# Patient Record
Sex: MALE | Race: WHITE | Employment: OTHER | URBAN - METROPOLITAN AREA
[De-identification: names, ages, dates, MRNs, and addresses within clinical notes are randomized per-mention and may not be internally consistent; named-entity substitution may affect disease eponyms.]

---

## 2017-04-17 PROBLEM — I21.9 ACUTE MI (HCC): Status: ACTIVE | Noted: 2017-04-17

## 2017-05-12 PROBLEM — I25.2 H/O ACUTE MYOCARDIAL INFARCTION: Status: ACTIVE | Noted: 2017-05-12

## 2017-05-12 PROBLEM — K92.2 GIB (GASTROINTESTINAL BLEEDING): Status: ACTIVE | Noted: 2017-05-12

## 2017-05-14 PROBLEM — D62 ACUTE BLOOD LOSS ANEMIA: Status: ACTIVE | Noted: 2017-05-14

## 2017-05-14 PROBLEM — K92.1 GASTROINTESTINAL HEMORRHAGE WITH MELENA: Status: ACTIVE | Noted: 2017-05-12

## 2017-06-07 PROBLEM — R55 NEAR SYNCOPE: Status: ACTIVE | Noted: 2017-06-07

## 2018-03-27 ENCOUNTER — HOSPITAL ENCOUNTER (OUTPATIENT)
Age: 83
Setting detail: THERAPIES SERIES
Discharge: HOME | End: 2018-03-27

## 2018-04-09 ENCOUNTER — HOSPITAL ENCOUNTER (OUTPATIENT)
Age: 83
Setting detail: THERAPIES SERIES
Discharge: HOME | End: 2018-04-09

## 2018-04-09 PROCEDURE — 9900000038 HC CARDIAC REHAB PHASE 3 - MONTHLY

## 2018-05-07 ENCOUNTER — HOSPITAL ENCOUNTER (OUTPATIENT)
Age: 83
Discharge: HOME | End: 2018-05-07
Payer: MEDICARE

## 2018-05-07 LAB
ALBUMIN SERPL-MCNC: 4.1 G/DL (ref 3.5–5.2)
ALP BLD-CCNC: 112 U/L (ref 40–129)
ALT SERPL-CCNC: 11 U/L (ref 0–40)
ANION GAP SERPL CALCULATED.3IONS-SCNC: 17 MMOL/L (ref 7–16)
AST SERPL-CCNC: 14 U/L (ref 0–39)
BASOPHILS ABSOLUTE: 0.05 E9/L (ref 0–0.2)
BASOPHILS RELATIVE PERCENT: 0.4 % (ref 0–2)
BILIRUB SERPL-MCNC: <0.2 MG/DL (ref 0–1.2)
BUN BLDV-MCNC: 46 MG/DL (ref 8–23)
CALCIUM SERPL-MCNC: 9.1 MG/DL (ref 8.6–10.2)
CHLORIDE BLD-SCNC: 105 MMOL/L (ref 98–107)
CHOLESTEROL, TOTAL: 120 MG/DL (ref 0–199)
CO2: 21 MMOL/L (ref 22–29)
CREAT SERPL-MCNC: 2.7 MG/DL (ref 0.7–1.2)
EOSINOPHILS ABSOLUTE: 0.22 E9/L (ref 0.05–0.5)
EOSINOPHILS RELATIVE PERCENT: 1.8 % (ref 0–6)
GFR AFRICAN AMERICAN: 27
GFR NON-AFRICAN AMERICAN: 22 ML/MIN/1.73
GLUCOSE BLD-MCNC: 122 MG/DL (ref 74–109)
HBA1C MFR BLD: 6.5 % (ref 4.8–5.9)
HCT VFR BLD CALC: 26.8 % (ref 37–54)
HDLC SERPL-MCNC: 40 MG/DL
HEMOGLOBIN: 7.8 G/DL (ref 12.5–16.5)
IMMATURE GRANULOCYTES #: 0.08 E9/L
IMMATURE GRANULOCYTES %: 0.7 % (ref 0–5)
LDL CHOLESTEROL CALCULATED: 45 MG/DL (ref 0–99)
LYMPHOCYTES ABSOLUTE: 2.41 E9/L (ref 1.5–4)
LYMPHOCYTES RELATIVE PERCENT: 20.3 % (ref 20–42)
MCH RBC QN AUTO: 21.8 PG (ref 26–35)
MCHC RBC AUTO-ENTMCNC: 29.1 % (ref 32–34.5)
MCV RBC AUTO: 75.1 FL (ref 80–99.9)
MONOCYTES ABSOLUTE: 0.94 E9/L (ref 0.1–0.95)
MONOCYTES RELATIVE PERCENT: 7.9 % (ref 2–12)
NEUTROPHILS ABSOLUTE: 8.2 E9/L (ref 1.8–7.3)
NEUTROPHILS RELATIVE PERCENT: 68.9 % (ref 43–80)
PDW BLD-RTO: 16.4 FL (ref 11.5–15)
PLATELET # BLD: 398 E9/L (ref 130–450)
PMV BLD AUTO: 9.6 FL (ref 7–12)
POTASSIUM SERPL-SCNC: 5.4 MMOL/L (ref 3.5–5)
RBC # BLD: 3.57 E12/L (ref 3.8–5.8)
SODIUM BLD-SCNC: 143 MMOL/L (ref 132–146)
T4 FREE: 0.84 NG/DL (ref 0.93–1.7)
TOTAL PROTEIN: 6.9 G/DL (ref 6.4–8.3)
TRIGL SERPL-MCNC: 177 MG/DL (ref 0–149)
TSH SERPL DL<=0.05 MIU/L-ACNC: 2.91 UIU/ML (ref 0.27–4.2)
VLDLC SERPL CALC-MCNC: 35 MG/DL
WBC # BLD: 11.9 E9/L (ref 4.5–11.5)

## 2018-05-07 PROCEDURE — 84439 ASSAY OF FREE THYROXINE: CPT

## 2018-05-07 PROCEDURE — 83036 HEMOGLOBIN GLYCOSYLATED A1C: CPT

## 2018-05-07 PROCEDURE — 85025 COMPLETE CBC W/AUTO DIFF WBC: CPT

## 2018-05-07 PROCEDURE — 36415 COLL VENOUS BLD VENIPUNCTURE: CPT

## 2018-05-07 PROCEDURE — 80061 LIPID PANEL: CPT

## 2018-05-07 PROCEDURE — 84443 ASSAY THYROID STIM HORMONE: CPT

## 2018-05-07 PROCEDURE — 80053 COMPREHEN METABOLIC PANEL: CPT

## 2018-05-14 ENCOUNTER — HOSPITAL ENCOUNTER (OUTPATIENT)
Age: 83
Setting detail: THERAPIES SERIES
Discharge: HOME | End: 2018-05-14

## 2018-05-14 PROCEDURE — 9900000038 HC CARDIAC REHAB PHASE 3 - MONTHLY

## 2018-07-12 ENCOUNTER — HOSPITAL ENCOUNTER (OUTPATIENT)
Age: 83
Discharge: HOME | End: 2018-07-12
Payer: MEDICARE

## 2018-07-12 LAB
ALBUMIN SERPL-MCNC: 4 G/DL (ref 3.5–5.2)
ALP BLD-CCNC: 118 U/L (ref 40–129)
ALT SERPL-CCNC: 11 U/L (ref 0–40)
ANION GAP SERPL CALCULATED.3IONS-SCNC: 11 MMOL/L (ref 7–16)
AST SERPL-CCNC: 15 U/L (ref 0–39)
BASOPHILS ABSOLUTE: 0.05 E9/L (ref 0–0.2)
BASOPHILS RELATIVE PERCENT: 0.4 % (ref 0–2)
BILIRUB SERPL-MCNC: <0.2 MG/DL (ref 0–1.2)
BUN BLDV-MCNC: 50 MG/DL (ref 8–23)
C3 COMPLEMENT: 127 MG/DL (ref 90–180)
C4 COMPLEMENT: 34 MG/DL (ref 10–40)
CALCIUM SERPL-MCNC: 9.6 MG/DL (ref 8.6–10.2)
CHLORIDE BLD-SCNC: 104 MMOL/L (ref 98–107)
CO2: 25 MMOL/L (ref 22–29)
CREAT SERPL-MCNC: 2.7 MG/DL (ref 0.7–1.2)
EOSINOPHILS ABSOLUTE: 0.25 E9/L (ref 0.05–0.5)
EOSINOPHILS RELATIVE PERCENT: 1.9 % (ref 0–6)
FERRITIN: 10 NG/ML
FOLATE: >20 NG/ML (ref 4.8–24.2)
GFR AFRICAN AMERICAN: 27
GFR NON-AFRICAN AMERICAN: 22 ML/MIN/1.73
GLUCOSE BLD-MCNC: 132 MG/DL (ref 74–109)
HAPTOGLOBIN: 305 MG/DL (ref 30–200)
HCT VFR BLD CALC: 27.4 % (ref 37–54)
HEMOGLOBIN: 8 G/DL (ref 12.5–16.5)
IMMATURE GRANULOCYTES #: 0.13 E9/L
IMMATURE GRANULOCYTES %: 1 % (ref 0–5)
IRON SATURATION: 6 % (ref 20–55)
IRON: 20 MCG/DL (ref 59–158)
LACTATE DEHYDROGENASE: 183 U/L (ref 135–225)
LYMPHOCYTES ABSOLUTE: 2.33 E9/L (ref 1.5–4)
LYMPHOCYTES RELATIVE PERCENT: 18 % (ref 20–42)
MCH RBC QN AUTO: 22.5 PG (ref 26–35)
MCHC RBC AUTO-ENTMCNC: 29.2 % (ref 32–34.5)
MCV RBC AUTO: 77.2 FL (ref 80–99.9)
MONOCYTES ABSOLUTE: 1.1 E9/L (ref 0.1–0.95)
MONOCYTES RELATIVE PERCENT: 8.5 % (ref 2–12)
NEUTROPHILS ABSOLUTE: 9.05 E9/L (ref 1.8–7.3)
NEUTROPHILS RELATIVE PERCENT: 70.2 % (ref 43–80)
PDW BLD-RTO: 19.9 FL (ref 11.5–15)
PLATELET # BLD: 421 E9/L (ref 130–450)
PMV BLD AUTO: 9.5 FL (ref 7–12)
POTASSIUM SERPL-SCNC: 5.5 MMOL/L (ref 3.5–5)
RBC # BLD: 3.55 E12/L (ref 3.8–5.8)
RHEUMATOID FACTOR: <10 IU/ML (ref 0–13)
SODIUM BLD-SCNC: 140 MMOL/L (ref 132–146)
TOTAL IRON BINDING CAPACITY: 336 MCG/DL (ref 250–450)
TOTAL PROTEIN: 7.2 G/DL (ref 6.4–8.3)
VITAMIN B-12: 937 PG/ML (ref 211–946)
VITAMIN D 25-HYDROXY: 79 NG/ML (ref 30–100)
WBC # BLD: 12.9 E9/L (ref 4.5–11.5)

## 2018-07-12 PROCEDURE — 82607 VITAMIN B-12: CPT

## 2018-07-12 PROCEDURE — 86431 RHEUMATOID FACTOR QUANT: CPT

## 2018-07-12 PROCEDURE — 84165 PROTEIN E-PHORESIS SERUM: CPT

## 2018-07-12 PROCEDURE — 83010 ASSAY OF HAPTOGLOBIN QUANT: CPT

## 2018-07-12 PROCEDURE — 83540 ASSAY OF IRON: CPT

## 2018-07-12 PROCEDURE — 80053 COMPREHEN METABOLIC PANEL: CPT

## 2018-07-12 PROCEDURE — 36415 COLL VENOUS BLD VENIPUNCTURE: CPT

## 2018-07-12 PROCEDURE — 82728 ASSAY OF FERRITIN: CPT

## 2018-07-12 PROCEDURE — 85025 COMPLETE CBC W/AUTO DIFF WBC: CPT

## 2018-07-12 PROCEDURE — 86038 ANTINUCLEAR ANTIBODIES: CPT

## 2018-07-12 PROCEDURE — 82306 VITAMIN D 25 HYDROXY: CPT

## 2018-07-12 PROCEDURE — 83615 LACTATE (LD) (LDH) ENZYME: CPT

## 2018-07-12 PROCEDURE — 83550 IRON BINDING TEST: CPT

## 2018-07-12 PROCEDURE — 86160 COMPLEMENT ANTIGEN: CPT

## 2018-07-12 PROCEDURE — 82746 ASSAY OF FOLIC ACID SERUM: CPT

## 2018-07-13 ENCOUNTER — HOSPITAL ENCOUNTER (EMERGENCY)
Age: 83
Discharge: HOME | End: 2018-07-13
Payer: MEDICARE

## 2018-07-13 ENCOUNTER — APPOINTMENT (OUTPATIENT)
Dept: GENERAL RADIOLOGY | Age: 83
End: 2018-07-13
Payer: MEDICARE

## 2018-07-13 VITALS
DIASTOLIC BLOOD PRESSURE: 55 MMHG | TEMPERATURE: 98.4 F | RESPIRATION RATE: 16 BRPM | SYSTOLIC BLOOD PRESSURE: 101 MMHG | BODY MASS INDEX: 28.89 KG/M2 | HEART RATE: 74 BPM | OXYGEN SATURATION: 95 % | WEIGHT: 190 LBS

## 2018-07-13 DIAGNOSIS — K62.89 RECTAL PAIN: Primary | ICD-10-CM

## 2018-07-13 LAB
ALBUMIN SERPL-MCNC: 3.1 G/DL (ref 3.5–4.7)
ALPHA-1-GLOBULIN: 0.2 G/DL (ref 0.2–0.4)
ALPHA-2-GLOBULIN: 1 G/FL (ref 0.5–1)
ANTI-NUCLEAR ANTIBODY (ANA): NEGATIVE
BETA GLOBULIN: 1.2 G/DL (ref 0.8–1.3)
ELECTROPHORESIS: ABNORMAL
GAMMA GLOBULIN: 1.1 G/DL (ref 0.7–1.6)

## 2018-07-13 PROCEDURE — 74019 RADEX ABDOMEN 2 VIEWS: CPT

## 2018-07-13 PROCEDURE — 99212 OFFICE O/P EST SF 10 MIN: CPT

## 2018-07-13 RX ORDER — MENTHOL AND ZINC OXIDE .44; 20.625 G/100G; G/100G
OINTMENT TOPICAL 2 TIMES DAILY PRN
Qty: 1 TUBE | Refills: 4 | Status: SHIPPED | OUTPATIENT
Start: 2018-07-13 | End: 2018-07-20

## 2018-07-13 ASSESSMENT — PAIN DESCRIPTION - LOCATION: LOCATION: ABDOMEN

## 2018-07-13 ASSESSMENT — PAIN DESCRIPTION - DESCRIPTORS: DESCRIPTORS: DISCOMFORT

## 2018-07-13 ASSESSMENT — PAIN SCALES - GENERAL: PAINLEVEL_OUTOF10: 3

## 2018-07-16 ENCOUNTER — HOSPITAL ENCOUNTER (OUTPATIENT)
Age: 83
Setting detail: SPECIMEN
Discharge: HOME | End: 2018-07-16
Payer: MEDICARE

## 2018-07-16 LAB
BILIRUBIN URINE: NEGATIVE
BLOOD, URINE: NEGATIVE
CLARITY: CLEAR
COLOR: YELLOW
GLUCOSE URINE: NEGATIVE MG/DL
KETONES, URINE: NEGATIVE MG/DL
LEUKOCYTE ESTERASE, URINE: NEGATIVE
NITRITE, URINE: NEGATIVE
PH UA: 6 (ref 5–9)
PROTEIN UA: NEGATIVE MG/DL
SPECIFIC GRAVITY UA: 1.02 (ref 1–1.03)
UROBILINOGEN, URINE: 0.2 E.U./DL

## 2018-07-16 PROCEDURE — 82570 ASSAY OF URINE CREATININE: CPT

## 2018-07-16 PROCEDURE — 82044 UR ALBUMIN SEMIQUANTITATIVE: CPT

## 2018-07-16 PROCEDURE — 81003 URINALYSIS AUTO W/O SCOPE: CPT

## 2018-07-16 PROCEDURE — 84166 PROTEIN E-PHORESIS/URINE/CSF: CPT

## 2018-07-20 LAB — ADDENDUM ELECTROPHORESIS URINE RANDOM: NORMAL

## 2018-07-22 LAB
CREATININE URINE: 104 MG/DL (ref 40–278)
MICROALBUMIN UR-MCNC: <12 MG/L
MICROALBUMIN/CREAT UR-RTO: ABNORMAL (ref 0–30)

## 2018-08-10 ENCOUNTER — HOSPITAL ENCOUNTER (EMERGENCY)
Age: 83
Discharge: HOME | End: 2018-08-10
Payer: MEDICARE

## 2018-08-10 VITALS
RESPIRATION RATE: 20 BRPM | SYSTOLIC BLOOD PRESSURE: 153 MMHG | DIASTOLIC BLOOD PRESSURE: 84 MMHG | BODY MASS INDEX: 29.65 KG/M2 | HEART RATE: 75 BPM | WEIGHT: 195 LBS | OXYGEN SATURATION: 94 % | TEMPERATURE: 98.2 F

## 2018-08-10 DIAGNOSIS — K08.89 TOOTHACHE: Primary | ICD-10-CM

## 2018-08-10 PROCEDURE — 99212 OFFICE O/P EST SF 10 MIN: CPT

## 2018-08-10 RX ORDER — ACETAMINOPHEN AND CODEINE PHOSPHATE 300; 30 MG/1; MG/1
1 TABLET ORAL EVERY 6 HOURS PRN
Qty: 12 TABLET | Refills: 0 | Status: SHIPPED | OUTPATIENT
Start: 2018-08-10 | End: 2018-08-22

## 2018-08-10 RX ORDER — AMOXICILLIN 500 MG/1
500 CAPSULE ORAL 3 TIMES DAILY
Qty: 30 CAPSULE | Refills: 0 | Status: SHIPPED | OUTPATIENT
Start: 2018-08-10 | End: 2018-08-20

## 2018-08-10 ASSESSMENT — PAIN DESCRIPTION - PROGRESSION: CLINICAL_PROGRESSION: GRADUALLY WORSENING

## 2018-08-10 ASSESSMENT — PAIN DESCRIPTION - DESCRIPTORS: DESCRIPTORS: ACHING;DISCOMFORT;THROBBING

## 2018-08-10 ASSESSMENT — PAIN SCALES - GENERAL: PAINLEVEL_OUTOF10: 8

## 2018-08-10 ASSESSMENT — PAIN DESCRIPTION - FREQUENCY: FREQUENCY: CONTINUOUS

## 2018-08-10 ASSESSMENT — PAIN DESCRIPTION - LOCATION: LOCATION: TEETH

## 2018-08-10 ASSESSMENT — PAIN DESCRIPTION - ORIENTATION: ORIENTATION: LEFT;LOWER

## 2018-08-10 ASSESSMENT — PAIN DESCRIPTION - PAIN TYPE: TYPE: ACUTE PAIN

## 2018-08-10 NOTE — ED PROVIDER NOTES
This is a 80year old male that presents to urgent care complaining of left lower dental pain for the past several days. Denies difficulty breathing or swallowing. No chest pain. No fever or chills. Review of Systems    Physical Exam   Constitutional: He is oriented to person, place, and time. He appears well-developed and well-nourished. HENT:   Head: Normocephalic and atraumatic. Right Ear: Hearing, tympanic membrane, external ear and ear canal normal.   Left Ear: Hearing, tympanic membrane, external ear and ear canal normal.   Nose: Nose normal. Right sinus exhibits no maxillary sinus tenderness and no frontal sinus tenderness. Left sinus exhibits no maxillary sinus tenderness and no frontal sinus tenderness. Mouth/Throat: Uvula is midline, oropharynx is clear and moist and mucous membranes are normal. No trismus in the jaw. Dental caries present. No dental abscesses or uvula swelling. Eyes: Conjunctivae, EOM and lids are normal. Pupils are equal, round, and reactive to light. Neck: Normal range of motion. Neck supple. Cardiovascular: Normal rate, regular rhythm and normal heart sounds. No murmur heard. Pulmonary/Chest: Effort normal and breath sounds normal.   Abdominal: Soft. Bowel sounds are normal. There is no tenderness. There is no rigidity, no rebound, no guarding and no CVA tenderness. Musculoskeletal: He exhibits no edema. Neurological: He is alert and oriented to person, place, and time. He has normal strength. No cranial nerve deficit or sensory deficit. Coordination and gait normal. GCS eye subscore is 4. GCS verbal subscore is 5. GCS motor subscore is 6. Skin: Skin is warm and dry. No abrasion and no rash noted. Nursing note and vitals reviewed.       Procedures    MDM         --------------------------------------------- PAST HISTORY ---------------------------------------------  Past Medical History:  has a past medical history of Acute blood loss anemia; Colitis; Diabetes ([facility] [address] GERD (gastroesophageal reflux disease); Hypertension; Overactive bladder; and Thyroid disease. Past Surgical History:  has a past surgical history that includes joint replacement (Bilateral); Colonoscopy; Appendectomy; skin biopsy; back surgery; Circumcision, non- (2016); Cholecystectomy; Liver surgery; Cardiac catheterization (2017); and Coronary angioplasty with stent (Right, 2017). Social History:  reports that he quit smoking about 68 years ago. He has never used smokeless tobacco. He reports that he does not drink alcohol or use drugs. Family History: family history is not on file. The patients home medications have been reviewed. Allergies: Patient has no known allergies. -------------------------------------------------- RESULTS -------------------------------------------------  No results found for this visit on 08/10/18. No orders to display       ------------------------- NURSING NOTES AND VITALS REVIEWED ---------------------------   The nursing notes within the ED encounter and vital signs as below have been reviewed. BP (!) 153/84   Pulse 75   Temp 98.2 °F (36.8 °C) (Oral)   Resp 20   Wt 195 lb (88.5 kg)   SpO2 94%   BMI 29.65 kg/m²   Oxygen Saturation Interpretation: Normal      ------------------------------------------ PROGRESS NOTES ------------------------------------------   I have spoken with the patient and discussed todays results, in addition to providing specific details for the plan of care and counseling regarding the diagnosis and prognosis. Their questions are answered at this time and they are agreeable with the plan.      --------------------------------- ADDITIONAL PROVIDER NOTES ---------------------------------     This patient is stable for discharge. I have shared the specific conditions for return, as well as the importance of follow-up.       * NOTE: This report was transcribed using voice

## 2018-10-08 ENCOUNTER — HOSPITAL ENCOUNTER (OUTPATIENT)
Age: 83
Discharge: HOME | End: 2018-10-08
Payer: MEDICARE

## 2018-10-08 LAB
INR BLD: 1
PROTHROMBIN TIME: 11.4 SEC (ref 9.3–12.4)

## 2018-10-08 PROCEDURE — 85610 PROTHROMBIN TIME: CPT

## 2018-10-08 PROCEDURE — 36415 COLL VENOUS BLD VENIPUNCTURE: CPT

## 2018-12-17 ENCOUNTER — OFFICE VISIT (OUTPATIENT)
Dept: FAMILY MEDICINE CLINIC | Age: 83
End: 2018-12-17
Payer: MEDICARE

## 2018-12-17 VITALS
SYSTOLIC BLOOD PRESSURE: 126 MMHG | OXYGEN SATURATION: 90 % | HEIGHT: 68 IN | HEART RATE: 88 BPM | WEIGHT: 196 LBS | BODY MASS INDEX: 29.7 KG/M2 | DIASTOLIC BLOOD PRESSURE: 64 MMHG | TEMPERATURE: 98.3 F | RESPIRATION RATE: 20 BRPM

## 2018-12-17 DIAGNOSIS — Z91.81 AT HIGH RISK FOR FALLS: ICD-10-CM

## 2018-12-17 DIAGNOSIS — E03.9 ACQUIRED HYPOTHYROIDISM: ICD-10-CM

## 2018-12-17 DIAGNOSIS — Z23 NEED FOR INFLUENZA VACCINATION: ICD-10-CM

## 2018-12-17 DIAGNOSIS — Z76.89 ENCOUNTER TO ESTABLISH CARE: Primary | ICD-10-CM

## 2018-12-17 DIAGNOSIS — Z13.31 POSITIVE DEPRESSION SCREENING: ICD-10-CM

## 2018-12-17 DIAGNOSIS — E11.40 TYPE 2 DIABETES MELLITUS WITH DIABETIC NEUROPATHY, WITHOUT LONG-TERM CURRENT USE OF INSULIN (HCC): ICD-10-CM

## 2018-12-17 DIAGNOSIS — I10 ESSENTIAL HYPERTENSION: ICD-10-CM

## 2018-12-17 PROCEDURE — 99214 OFFICE O/P EST MOD 30 MIN: CPT | Performed by: FAMILY MEDICINE

## 2018-12-17 PROCEDURE — G8431 POS CLIN DEPRES SCRN F/U DOC: HCPCS | Performed by: FAMILY MEDICINE

## 2018-12-17 PROCEDURE — G0444 DEPRESSION SCREEN ANNUAL: HCPCS | Performed by: FAMILY MEDICINE

## 2018-12-17 PROCEDURE — G8419 CALC BMI OUT NRM PARAM NOF/U: HCPCS | Performed by: FAMILY MEDICINE

## 2018-12-17 PROCEDURE — 1123F ACP DISCUSS/DSCN MKR DOCD: CPT | Performed by: FAMILY MEDICINE

## 2018-12-17 PROCEDURE — 90662 IIV NO PRSV INCREASED AG IM: CPT | Performed by: FAMILY MEDICINE

## 2018-12-17 PROCEDURE — 1036F TOBACCO NON-USER: CPT | Performed by: FAMILY MEDICINE

## 2018-12-17 PROCEDURE — G8427 DOCREV CUR MEDS BY ELIG CLIN: HCPCS | Performed by: FAMILY MEDICINE

## 2018-12-17 PROCEDURE — 1101F PT FALLS ASSESS-DOCD LE1/YR: CPT | Performed by: FAMILY MEDICINE

## 2018-12-17 PROCEDURE — 4040F PNEUMOC VAC/ADMIN/RCVD: CPT | Performed by: FAMILY MEDICINE

## 2018-12-17 PROCEDURE — G8482 FLU IMMUNIZE ORDER/ADMIN: HCPCS | Performed by: FAMILY MEDICINE

## 2018-12-17 PROCEDURE — G0008 ADMIN INFLUENZA VIRUS VAC: HCPCS | Performed by: FAMILY MEDICINE

## 2018-12-17 RX ORDER — LEVOTHYROXINE SODIUM 175 UG/1
175 TABLET ORAL
Qty: 90 TABLET | Refills: 1 | Status: SHIPPED | OUTPATIENT
Start: 2018-12-17 | End: 2019-06-21 | Stop reason: SDUPTHER

## 2018-12-17 RX ORDER — METOPROLOL SUCCINATE 100 MG/1
100 TABLET, EXTENDED RELEASE ORAL DAILY
COMMUNITY
End: 2018-12-17 | Stop reason: SDUPTHER

## 2018-12-17 RX ORDER — CLOPIDOGREL BISULFATE 75 MG/1
75 TABLET ORAL
Qty: 90 TABLET | Refills: 1 | Status: SHIPPED | OUTPATIENT
Start: 2018-12-17 | End: 2019-06-21 | Stop reason: SDUPTHER

## 2018-12-17 RX ORDER — GABAPENTIN 100 MG/1
100 CAPSULE ORAL 3 TIMES DAILY
Qty: 90 CAPSULE | Refills: 0 | Status: SHIPPED | OUTPATIENT
Start: 2018-12-17 | End: 2019-02-26 | Stop reason: SDUPTHER

## 2018-12-17 RX ORDER — METOPROLOL SUCCINATE 100 MG/1
100 TABLET, EXTENDED RELEASE ORAL DAILY
Qty: 90 TABLET | Refills: 1 | Status: SHIPPED | OUTPATIENT
Start: 2018-12-17 | End: 2019-07-03 | Stop reason: SDUPTHER

## 2018-12-17 RX ORDER — NITROGLYCERIN 0.4 MG/1
TABLET SUBLINGUAL
COMMUNITY
End: 2019-12-11

## 2018-12-17 RX ORDER — PANTOPRAZOLE SODIUM 40 MG/1
40 TABLET, DELAYED RELEASE ORAL
Qty: 90 TABLET | Refills: 1 | Status: SHIPPED | OUTPATIENT
Start: 2018-12-17 | End: 2019-12-11

## 2018-12-17 RX ORDER — EZETIMIBE AND SIMVASTATIN 10; 40 MG/1; MG/1
1 TABLET ORAL NIGHTLY
Qty: 90 TABLET | Refills: 1 | Status: SHIPPED | OUTPATIENT
Start: 2018-12-17 | End: 2019-12-11

## 2018-12-17 RX ORDER — EZETIMIBE AND SIMVASTATIN 10; 40 MG/1; MG/1
1 TABLET ORAL
COMMUNITY
End: 2018-12-17 | Stop reason: SDUPTHER

## 2018-12-17 RX ORDER — METFORMIN HYDROCHLORIDE 500 MG/1
500 TABLET, EXTENDED RELEASE ORAL
Qty: 90 TABLET | Refills: 1 | Status: SHIPPED | OUTPATIENT
Start: 2018-12-17 | End: 2018-12-30

## 2018-12-17 RX ORDER — METOPROLOL TARTRATE AND HYDROCHLOROTHIAZIDE 50; 25 MG/1; MG/1
TABLET ORAL
COMMUNITY
End: 2018-12-17 | Stop reason: SDUPTHER

## 2018-12-17 ASSESSMENT — PATIENT HEALTH QUESTIONNAIRE - PHQ9
SUM OF ALL RESPONSES TO PHQ QUESTIONS 1-9: 10
5. POOR APPETITE OR OVEREATING: 0
6. FEELING BAD ABOUT YOURSELF - OR THAT YOU ARE A FAILURE OR HAVE LET YOURSELF OR YOUR FAMILY DOWN: 0
10. IF YOU CHECKED OFF ANY PROBLEMS, HOW DIFFICULT HAVE THESE PROBLEMS MADE IT FOR YOU TO DO YOUR WORK, TAKE CARE OF THINGS AT HOME, OR GET ALONG WITH OTHER PEOPLE: 1
SUM OF ALL RESPONSES TO PHQ9 QUESTIONS 1 & 2: 5
3. TROUBLE FALLING OR STAYING ASLEEP: 2
SUM OF ALL RESPONSES TO PHQ QUESTIONS 1-9: 10
2. FEELING DOWN, DEPRESSED OR HOPELESS: 2
4. FEELING TIRED OR HAVING LITTLE ENERGY: 3
8. MOVING OR SPEAKING SO SLOWLY THAT OTHER PEOPLE COULD HAVE NOTICED. OR THE OPPOSITE, BEING SO FIGETY OR RESTLESS THAT YOU HAVE BEEN MOVING AROUND A LOT MORE THAN USUAL: 0
1. LITTLE INTEREST OR PLEASURE IN DOING THINGS: 3
7. TROUBLE CONCENTRATING ON THINGS, SUCH AS READING THE NEWSPAPER OR WATCHING TELEVISION: 0
9. THOUGHTS THAT YOU WOULD BE BETTER OFF DEAD, OR OF HURTING YOURSELF: 0

## 2018-12-17 NOTE — PROGRESS NOTES
OFFICE PROGRESS NOTE      SUBJECTIVE:        Patient ID:   Consuelo Fagan is a 80 y.o. male who presents for   Chief Complaint   Patient presents with    Establish Care    Hypertension    Diabetes     neuropathy     Depression     depression screening complete - score 10     Other     fall screen positive    Elbow Injury     fall hit elbow           HPI:     FEELS GOOD. WATCHING DIET GOOD. WALKING SOME  FOR EXERCISE. HAS PROBLEM BALANCING AT TIMES. HAS PROBLEM WITH FEELING AND NUMBNESS IN THE FEET. H/O DIABETES FOR ABOUT 50 YEARS. TAKING MEDICATIONS REGULARLY. HAD SURGERY DONE BY DR. Mercy Abide ON THE LEFT SHOULDER. STILL HAS SOME PAIN BUT GETTING BETTER. [facility]ING [facility]SFUSION FOR LOW BLOOD COUNT. BLOOD COUNT IS GRADUALLY IMPROVING. Prior to Admission medications    Medication Sig Start Date End Date Taking? Authorizing Provider   nitroGLYCERIN (NITROSTAT) 0.4 MG SL tablet nitroglycerin 0.4 mg sublingual tablet   Place 1 tablet by sublingual route. Yes Historical Provider, MD   metoprolol succinate (TOPROL XL) 100 MG extended release tablet Take 1 tablet by mouth daily 12/17/18  Yes Mundo Estrada MD   ezetimibe-simvastatin (VYTORIN) 10-40 MG per tablet Take 1 tablet by mouth nightly 12/17/18  Yes Mundo Estrada MD   metFORMIN (GLUCOPHAGE-XR) 500 MG extended release tablet Take 1 tablet by mouth every morning (before breakfast) 12/17/18  Yes Mundo Estrada MD   levothyroxine (SYNTHROID) 175 MCG tablet Take 1 tablet by mouth every morning (before breakfast) 12/17/18  Yes Mundo Estrada MD   clopidogrel (PLAVIX) 75 MG tablet Take 1 tablet by mouth every morning (before breakfast) 12/17/18  Yes Mundo Estrada MD   pantoprazole (PROTONIX) 40 MG tablet Take 1 tablet by mouth every morning (before breakfast) 12/17/18  Yes Mundo Estrada MD   gabapentin (NEURONTIN) 100 MG capsule Take 1 capsule by mouth 3 times daily for 30 days.  Intended supply: 30 days. 12/17/18 1/16/19 Yes Richardson Valladares MD   acetaminophen (TYLENOL) 325 MG tablet Take 650 mg by mouth every morning (before breakfast)   Yes Historical Provider, MD   atorvastatin (LIPITOR) 40 MG tablet Take 1 tablet by mouth nightly 4/29/17  Yes Yong Orlando MD     Social History     Social History    Marital status:      Spouse name: N/A    Number of children: N/A    Years of education: N/A     Social History Main Topics    Smoking status: Former Smoker     Quit date: 1950    Smokeless tobacco: Never Used    Alcohol use No    Drug use: No    Sexual activity: No     Other Topics Concern    None     Social History Narrative    None       I have reviewed Cj's allergies, medications, problem list, medical, social and family history and have updated as needed in the electronic medical record  Review Of Systems:    Skin: no abnormal pigmentation, rash, scaling, itching, masses, hair or nail changes  Eyes: no blurring, diplopia, or eye pain  Ears/Nose/Throat: no hearing loss, tinnitus, vertigo, nosebleed, nasal congestion, rhinorrhea, sore throat  Respiratory: no cough, pleuritic chest pain, dyspnea, or wheezing  Cardiovascular: no chest pain, angina, dyspnea on exertion, orthopnea, PND, palpitations, or claudication  Gastrointestinal: no nausea, vomiting, heartburn, diarrhea, constipation, bloating,  abdominal pain, or blood per rectum. Appetite is good  Genitourinary: no urinary urgency, frequency, dysuria, nocturia, hesitancy, or incontinence  Musculoskeletal: joint pains off and on. Morning stiffness.  Ambulating well  Neurologic: no paralysis, paresis, paresthesia, seizures, tremors, or headaches  Hematologic/Lymphatic/Immunologic: no anemia, abnormal bleeding/bruising, fever, chills, night sweats, swollen glands, or unexplained weight loss  Endocrine: no heat or cold intolerance and no polyphagia, polydipsia, or polyuria        OBJECTIVE:     VS:  Wt Readings from Last 3

## 2018-12-30 ENCOUNTER — HOSPITAL ENCOUNTER (EMERGENCY)
Age: 83
Discharge: HOME | End: 2018-12-30
Payer: MEDICARE

## 2018-12-30 ENCOUNTER — APPOINTMENT (OUTPATIENT)
Dept: GENERAL RADIOLOGY | Age: 83
End: 2018-12-30
Payer: MEDICARE

## 2018-12-30 VITALS
RESPIRATION RATE: 20 BRPM | OXYGEN SATURATION: 91 % | SYSTOLIC BLOOD PRESSURE: 145 MMHG | BODY MASS INDEX: 30.31 KG/M2 | WEIGHT: 200 LBS | HEIGHT: 68 IN | HEART RATE: 74 BPM | TEMPERATURE: 97.8 F | DIASTOLIC BLOOD PRESSURE: 65 MMHG

## 2018-12-30 DIAGNOSIS — S20.211A CONTUSION OF RIB ON RIGHT SIDE, INITIAL ENCOUNTER: Primary | ICD-10-CM

## 2018-12-30 PROCEDURE — 99284 EMERGENCY DEPT VISIT MOD MDM: CPT

## 2018-12-30 PROCEDURE — 6370000000 HC RX 637 (ALT 250 FOR IP): Performed by: PHYSICIAN ASSISTANT

## 2018-12-30 PROCEDURE — 71101 X-RAY EXAM UNILAT RIBS/CHEST: CPT

## 2018-12-30 RX ORDER — HYDROCODONE BITARTRATE AND ACETAMINOPHEN 5; 325 MG/1; MG/1
1 TABLET ORAL ONCE
Status: COMPLETED | OUTPATIENT
Start: 2018-12-30 | End: 2018-12-30

## 2018-12-30 RX ORDER — HYDROCODONE BITARTRATE AND ACETAMINOPHEN 5; 325 MG/1; MG/1
1 TABLET ORAL EVERY 6 HOURS PRN
Qty: 8 TABLET | Refills: 0 | Status: SHIPPED | OUTPATIENT
Start: 2018-12-30 | End: 2019-01-01

## 2018-12-30 RX ADMIN — HYDROCODONE BITARTRATE AND ACETAMINOPHEN 1 TABLET: 5; 325 TABLET ORAL at 11:34

## 2018-12-30 ASSESSMENT — PAIN SCALES - GENERAL: PAINLEVEL_OUTOF10: 8

## 2019-02-26 ENCOUNTER — OFFICE VISIT (OUTPATIENT)
Dept: FAMILY MEDICINE CLINIC | Age: 84
End: 2019-02-26
Payer: MEDICARE

## 2019-02-26 VITALS
HEIGHT: 67 IN | TEMPERATURE: 97.8 F | OXYGEN SATURATION: 92 % | BODY MASS INDEX: 28.6 KG/M2 | HEART RATE: 82 BPM | WEIGHT: 182.19 LBS | SYSTOLIC BLOOD PRESSURE: 128 MMHG | RESPIRATION RATE: 18 BRPM | DIASTOLIC BLOOD PRESSURE: 62 MMHG

## 2019-02-26 DIAGNOSIS — I10 ESSENTIAL HYPERTENSION: ICD-10-CM

## 2019-02-26 DIAGNOSIS — E46 PROTEIN MALNUTRITION (HCC): ICD-10-CM

## 2019-02-26 DIAGNOSIS — E08.00 DIABETES MELLITUS DUE TO UNDERLYING CONDITION WITH HYPEROSMOLARITY WITHOUT COMA, WITHOUT LONG-TERM CURRENT USE OF INSULIN (HCC): ICD-10-CM

## 2019-02-26 DIAGNOSIS — N18.4 CKD (CHRONIC KIDNEY DISEASE) STAGE 4, GFR 15-29 ML/MIN (HCC): ICD-10-CM

## 2019-02-26 DIAGNOSIS — Z23 NEED FOR VACCINATION WITH 13-POLYVALENT PNEUMOCOCCAL CONJUGATE VACCINE: ICD-10-CM

## 2019-02-26 DIAGNOSIS — N18.30 CHRONIC KIDNEY DISEASE, STAGE III (MODERATE) (HCC): ICD-10-CM

## 2019-02-26 DIAGNOSIS — Z13.31 DEPRESSION SCREENING: ICD-10-CM

## 2019-02-26 DIAGNOSIS — R06.02 SOB (SHORTNESS OF BREATH): ICD-10-CM

## 2019-02-26 DIAGNOSIS — E11.00 TYPE 2 DIABETES MELLITUS WITH HYPEROSMOLARITY WITHOUT COMA, WITHOUT LONG-TERM CURRENT USE OF INSULIN (HCC): Primary | ICD-10-CM

## 2019-02-26 LAB — HBA1C MFR BLD: 5.9 %

## 2019-02-26 PROCEDURE — 4040F PNEUMOC VAC/ADMIN/RCVD: CPT | Performed by: FAMILY MEDICINE

## 2019-02-26 PROCEDURE — 1101F PT FALLS ASSESS-DOCD LE1/YR: CPT | Performed by: FAMILY MEDICINE

## 2019-02-26 PROCEDURE — 90670 PCV13 VACCINE IM: CPT | Performed by: FAMILY MEDICINE

## 2019-02-26 PROCEDURE — 83036 HEMOGLOBIN GLYCOSYLATED A1C: CPT | Performed by: FAMILY MEDICINE

## 2019-02-26 PROCEDURE — 1123F ACP DISCUSS/DSCN MKR DOCD: CPT | Performed by: FAMILY MEDICINE

## 2019-02-26 PROCEDURE — G0009 ADMIN PNEUMOCOCCAL VACCINE: HCPCS | Performed by: FAMILY MEDICINE

## 2019-02-26 PROCEDURE — G8419 CALC BMI OUT NRM PARAM NOF/U: HCPCS | Performed by: FAMILY MEDICINE

## 2019-02-26 PROCEDURE — 99214 OFFICE O/P EST MOD 30 MIN: CPT | Performed by: FAMILY MEDICINE

## 2019-02-26 PROCEDURE — G8482 FLU IMMUNIZE ORDER/ADMIN: HCPCS | Performed by: FAMILY MEDICINE

## 2019-02-26 PROCEDURE — G8427 DOCREV CUR MEDS BY ELIG CLIN: HCPCS | Performed by: FAMILY MEDICINE

## 2019-02-26 PROCEDURE — 94010 BREATHING CAPACITY TEST: CPT | Performed by: FAMILY MEDICINE

## 2019-02-26 PROCEDURE — 1036F TOBACCO NON-USER: CPT | Performed by: FAMILY MEDICINE

## 2019-02-26 RX ORDER — GABAPENTIN 100 MG/1
200 CAPSULE ORAL 2 TIMES DAILY
Qty: 120 CAPSULE | Refills: 2 | Status: SHIPPED | OUTPATIENT
Start: 2019-02-26 | End: 2020-01-07 | Stop reason: SDUPTHER

## 2019-02-26 RX ORDER — METFORMIN HYDROCHLORIDE 500 MG/1
500 TABLET, EXTENDED RELEASE ORAL
Qty: 90 TABLET | Refills: 1 | Status: SHIPPED | OUTPATIENT
Start: 2019-02-26 | End: 2019-12-31

## 2019-02-26 RX ORDER — ACETAMINOPHEN 160 MG
TABLET,DISINTEGRATING ORAL
COMMUNITY
End: 2019-12-11

## 2019-02-26 ASSESSMENT — PATIENT HEALTH QUESTIONNAIRE - PHQ9
2. FEELING DOWN, DEPRESSED OR HOPELESS: 0
1. LITTLE INTEREST OR PLEASURE IN DOING THINGS: 0
SUM OF ALL RESPONSES TO PHQ QUESTIONS 1-9: 0
SUM OF ALL RESPONSES TO PHQ9 QUESTIONS 1 & 2: 0
SUM OF ALL RESPONSES TO PHQ QUESTIONS 1-9: 0

## 2019-06-24 RX ORDER — LEVOTHYROXINE SODIUM 175 UG/1
175 TABLET ORAL DAILY
Qty: 30 TABLET | Refills: 0 | Status: SHIPPED | OUTPATIENT
Start: 2019-06-24 | End: 2019-07-03 | Stop reason: SDUPTHER

## 2019-06-24 RX ORDER — CLOPIDOGREL BISULFATE 75 MG/1
75 TABLET ORAL DAILY
Qty: 30 TABLET | Refills: 0 | Status: SHIPPED | OUTPATIENT
Start: 2019-06-24 | End: 2019-07-03 | Stop reason: SDUPTHER

## 2019-06-24 NOTE — TELEPHONE ENCOUNTER
Received call requesting refill   Last OV 2/6/19  Next OV  Not Scheduled    Patient does need a follow up.  30 days pended, if approved ill set up follow up with patient.

## 2019-07-03 RX ORDER — ATORVASTATIN CALCIUM 40 MG/1
40 TABLET, FILM COATED ORAL NIGHTLY
Qty: 90 TABLET | Refills: 3 | Status: SHIPPED
Start: 2019-07-03 | End: 2020-06-18 | Stop reason: SDUPTHER

## 2019-07-03 RX ORDER — LEVOTHYROXINE SODIUM 175 UG/1
175 TABLET ORAL DAILY
Qty: 90 TABLET | Refills: 3 | Status: SHIPPED
Start: 2019-07-03 | End: 2020-06-18 | Stop reason: SDUPTHER

## 2019-07-03 RX ORDER — METOPROLOL SUCCINATE 100 MG/1
100 TABLET, EXTENDED RELEASE ORAL DAILY
Qty: 90 TABLET | Refills: 3 | Status: SHIPPED
Start: 2019-07-03 | End: 2020-06-18 | Stop reason: SDUPTHER

## 2019-07-03 RX ORDER — CLOPIDOGREL BISULFATE 75 MG/1
75 TABLET ORAL DAILY
Qty: 90 TABLET | Refills: 3 | Status: SHIPPED
Start: 2019-07-03 | End: 2020-07-23 | Stop reason: SDUPTHER

## 2019-12-11 ENCOUNTER — APPOINTMENT (OUTPATIENT)
Dept: GENERAL RADIOLOGY | Age: 84
End: 2019-12-11
Payer: MEDICARE

## 2019-12-11 ENCOUNTER — HOSPITAL ENCOUNTER (OUTPATIENT)
Age: 84
Setting detail: OBSERVATION
Discharge: HOME | End: 2019-12-14
Attending: EMERGENCY MEDICINE | Admitting: INTERNAL MEDICINE
Payer: MEDICARE

## 2019-12-11 DIAGNOSIS — I48.91 ATRIAL FIBRILLATION WITH RVR (HCC): ICD-10-CM

## 2019-12-11 DIAGNOSIS — R07.9 CHEST PAIN, UNSPECIFIED TYPE: Primary | ICD-10-CM

## 2019-12-11 PROBLEM — R79.89 ELEVATED BRAIN NATRIURETIC PEPTIDE (BNP) LEVEL: Status: ACTIVE | Noted: 2019-12-11

## 2019-12-11 LAB
ANION GAP SERPL CALCULATED.3IONS-SCNC: 16 MMOL/L (ref 7–16)
BASOPHILS ABSOLUTE: 0.05 E9/L (ref 0–0.2)
BASOPHILS RELATIVE PERCENT: 0.4 % (ref 0–2)
BUN BLDV-MCNC: 32 MG/DL (ref 8–23)
CALCIUM SERPL-MCNC: 9.1 MG/DL (ref 8.6–10.2)
CHLORIDE BLD-SCNC: 103 MMOL/L (ref 98–107)
CO2: 23 MMOL/L (ref 22–29)
CREAT SERPL-MCNC: 2 MG/DL (ref 0.7–1.2)
EOSINOPHILS ABSOLUTE: 0.46 E9/L (ref 0.05–0.5)
EOSINOPHILS RELATIVE PERCENT: 3.8 % (ref 0–6)
GFR AFRICAN AMERICAN: 38
GFR NON-AFRICAN AMERICAN: 31 ML/MIN/1.73
GLUCOSE BLD-MCNC: 144 MG/DL (ref 74–99)
HCT VFR BLD CALC: 44.2 % (ref 37–54)
HEMOGLOBIN: 13.9 G/DL (ref 12.5–16.5)
IMMATURE GRANULOCYTES #: 0.05 E9/L
IMMATURE GRANULOCYTES %: 0.4 % (ref 0–5)
LYMPHOCYTES ABSOLUTE: 2.04 E9/L (ref 1.5–4)
LYMPHOCYTES RELATIVE PERCENT: 16.7 % (ref 20–42)
MAGNESIUM: 1.9 MG/DL (ref 1.6–2.6)
MCH RBC QN AUTO: 29 PG (ref 26–35)
MCHC RBC AUTO-ENTMCNC: 31.4 % (ref 32–34.5)
MCV RBC AUTO: 92.1 FL (ref 80–99.9)
MONOCYTES ABSOLUTE: 1.07 E9/L (ref 0.1–0.95)
MONOCYTES RELATIVE PERCENT: 8.7 % (ref 2–12)
NEUTROPHILS ABSOLUTE: 8.58 E9/L (ref 1.8–7.3)
NEUTROPHILS RELATIVE PERCENT: 70 % (ref 43–80)
PDW BLD-RTO: 13.2 FL (ref 11.5–15)
PLATELET # BLD: 303 E9/L (ref 130–450)
PMV BLD AUTO: 9.7 FL (ref 7–12)
POTASSIUM SERPL-SCNC: 5.8 MMOL/L (ref 3.5–5)
PRO-BNP: 2848 PG/ML (ref 0–450)
RBC # BLD: 4.8 E12/L (ref 3.8–5.8)
SODIUM BLD-SCNC: 142 MMOL/L (ref 132–146)
TROPONIN: <0.01 NG/ML (ref 0–0.03)
TSH SERPL DL<=0.05 MIU/L-ACNC: 0.01 UIU/ML (ref 0.27–4.2)
WBC # BLD: 12.3 E9/L (ref 4.5–11.5)

## 2019-12-11 PROCEDURE — 93005 ELECTROCARDIOGRAM TRACING: CPT | Performed by: NURSE PRACTITIONER

## 2019-12-11 PROCEDURE — 6370000000 HC RX 637 (ALT 250 FOR IP): Performed by: EMERGENCY MEDICINE

## 2019-12-11 PROCEDURE — 84484 ASSAY OF TROPONIN QUANT: CPT

## 2019-12-11 PROCEDURE — 2580000003 HC RX 258

## 2019-12-11 PROCEDURE — 85025 COMPLETE CBC W/AUTO DIFF WBC: CPT

## 2019-12-11 PROCEDURE — 93005 ELECTROCARDIOGRAM TRACING: CPT | Performed by: EMERGENCY MEDICINE

## 2019-12-11 PROCEDURE — 83735 ASSAY OF MAGNESIUM: CPT

## 2019-12-11 PROCEDURE — 36415 COLL VENOUS BLD VENIPUNCTURE: CPT

## 2019-12-11 PROCEDURE — 84443 ASSAY THYROID STIM HORMONE: CPT

## 2019-12-11 PROCEDURE — 80048 BASIC METABOLIC PNL TOTAL CA: CPT

## 2019-12-11 PROCEDURE — 71045 X-RAY EXAM CHEST 1 VIEW: CPT

## 2019-12-11 PROCEDURE — 83880 ASSAY OF NATRIURETIC PEPTIDE: CPT

## 2019-12-11 PROCEDURE — 99285 EMERGENCY DEPT VISIT HI MDM: CPT

## 2019-12-11 PROCEDURE — 99220 PR INITIAL OBSERVATION CARE/DAY 70 MINUTES: CPT | Performed by: NURSE PRACTITIONER

## 2019-12-11 RX ORDER — SODIUM CHLORIDE 9 MG/ML
INJECTION, SOLUTION INTRAVENOUS
Status: COMPLETED
Start: 2019-12-11 | End: 2019-12-11

## 2019-12-11 RX ORDER — ASPIRIN 81 MG/1
324 TABLET, CHEWABLE ORAL ONCE
Status: COMPLETED | OUTPATIENT
Start: 2019-12-11 | End: 2019-12-11

## 2019-12-11 RX ADMIN — ASPIRIN 81 MG 324 MG: 81 TABLET ORAL at 22:31

## 2019-12-11 RX ADMIN — SODIUM CHLORIDE 500 ML: 9 INJECTION, SOLUTION INTRAVENOUS at 22:32

## 2019-12-11 ASSESSMENT — PAIN SCALES - GENERAL: PAINLEVEL_OUTOF10: 2

## 2019-12-11 ASSESSMENT — ENCOUNTER SYMPTOMS
DIARRHEA: 0
NAUSEA: 0
VOMITING: 0
CHEST TIGHTNESS: 0
SHORTNESS OF BREATH: 1
ABDOMINAL PAIN: 0

## 2019-12-11 ASSESSMENT — PAIN DESCRIPTION - DESCRIPTORS: DESCRIPTORS: ACHING

## 2019-12-11 ASSESSMENT — PAIN DESCRIPTION - ORIENTATION: ORIENTATION: LEFT

## 2019-12-11 ASSESSMENT — PAIN DESCRIPTION - FREQUENCY: FREQUENCY: CONTINUOUS

## 2019-12-11 ASSESSMENT — PAIN DESCRIPTION - PAIN TYPE: TYPE: ACUTE PAIN

## 2019-12-11 ASSESSMENT — PAIN DESCRIPTION - LOCATION: LOCATION: CHEST

## 2019-12-12 ENCOUNTER — APPOINTMENT (OUTPATIENT)
Dept: ULTRASOUND IMAGING | Age: 84
End: 2019-12-12
Payer: MEDICARE

## 2019-12-12 PROBLEM — R07.9 CHEST PAIN: Status: ACTIVE | Noted: 2019-12-12

## 2019-12-12 LAB
ANION GAP SERPL CALCULATED.3IONS-SCNC: 13 MMOL/L (ref 7–16)
BUN BLDV-MCNC: 32 MG/DL (ref 8–23)
CALCIUM SERPL-MCNC: 8.6 MG/DL (ref 8.6–10.2)
CHLORIDE BLD-SCNC: 107 MMOL/L (ref 98–107)
CO2: 23 MMOL/L (ref 22–29)
CREAT SERPL-MCNC: 1.9 MG/DL (ref 0.7–1.2)
CREATININE URINE: 68 MG/DL (ref 40–278)
EKG ATRIAL RATE: 127 BPM
EKG Q-T INTERVAL: 314 MS
EKG QRS DURATION: 86 MS
EKG QTC CALCULATION (BAZETT): 509 MS
EKG R AXIS: -31 DEGREES
EKG T AXIS: -76 DEGREES
EKG VENTRICULAR RATE: 158 BPM
GFR AFRICAN AMERICAN: 40
GFR NON-AFRICAN AMERICAN: 33 ML/MIN/1.73
GLUCOSE BLD-MCNC: 95 MG/DL (ref 74–99)
LV EF: 55 %
LVEF MODALITY: NORMAL
METER GLUCOSE: 112 MG/DL (ref 74–99)
METER GLUCOSE: 112 MG/DL (ref 74–99)
METER GLUCOSE: 124 MG/DL (ref 74–99)
METER GLUCOSE: 126 MG/DL (ref 74–99)
METER GLUCOSE: 98 MG/DL (ref 74–99)
OSMOLALITY URINE: 421 MOSM/KG (ref 300–900)
POTASSIUM REFLEX MAGNESIUM: 4.6 MMOL/L (ref 3.5–5)
SODIUM BLD-SCNC: 143 MMOL/L (ref 132–146)
SODIUM URINE: 94 MMOL/L
TROPONIN: 0.62 NG/ML (ref 0–0.03)
TROPONIN: 0.64 NG/ML (ref 0–0.03)

## 2019-12-12 PROCEDURE — 6370000000 HC RX 637 (ALT 250 FOR IP): Performed by: NURSE PRACTITIONER

## 2019-12-12 PROCEDURE — 97165 OT EVAL LOW COMPLEX 30 MIN: CPT

## 2019-12-12 PROCEDURE — 99226 PR SBSQ OBSERVATION CARE/DAY 35 MINUTES: CPT | Performed by: INTERNAL MEDICINE

## 2019-12-12 PROCEDURE — 84484 ASSAY OF TROPONIN QUANT: CPT

## 2019-12-12 PROCEDURE — 97161 PT EVAL LOW COMPLEX 20 MIN: CPT | Performed by: PHYSICAL THERAPIST

## 2019-12-12 PROCEDURE — 83935 ASSAY OF URINE OSMOLALITY: CPT

## 2019-12-12 PROCEDURE — 97530 THERAPEUTIC ACTIVITIES: CPT

## 2019-12-12 PROCEDURE — 80048 BASIC METABOLIC PNL TOTAL CA: CPT

## 2019-12-12 PROCEDURE — 2580000003 HC RX 258: Performed by: NURSE PRACTITIONER

## 2019-12-12 PROCEDURE — 6370000000 HC RX 637 (ALT 250 FOR IP): Performed by: INTERNAL MEDICINE

## 2019-12-12 PROCEDURE — 93306 TTE W/DOPPLER COMPLETE: CPT

## 2019-12-12 PROCEDURE — 84300 ASSAY OF URINE SODIUM: CPT

## 2019-12-12 PROCEDURE — 82570 ASSAY OF URINE CREATININE: CPT

## 2019-12-12 PROCEDURE — G0378 HOSPITAL OBSERVATION PER HR: HCPCS

## 2019-12-12 PROCEDURE — 93010 ELECTROCARDIOGRAM REPORT: CPT | Performed by: INTERNAL MEDICINE

## 2019-12-12 PROCEDURE — 82962 GLUCOSE BLOOD TEST: CPT

## 2019-12-12 PROCEDURE — 97116 GAIT TRAINING THERAPY: CPT | Performed by: PHYSICAL THERAPIST

## 2019-12-12 PROCEDURE — 36415 COLL VENOUS BLD VENIPUNCTURE: CPT

## 2019-12-12 PROCEDURE — 76770 US EXAM ABDO BACK WALL COMP: CPT

## 2019-12-12 RX ORDER — DEXTROSE MONOHYDRATE 50 MG/ML
100 INJECTION, SOLUTION INTRAVENOUS PRN
Status: DISCONTINUED | OUTPATIENT
Start: 2019-12-12 | End: 2019-12-14 | Stop reason: HOSPADM

## 2019-12-12 RX ORDER — GABAPENTIN 100 MG/1
200 CAPSULE ORAL 2 TIMES DAILY
Status: DISCONTINUED | OUTPATIENT
Start: 2019-12-12 | End: 2019-12-14 | Stop reason: HOSPADM

## 2019-12-12 RX ORDER — ACETAMINOPHEN 325 MG/1
650 TABLET ORAL
Status: DISCONTINUED | OUTPATIENT
Start: 2019-12-12 | End: 2019-12-14 | Stop reason: HOSPADM

## 2019-12-12 RX ORDER — SODIUM CHLORIDE 0.9 % (FLUSH) 0.9 %
10 SYRINGE (ML) INJECTION EVERY 12 HOURS SCHEDULED
Status: DISCONTINUED | OUTPATIENT
Start: 2019-12-12 | End: 2019-12-14 | Stop reason: HOSPADM

## 2019-12-12 RX ORDER — SODIUM CHLORIDE 9 MG/ML
INJECTION, SOLUTION INTRAVENOUS CONTINUOUS
Status: DISCONTINUED | OUTPATIENT
Start: 2019-12-12 | End: 2019-12-12

## 2019-12-12 RX ORDER — CLOPIDOGREL BISULFATE 75 MG/1
75 TABLET ORAL DAILY
Status: DISCONTINUED | OUTPATIENT
Start: 2019-12-12 | End: 2019-12-14 | Stop reason: HOSPADM

## 2019-12-12 RX ORDER — SODIUM CHLORIDE 0.9 % (FLUSH) 0.9 %
10 SYRINGE (ML) INJECTION PRN
Status: DISCONTINUED | OUTPATIENT
Start: 2019-12-12 | End: 2019-12-14 | Stop reason: HOSPADM

## 2019-12-12 RX ORDER — ONDANSETRON 2 MG/ML
4 INJECTION INTRAMUSCULAR; INTRAVENOUS EVERY 6 HOURS PRN
Status: DISCONTINUED | OUTPATIENT
Start: 2019-12-12 | End: 2019-12-14 | Stop reason: HOSPADM

## 2019-12-12 RX ORDER — METOPROLOL SUCCINATE 100 MG/1
100 TABLET, EXTENDED RELEASE ORAL DAILY
Status: DISCONTINUED | OUTPATIENT
Start: 2019-12-12 | End: 2019-12-14 | Stop reason: HOSPADM

## 2019-12-12 RX ORDER — LEVOTHYROXINE SODIUM 175 UG/1
175 TABLET ORAL DAILY
Status: DISCONTINUED | OUTPATIENT
Start: 2019-12-12 | End: 2019-12-14 | Stop reason: HOSPADM

## 2019-12-12 RX ORDER — DEXTROSE MONOHYDRATE 25 G/50ML
12.5 INJECTION, SOLUTION INTRAVENOUS PRN
Status: DISCONTINUED | OUTPATIENT
Start: 2019-12-12 | End: 2019-12-14 | Stop reason: HOSPADM

## 2019-12-12 RX ORDER — ATORVASTATIN CALCIUM 40 MG/1
40 TABLET, FILM COATED ORAL NIGHTLY
Status: DISCONTINUED | OUTPATIENT
Start: 2019-12-12 | End: 2019-12-14 | Stop reason: HOSPADM

## 2019-12-12 RX ORDER — NICOTINE POLACRILEX 4 MG
15 LOZENGE BUCCAL PRN
Status: DISCONTINUED | OUTPATIENT
Start: 2019-12-12 | End: 2019-12-14 | Stop reason: HOSPADM

## 2019-12-12 RX ADMIN — CLOPIDOGREL BISULFATE 75 MG: 75 TABLET ORAL at 13:14

## 2019-12-12 RX ADMIN — GABAPENTIN 200 MG: 100 CAPSULE ORAL at 13:14

## 2019-12-12 RX ADMIN — METOPROLOL SUCCINATE 100 MG: 100 TABLET, EXTENDED RELEASE ORAL at 13:14

## 2019-12-12 RX ADMIN — Medication 10 ML: at 21:14

## 2019-12-12 RX ADMIN — ATORVASTATIN CALCIUM 40 MG: 40 TABLET, FILM COATED ORAL at 21:14

## 2019-12-12 RX ADMIN — SODIUM CHLORIDE: 9 INJECTION, SOLUTION INTRAVENOUS at 18:57

## 2019-12-12 RX ADMIN — SODIUM CHLORIDE: 9 INJECTION, SOLUTION INTRAVENOUS at 01:16

## 2019-12-12 RX ADMIN — Medication 10 ML: at 13:18

## 2019-12-12 RX ADMIN — NITROGLYCERIN 1 INCH: 20 OINTMENT TOPICAL at 21:21

## 2019-12-12 ASSESSMENT — PAIN SCALES - GENERAL
PAINLEVEL_OUTOF10: 0

## 2019-12-13 LAB
ANION GAP SERPL CALCULATED.3IONS-SCNC: 10 MMOL/L (ref 7–16)
ANION GAP SERPL CALCULATED.3IONS-SCNC: 12 MMOL/L (ref 7–16)
BASOPHILS ABSOLUTE: 0.05 E9/L (ref 0–0.2)
BASOPHILS RELATIVE PERCENT: 0.6 % (ref 0–2)
BUN BLDV-MCNC: 29 MG/DL (ref 8–23)
BUN BLDV-MCNC: 30 MG/DL (ref 8–23)
CALCIUM SERPL-MCNC: 9.1 MG/DL (ref 8.6–10.2)
CALCIUM SERPL-MCNC: 9.2 MG/DL (ref 8.6–10.2)
CHLORIDE BLD-SCNC: 105 MMOL/L (ref 98–107)
CHLORIDE BLD-SCNC: 107 MMOL/L (ref 98–107)
CO2: 23 MMOL/L (ref 22–29)
CO2: 25 MMOL/L (ref 22–29)
CREAT SERPL-MCNC: 1.8 MG/DL (ref 0.7–1.2)
CREAT SERPL-MCNC: 1.9 MG/DL (ref 0.7–1.2)
EKG ATRIAL RATE: 87 BPM
EKG Q-T INTERVAL: 284 MS
EKG QRS DURATION: 70 MS
EKG QTC CALCULATION (BAZETT): 476 MS
EKG R AXIS: -31 DEGREES
EKG T AXIS: -67 DEGREES
EKG VENTRICULAR RATE: 169 BPM
EOSINOPHILS ABSOLUTE: 0.4 E9/L (ref 0.05–0.5)
EOSINOPHILS RELATIVE PERCENT: 4.5 % (ref 0–6)
GFR AFRICAN AMERICAN: 40
GFR AFRICAN AMERICAN: 43
GFR NON-AFRICAN AMERICAN: 33 ML/MIN/1.73
GFR NON-AFRICAN AMERICAN: 35 ML/MIN/1.73
GLUCOSE BLD-MCNC: 108 MG/DL (ref 74–99)
GLUCOSE BLD-MCNC: 121 MG/DL (ref 74–99)
HCT VFR BLD CALC: 37.7 % (ref 37–54)
HEMOGLOBIN: 12 G/DL (ref 12.5–16.5)
IMMATURE GRANULOCYTES #: 0.04 E9/L
IMMATURE GRANULOCYTES %: 0.4 % (ref 0–5)
LYMPHOCYTES ABSOLUTE: 1.98 E9/L (ref 1.5–4)
LYMPHOCYTES RELATIVE PERCENT: 22.1 % (ref 20–42)
MCH RBC QN AUTO: 29.2 PG (ref 26–35)
MCHC RBC AUTO-ENTMCNC: 31.8 % (ref 32–34.5)
MCV RBC AUTO: 91.7 FL (ref 80–99.9)
METER GLUCOSE: 102 MG/DL (ref 74–99)
METER GLUCOSE: 112 MG/DL (ref 74–99)
METER GLUCOSE: 118 MG/DL (ref 74–99)
METER GLUCOSE: 145 MG/DL (ref 74–99)
MONOCYTES ABSOLUTE: 1 E9/L (ref 0.1–0.95)
MONOCYTES RELATIVE PERCENT: 11.2 % (ref 2–12)
NEUTROPHILS ABSOLUTE: 5.49 E9/L (ref 1.8–7.3)
NEUTROPHILS RELATIVE PERCENT: 61.2 % (ref 43–80)
PDW BLD-RTO: 13.2 FL (ref 11.5–15)
PLATELET # BLD: 267 E9/L (ref 130–450)
PMV BLD AUTO: 9.7 FL (ref 7–12)
POTASSIUM SERPL-SCNC: 4.7 MMOL/L (ref 3.5–5)
POTASSIUM SERPL-SCNC: 5.2 MMOL/L (ref 3.5–5)
RBC # BLD: 4.11 E12/L (ref 3.8–5.8)
SODIUM BLD-SCNC: 140 MMOL/L (ref 132–146)
SODIUM BLD-SCNC: 142 MMOL/L (ref 132–146)
T3 TOTAL: 79.31 NG/DL (ref 80–200)
T4 FREE: 1.5 NG/DL (ref 0.93–1.7)
WBC # BLD: 9 E9/L (ref 4.5–11.5)

## 2019-12-13 PROCEDURE — 6370000000 HC RX 637 (ALT 250 FOR IP): Performed by: NURSE PRACTITIONER

## 2019-12-13 PROCEDURE — 97116 GAIT TRAINING THERAPY: CPT

## 2019-12-13 PROCEDURE — 2580000003 HC RX 258: Performed by: NURSE PRACTITIONER

## 2019-12-13 PROCEDURE — 82962 GLUCOSE BLOOD TEST: CPT

## 2019-12-13 PROCEDURE — 84480 ASSAY TRIIODOTHYRONINE (T3): CPT

## 2019-12-13 PROCEDURE — 99226 PR SBSQ OBSERVATION CARE/DAY 35 MINUTES: CPT | Performed by: INTERNAL MEDICINE

## 2019-12-13 PROCEDURE — G0378 HOSPITAL OBSERVATION PER HR: HCPCS

## 2019-12-13 PROCEDURE — 80048 BASIC METABOLIC PNL TOTAL CA: CPT

## 2019-12-13 PROCEDURE — 84439 ASSAY OF FREE THYROXINE: CPT

## 2019-12-13 PROCEDURE — 36415 COLL VENOUS BLD VENIPUNCTURE: CPT

## 2019-12-13 PROCEDURE — 6370000000 HC RX 637 (ALT 250 FOR IP): Performed by: INTERNAL MEDICINE

## 2019-12-13 PROCEDURE — 85025 COMPLETE CBC W/AUTO DIFF WBC: CPT

## 2019-12-13 PROCEDURE — 93010 ELECTROCARDIOGRAM REPORT: CPT | Performed by: INTERNAL MEDICINE

## 2019-12-13 RX ORDER — AMLODIPINE BESYLATE 5 MG/1
5 TABLET ORAL DAILY
Status: DISCONTINUED | OUTPATIENT
Start: 2019-12-13 | End: 2019-12-13

## 2019-12-13 RX ORDER — DILTIAZEM HYDROCHLORIDE 120 MG/1
120 CAPSULE, COATED, EXTENDED RELEASE ORAL DAILY
Status: DISCONTINUED | OUTPATIENT
Start: 2019-12-13 | End: 2019-12-14 | Stop reason: HOSPADM

## 2019-12-13 RX ORDER — ACETAMINOPHEN 325 MG/1
650 TABLET ORAL EVERY 4 HOURS PRN
Status: DISCONTINUED | OUTPATIENT
Start: 2019-12-13 | End: 2019-12-14 | Stop reason: HOSPADM

## 2019-12-13 RX ORDER — ISOSORBIDE MONONITRATE 60 MG/1
60 TABLET, EXTENDED RELEASE ORAL DAILY
Status: DISCONTINUED | OUTPATIENT
Start: 2019-12-13 | End: 2019-12-14 | Stop reason: HOSPADM

## 2019-12-13 RX ADMIN — AMLODIPINE BESYLATE 5 MG: 5 TABLET ORAL at 11:00

## 2019-12-13 RX ADMIN — NITROGLYCERIN 1 INCH: 20 OINTMENT TOPICAL at 06:33

## 2019-12-13 RX ADMIN — DILTIAZEM HYDROCHLORIDE 120 MG: 120 CAPSULE, COATED, EXTENDED RELEASE ORAL at 15:37

## 2019-12-13 RX ADMIN — METOPROLOL SUCCINATE 100 MG: 100 TABLET, EXTENDED RELEASE ORAL at 09:50

## 2019-12-13 RX ADMIN — LEVOTHYROXINE SODIUM 175 MCG: 175 TABLET ORAL at 06:33

## 2019-12-13 RX ADMIN — Medication 10 ML: at 11:00

## 2019-12-13 RX ADMIN — CLOPIDOGREL BISULFATE 75 MG: 75 TABLET ORAL at 09:50

## 2019-12-13 RX ADMIN — APIXABAN 5 MG: 5 TABLET, FILM COATED ORAL at 21:04

## 2019-12-13 RX ADMIN — ISOSORBIDE MONONITRATE 60 MG: 60 TABLET, EXTENDED RELEASE ORAL at 11:00

## 2019-12-13 RX ADMIN — Medication 10 ML: at 21:04

## 2019-12-13 RX ADMIN — GABAPENTIN 100 MG: 100 CAPSULE ORAL at 09:47

## 2019-12-13 RX ADMIN — APIXABAN 5 MG: 5 TABLET, FILM COATED ORAL at 11:00

## 2019-12-13 RX ADMIN — ACETAMINOPHEN 650 MG: 325 TABLET, FILM COATED ORAL at 13:03

## 2019-12-13 RX ADMIN — ATORVASTATIN CALCIUM 40 MG: 40 TABLET, FILM COATED ORAL at 21:04

## 2019-12-13 ASSESSMENT — PAIN SCALES - GENERAL
PAINLEVEL_OUTOF10: 7
PAINLEVEL_OUTOF10: 0

## 2019-12-14 VITALS
HEART RATE: 73 BPM | TEMPERATURE: 97.8 F | OXYGEN SATURATION: 96 % | WEIGHT: 206 LBS | HEIGHT: 67 IN | BODY MASS INDEX: 32.33 KG/M2 | DIASTOLIC BLOOD PRESSURE: 70 MMHG | SYSTOLIC BLOOD PRESSURE: 136 MMHG | RESPIRATION RATE: 18 BRPM

## 2019-12-14 LAB
ANION GAP SERPL CALCULATED.3IONS-SCNC: 11 MMOL/L (ref 7–16)
BUN BLDV-MCNC: 29 MG/DL (ref 8–23)
CALCIUM SERPL-MCNC: 9.1 MG/DL (ref 8.6–10.2)
CHLORIDE BLD-SCNC: 107 MMOL/L (ref 98–107)
CO2: 23 MMOL/L (ref 22–29)
CREAT SERPL-MCNC: 1.8 MG/DL (ref 0.7–1.2)
GFR AFRICAN AMERICAN: 43
GFR NON-AFRICAN AMERICAN: 35 ML/MIN/1.73
GLUCOSE BLD-MCNC: 115 MG/DL (ref 74–99)
HCT VFR BLD CALC: 37.8 % (ref 37–54)
HEMOGLOBIN: 12.1 G/DL (ref 12.5–16.5)
MCH RBC QN AUTO: 29.4 PG (ref 26–35)
MCHC RBC AUTO-ENTMCNC: 32 % (ref 32–34.5)
MCV RBC AUTO: 92 FL (ref 80–99.9)
METER GLUCOSE: 111 MG/DL (ref 74–99)
METER GLUCOSE: 123 MG/DL (ref 74–99)
METER GLUCOSE: 143 MG/DL (ref 74–99)
PDW BLD-RTO: 13.3 FL (ref 11.5–15)
PLATELET # BLD: 289 E9/L (ref 130–450)
PMV BLD AUTO: 9.9 FL (ref 7–12)
POTASSIUM SERPL-SCNC: 4.7 MMOL/L (ref 3.5–5)
RBC # BLD: 4.11 E12/L (ref 3.8–5.8)
SODIUM BLD-SCNC: 141 MMOL/L (ref 132–146)
WBC # BLD: 10.5 E9/L (ref 4.5–11.5)

## 2019-12-14 PROCEDURE — 85027 COMPLETE CBC AUTOMATED: CPT

## 2019-12-14 PROCEDURE — 6370000000 HC RX 637 (ALT 250 FOR IP): Performed by: NURSE PRACTITIONER

## 2019-12-14 PROCEDURE — 99217 PR OBSERVATION CARE DISCHARGE MANAGEMENT: CPT | Performed by: INTERNAL MEDICINE

## 2019-12-14 PROCEDURE — 36415 COLL VENOUS BLD VENIPUNCTURE: CPT

## 2019-12-14 PROCEDURE — 80048 BASIC METABOLIC PNL TOTAL CA: CPT

## 2019-12-14 PROCEDURE — 6370000000 HC RX 637 (ALT 250 FOR IP): Performed by: INTERNAL MEDICINE

## 2019-12-14 PROCEDURE — 82962 GLUCOSE BLOOD TEST: CPT

## 2019-12-14 PROCEDURE — G0378 HOSPITAL OBSERVATION PER HR: HCPCS

## 2019-12-14 RX ORDER — DILTIAZEM HYDROCHLORIDE 120 MG/1
120 CAPSULE, COATED, EXTENDED RELEASE ORAL DAILY
Qty: 90 CAPSULE | Refills: 3 | Status: SHIPPED | OUTPATIENT
Start: 2019-12-15 | End: 2020-08-11 | Stop reason: SDUPTHER

## 2019-12-14 RX ORDER — ISOSORBIDE MONONITRATE 60 MG/1
60 TABLET, EXTENDED RELEASE ORAL DAILY
Qty: 90 TABLET | Refills: 3 | Status: SHIPPED | OUTPATIENT
Start: 2019-12-15 | End: 2020-08-11 | Stop reason: SDUPTHER

## 2019-12-14 RX ORDER — NITROGLYCERIN 0.4 MG/1
TABLET SUBLINGUAL
Qty: 25 TABLET | Refills: 1 | Status: SHIPPED | OUTPATIENT
Start: 2019-12-14

## 2019-12-14 RX ADMIN — DILTIAZEM HYDROCHLORIDE 120 MG: 120 CAPSULE, COATED, EXTENDED RELEASE ORAL at 10:20

## 2019-12-14 RX ADMIN — APIXABAN 5 MG: 5 TABLET, FILM COATED ORAL at 19:42

## 2019-12-14 RX ADMIN — CLOPIDOGREL BISULFATE 75 MG: 75 TABLET ORAL at 14:42

## 2019-12-14 RX ADMIN — METOPROLOL SUCCINATE 100 MG: 100 TABLET, EXTENDED RELEASE ORAL at 10:20

## 2019-12-14 RX ADMIN — ISOSORBIDE MONONITRATE 60 MG: 60 TABLET, EXTENDED RELEASE ORAL at 10:20

## 2019-12-14 RX ADMIN — GABAPENTIN 200 MG: 100 CAPSULE ORAL at 10:20

## 2019-12-14 RX ADMIN — APIXABAN 5 MG: 5 TABLET, FILM COATED ORAL at 10:24

## 2019-12-14 RX ADMIN — ACETAMINOPHEN 650 MG: 325 TABLET, FILM COATED ORAL at 06:11

## 2019-12-14 RX ADMIN — LEVOTHYROXINE SODIUM 175 MCG: 175 TABLET ORAL at 06:11

## 2019-12-14 ASSESSMENT — PAIN SCALES - GENERAL
PAINLEVEL_OUTOF10: 0

## 2019-12-20 RX ORDER — METFORMIN HYDROCHLORIDE 500 MG/1
500 TABLET, EXTENDED RELEASE ORAL
Qty: 90 TABLET | Refills: 0 | OUTPATIENT
Start: 2019-12-20

## 2019-12-31 RX ORDER — METFORMIN HYDROCHLORIDE 500 MG/1
TABLET, EXTENDED RELEASE ORAL
Qty: 30 TABLET | Refills: 0 | Status: SHIPPED | OUTPATIENT
Start: 2019-12-31 | End: 2020-01-07 | Stop reason: SDUPTHER

## 2020-01-01 ENCOUNTER — OFFICE VISIT (OUTPATIENT)
Dept: FAMILY MEDICINE CLINIC | Age: 85
End: 2020-01-01
Payer: MEDICARE

## 2020-01-01 ENCOUNTER — TELEPHONE (OUTPATIENT)
Dept: CARDIOLOGY CLINIC | Age: 85
End: 2020-01-01

## 2020-01-01 ENCOUNTER — TELEPHONE (OUTPATIENT)
Dept: ADMINISTRATIVE | Age: 85
End: 2020-01-01

## 2020-01-01 ENCOUNTER — VIRTUAL VISIT (OUTPATIENT)
Dept: FAMILY MEDICINE CLINIC | Age: 85
End: 2020-01-01
Payer: MEDICARE

## 2020-01-01 VITALS
RESPIRATION RATE: 16 BRPM | HEIGHT: 68 IN | OXYGEN SATURATION: 93 % | HEART RATE: 60 BPM | BODY MASS INDEX: 30.04 KG/M2 | TEMPERATURE: 97.4 F | SYSTOLIC BLOOD PRESSURE: 110 MMHG | DIASTOLIC BLOOD PRESSURE: 50 MMHG

## 2020-01-01 LAB — HBA1C MFR BLD: 5.9 %

## 2020-01-01 PROCEDURE — 4040F PNEUMOC VAC/ADMIN/RCVD: CPT | Performed by: FAMILY MEDICINE

## 2020-01-01 PROCEDURE — G8484 FLU IMMUNIZE NO ADMIN: HCPCS | Performed by: FAMILY MEDICINE

## 2020-01-01 PROCEDURE — G8417 CALC BMI ABV UP PARAM F/U: HCPCS | Performed by: FAMILY MEDICINE

## 2020-01-01 PROCEDURE — G8427 DOCREV CUR MEDS BY ELIG CLIN: HCPCS | Performed by: FAMILY MEDICINE

## 2020-01-01 PROCEDURE — 99214 OFFICE O/P EST MOD 30 MIN: CPT | Performed by: FAMILY MEDICINE

## 2020-01-01 PROCEDURE — 83036 HEMOGLOBIN GLYCOSYLATED A1C: CPT | Performed by: FAMILY MEDICINE

## 2020-01-01 PROCEDURE — 99442 PR PHYS/QHP TELEPHONE EVALUATION 11-20 MIN: CPT | Performed by: FAMILY MEDICINE

## 2020-01-01 PROCEDURE — 1036F TOBACCO NON-USER: CPT | Performed by: FAMILY MEDICINE

## 2020-01-01 PROCEDURE — 1123F ACP DISCUSS/DSCN MKR DOCD: CPT | Performed by: FAMILY MEDICINE

## 2020-01-01 RX ORDER — GABAPENTIN 100 MG/1
100 CAPSULE ORAL
Qty: 90 CAPSULE | Refills: 0 | Status: SHIPPED
Start: 2020-01-01 | End: 2021-01-01

## 2020-01-01 RX ORDER — ATORVASTATIN CALCIUM 40 MG/1
40 TABLET, FILM COATED ORAL NIGHTLY
Qty: 90 TABLET | Refills: 0 | Status: SHIPPED
Start: 2020-01-01 | End: 2021-01-01

## 2020-01-01 RX ORDER — DILTIAZEM HYDROCHLORIDE 120 MG/1
120 CAPSULE, COATED, EXTENDED RELEASE ORAL DAILY
Qty: 90 CAPSULE | Refills: 3 | Status: CANCELLED | OUTPATIENT
Start: 2020-01-01

## 2020-01-01 RX ORDER — LEVOTHYROXINE SODIUM 175 UG/1
175 TABLET ORAL DAILY
Qty: 90 TABLET | Refills: 0 | Status: SHIPPED
Start: 2020-01-01 | End: 2021-01-01

## 2020-01-01 RX ORDER — MIRTAZAPINE 15 MG/1
15 TABLET, FILM COATED ORAL NIGHTLY
Qty: 30 TABLET | Refills: 3 | Status: SHIPPED
Start: 2020-01-01 | End: 2021-01-01

## 2020-01-01 RX ORDER — ISOSORBIDE MONONITRATE 60 MG/1
60 TABLET, EXTENDED RELEASE ORAL DAILY
Qty: 90 TABLET | Refills: 3 | Status: CANCELLED | OUTPATIENT
Start: 2020-01-01

## 2020-01-01 RX ORDER — METOPROLOL SUCCINATE 100 MG/1
TABLET, EXTENDED RELEASE ORAL
Qty: 30 TABLET | Refills: 0 | Status: SHIPPED
Start: 2020-01-01 | End: 2020-01-01 | Stop reason: SDUPTHER

## 2020-01-01 RX ORDER — CLOPIDOGREL BISULFATE 75 MG/1
75 TABLET ORAL DAILY
Qty: 90 TABLET | Refills: 0 | Status: SHIPPED
Start: 2020-01-01 | End: 2021-01-01

## 2020-01-01 RX ORDER — METOPROLOL SUCCINATE 100 MG/1
100 TABLET, EXTENDED RELEASE ORAL DAILY
Qty: 90 TABLET | Refills: 0 | Status: SHIPPED
Start: 2020-01-01 | End: 2021-01-01

## 2020-01-07 ENCOUNTER — OFFICE VISIT (OUTPATIENT)
Dept: FAMILY MEDICINE CLINIC | Age: 85
End: 2020-01-07
Payer: MEDICARE

## 2020-01-07 VITALS
HEART RATE: 74 BPM | DIASTOLIC BLOOD PRESSURE: 66 MMHG | RESPIRATION RATE: 16 BRPM | OXYGEN SATURATION: 91 % | TEMPERATURE: 96.7 F | SYSTOLIC BLOOD PRESSURE: 138 MMHG

## 2020-01-07 PROCEDURE — G8417 CALC BMI ABV UP PARAM F/U: HCPCS | Performed by: FAMILY MEDICINE

## 2020-01-07 PROCEDURE — 99214 OFFICE O/P EST MOD 30 MIN: CPT | Performed by: FAMILY MEDICINE

## 2020-01-07 PROCEDURE — G8427 DOCREV CUR MEDS BY ELIG CLIN: HCPCS | Performed by: FAMILY MEDICINE

## 2020-01-07 PROCEDURE — 83036 HEMOGLOBIN GLYCOSYLATED A1C: CPT | Performed by: FAMILY MEDICINE

## 2020-01-07 PROCEDURE — 1123F ACP DISCUSS/DSCN MKR DOCD: CPT | Performed by: FAMILY MEDICINE

## 2020-01-07 PROCEDURE — G8484 FLU IMMUNIZE NO ADMIN: HCPCS | Performed by: FAMILY MEDICINE

## 2020-01-07 PROCEDURE — 1036F TOBACCO NON-USER: CPT | Performed by: FAMILY MEDICINE

## 2020-01-07 PROCEDURE — 1111F DSCHRG MED/CURRENT MED MERGE: CPT | Performed by: FAMILY MEDICINE

## 2020-01-07 PROCEDURE — 4040F PNEUMOC VAC/ADMIN/RCVD: CPT | Performed by: FAMILY MEDICINE

## 2020-01-07 RX ORDER — LANOLIN ALCOHOL/MO/W.PET/CERES
1000 CREAM (GRAM) TOPICAL DAILY
COMMUNITY

## 2020-01-07 RX ORDER — GABAPENTIN 100 MG/1
100 CAPSULE ORAL
Qty: 90 CAPSULE | Refills: 0 | Status: SHIPPED
Start: 2020-01-07 | End: 2020-05-21 | Stop reason: SDUPTHER

## 2020-01-07 RX ORDER — METFORMIN HYDROCHLORIDE 500 MG/1
TABLET, EXTENDED RELEASE ORAL
Qty: 90 TABLET | Refills: 2 | Status: SHIPPED
Start: 2020-01-07 | End: 2020-06-29 | Stop reason: ALTCHOICE

## 2020-01-07 ASSESSMENT — PATIENT HEALTH QUESTIONNAIRE - PHQ9
1. LITTLE INTEREST OR PLEASURE IN DOING THINGS: 0
SUM OF ALL RESPONSES TO PHQ9 QUESTIONS 1 & 2: 0
SUM OF ALL RESPONSES TO PHQ QUESTIONS 1-9: 0
2. FEELING DOWN, DEPRESSED OR HOPELESS: 0
SUM OF ALL RESPONSES TO PHQ QUESTIONS 1-9: 0

## 2020-01-07 NOTE — PROGRESS NOTES
Post-Discharge Transitional Care Management Services or Hospital Follow Up      Yuli Joseph   YOB: 1928    Date of Office Visit:  1/7/2020  Date of Hospital Admission: 12/11/19  Date of Hospital Discharge: 12/14/19  Risk of hospital readmission (high >=14%.  Medium >=10%) :Readmission Risk Score: 13      Care management risk score Rising risk (score 2-5) and Complex Care (Scores >=6): 5     Non face to face  following discharge, date last encounter closed (first attempt may have been earlier): *No documented post hospital discharge outreach found in the last 14 days    Call initiated 2 business days of discharge: *No response recorded in the last 14 days    Patient Active Problem List   Diagnosis    Sepsis ([facility] [address]    Tachycardia    Acute kidney injury ([address]    Nausea & vomiting    Leukocytosis    Chronic diarrhea    GERD (gastroesophageal reflux disease)    Thyroid disease    Hypertension    DM (diabetes mellitus) ([facility] [address]    Overactive bladder    Type 2 diabetes mellitus with kidney complication, without long-term current use of insulin (HCC)    Colitis    Fever    Altered mental status    Liver abscess    Protein malnutrition ([address]    Acute MI ([address]    H/O acute myocardial infarction    Gastrointestinal hemorrhage with melena    Acute blood loss anemia    Near syncope    Atrial fibrillation with RVR (HCC)    Chest pain, unspecified    Elevated brain natriuretic peptide (BNP) level    Chest pain       No Known Allergies    Medications listed as ordered at the time of discharge from hospital   Anastasiia, [address] [place] [address] Medication Instructions JOCELYNN:    Printed on:01/07/20 0106   Medication Information                      acetaminophen (TYLENOL) 325 MG tablet  Take 650 mg by mouth every morning (before breakfast)             atorvastatin (LIPITOR) 40 MG tablet  Take 1 tablet by mouth nightly             clopidogrel (PLAVIX) 75 MG tablet  Take 1 tablet by mouth daily extraocular eye movements intact, conjunctivae normal  ENT: tympanic membrane, external ear and ear canal normal bilaterally, nose without deformity, nasal mucosa and turbinates normal without polyps  Neck: supple and non-tender without mass, no thyromegaly or thyroid nodules, no cervical lymphadenopathy  Pulmonary/Chest: clear to auscultation bilaterally- no wheezes, rales or rhonchi, normal air movement, no respiratory distress  Cardiovascular: normal rate, regular rhythm, normal S1 and S2, no murmurs, rubs, clicks, or gallops, distal pulses intact, no carotid bruits  Abdomen: soft, non-tender, non-distended, normal bowel sounds, no masses or organomegaly  Extremities: no cyanosis, clubbing or edema  Musculoskeletal: normal range of motion, no joint swelling, deformity or tenderness  Neurologic: reflexes normal and symmetric, no cranial nerve deficit, gait, coordination and speech normal    Assessment/Plan:  1. Atrial fibrillation with RVR ([facility])  CONTROLLED    2. Type 2 diabetes mellitus with hyperosmolarity without coma, without long-term current use of insulin ([facility])  STABLE  - POCT glycosylated hemoglobin (Hb A1C)    3. Other chest pain  IMPROVED    4. Gastrointestinal hemorrhage with melena  ACTIVE     5.  Essential hypertension  CONTROLLED        Medical Decision Making: high complexity

## 2020-01-15 VITALS
WEIGHT: 201 LBS | BODY MASS INDEX: 30.46 KG/M2 | SYSTOLIC BLOOD PRESSURE: 142 MMHG | DIASTOLIC BLOOD PRESSURE: 70 MMHG | HEART RATE: 84 BPM | HEIGHT: 68 IN

## 2020-01-15 RX ORDER — METOPROLOL TARTRATE 50 MG/1
50 TABLET, FILM COATED ORAL 2 TIMES DAILY
COMMUNITY
End: 2020-01-31

## 2020-01-22 NOTE — PROGRESS NOTES
[facility] Cardiology Progress Note  Dr. Rebecca Jimenes      Referring Physician: Georgie Webb MD  CHIEF COMPLAINT:   Chief Complaint   Patient presents with    Atrial Fibrillation     19 hospital follow-up for new onset a fib. Pt has no cardiac complaints today    Other     Pt was discharged on Eliquis due to New Afib. Pt is not taking it. HISTORY OF PRESENT ILLNESS:   80year old male with recent non-STEMI found to have severe multivessel disease and recent (17) PCI to LAD, RCA and 1st diagonal branch with BMS, a week later patient had acute inferior wall MI due to in-stent thrombosis, patient had PCI, is here for follow-up appointment. Patient denies any chest pain, no lightheadedness, no dizziness, no palpitations, no pedal edema, no PND, no orthopnea, no syncope, no presyncopal episodes.   Functional capacity is limited due to shortness of breath        Past Medical History:   Diagnosis Date    Acute blood loss anemia 2017    Basal cell carcinoma 2018    Chronic kidney disease     Colitis     Diabetes ([address]     GERD (gastroesophageal reflux disease)     Hypertension     Liver abscess     Overactive bladder     Thyroid disease          Past Surgical History:   Procedure Laterality Date    APPENDECTOMY      APPENDECTOMY  193    ATHERECTOMY      BACK SURGERY      lumbar 1,7    CARDIAC CATHETERIZATION  2017    Dr. Hernesto Reeves     [address], NON-  2016    Dr Iris Alex, [place]      COLONOSCOPY      CORONARY ANGIOPLASTY WITH STENT PLACEMENT Right 2017    Dr Cody Fairbanks x 2 LAD x 1 diagonal x 1 RCA    DIAGNOSTIC CARDIAC CATH LAB PROCEDURE  2017; 17    Dr. Candance Brighter Bilateral     LIVER SURGERY      SKIN BIOPSY      ear    TOTAL KNEE ARTHROPLASTY Bilateral          Current Outpatient Medications   Medication Sig Dispense involving the lateral wall, mild, large defect involving the inferior wall. 3. Normal elft ventricular systolic function with normal wall motion. 4. There is no old comparison study    Angiography: 5/9/17 Successful salvage coronary artery intervention with drug-eluting  stent deployment to mid dominant right coronary artery. 4/30/17 1. Significant LAD disease with successful deployment of 3.5 mm x 28 mm and  3.5 mm x 12 mm Vision bare metal stent; bare metal stent was chosen because we  are not sure about the patient's compliance. The patient is also an  57-year-old male with poor kidney function. I am not sure if he is going to  have any procedures in the near future, so that is why we chose bare metal  stent over drug-eluting stent. 2.  Successful percutaneous coronary intervention with deployment of 2.25 mm x  18 mm bare metal stent in first diagonal branch.   3.  Successful deployment of a 2.5 mm x 15 mm Vision bare metal stent in mid  RCA.       Cardiology Labs: BMP:    Lab Results   Component Value Date     12/14/2019    K 4.7 12/14/2019    K 4.6 12/12/2019     12/14/2019    CO2 23 12/14/2019    BUN 29 12/14/2019    CREATININE 1.8 12/14/2019     CMP:    Lab Results   Component Value Date     12/14/2019    K 4.7 12/14/2019    K 4.6 12/12/2019     12/14/2019    CO2 23 12/14/2019    BUN 29 12/14/2019    CREATININE 1.8 12/14/2019    PROT 7.2 07/12/2018     CBC:    Lab Results   Component Value Date    WBC 10.5 12/14/2019    RBC 4.11 12/14/2019    HGB 12.1 12/14/2019    HCT 37.8 12/14/2019    MCV 92.0 12/14/2019    RDW 13.3 12/14/2019     12/14/2019     PT/INR:  No results found for: PTINR  PT/INR Warfarin:  No components found for: PTPATWAR, PTINRWAR  PTT:    Lab Results   Component Value Date    APTT 28.5 06/26/2017     PTT Heparin:  No components found for: APTTHEP  Magnesium:    Lab Results   Component Value Date    MG 1.9 12/11/2019     TSH:    Lab Results   Component

## 2020-01-31 ENCOUNTER — OFFICE VISIT (OUTPATIENT)
Dept: CARDIOLOGY CLINIC | Age: 85
End: 2020-01-31
Payer: MEDICARE

## 2020-01-31 VITALS
DIASTOLIC BLOOD PRESSURE: 78 MMHG | HEIGHT: 68 IN | BODY MASS INDEX: 30.31 KG/M2 | WEIGHT: 200 LBS | SYSTOLIC BLOOD PRESSURE: 144 MMHG

## 2020-01-31 PROCEDURE — 93000 ELECTROCARDIOGRAM COMPLETE: CPT | Performed by: INTERNAL MEDICINE

## 2020-01-31 PROCEDURE — 99213 OFFICE O/P EST LOW 20 MIN: CPT | Performed by: INTERNAL MEDICINE

## 2020-02-10 ENCOUNTER — OFFICE VISIT (OUTPATIENT)
Dept: FAMILY MEDICINE CLINIC | Age: 85
End: 2020-02-10
Payer: MEDICARE

## 2020-02-10 VITALS
DIASTOLIC BLOOD PRESSURE: 62 MMHG | WEIGHT: 199.7 LBS | OXYGEN SATURATION: 96 % | BODY MASS INDEX: 30.26 KG/M2 | HEIGHT: 68 IN | HEART RATE: 83 BPM | SYSTOLIC BLOOD PRESSURE: 118 MMHG

## 2020-02-10 PROBLEM — K92.1 GASTROINTESTINAL HEMORRHAGE WITH MELENA: Status: RESOLVED | Noted: 2017-05-12 | Resolved: 2020-02-10

## 2020-02-10 PROBLEM — R07.9 CHEST PAIN, UNSPECIFIED: Status: RESOLVED | Noted: 2019-12-11 | Resolved: 2020-02-10

## 2020-02-10 PROBLEM — I25.2 H/O ACUTE MYOCARDIAL INFARCTION: Status: RESOLVED | Noted: 2017-05-12 | Resolved: 2020-02-10

## 2020-02-10 PROBLEM — R55 NEAR SYNCOPE: Status: RESOLVED | Noted: 2017-06-07 | Resolved: 2020-02-10

## 2020-02-10 PROBLEM — R07.9 CHEST PAIN: Status: RESOLVED | Noted: 2019-12-12 | Resolved: 2020-02-10

## 2020-02-10 PROBLEM — I21.9 ACUTE MI (HCC): Status: RESOLVED | Noted: 2017-04-17 | Resolved: 2020-02-10

## 2020-02-10 PROBLEM — R79.89 ELEVATED BRAIN NATRIURETIC PEPTIDE (BNP) LEVEL: Status: RESOLVED | Noted: 2019-12-11 | Resolved: 2020-02-10

## 2020-02-10 PROBLEM — D62 ACUTE BLOOD LOSS ANEMIA: Status: RESOLVED | Noted: 2017-05-14 | Resolved: 2020-02-10

## 2020-02-10 PROCEDURE — G8427 DOCREV CUR MEDS BY ELIG CLIN: HCPCS | Performed by: FAMILY MEDICINE

## 2020-02-10 PROCEDURE — 4040F PNEUMOC VAC/ADMIN/RCVD: CPT | Performed by: FAMILY MEDICINE

## 2020-02-10 PROCEDURE — 1036F TOBACCO NON-USER: CPT | Performed by: FAMILY MEDICINE

## 2020-02-10 PROCEDURE — G8417 CALC BMI ABV UP PARAM F/U: HCPCS | Performed by: FAMILY MEDICINE

## 2020-02-10 PROCEDURE — 99214 OFFICE O/P EST MOD 30 MIN: CPT | Performed by: FAMILY MEDICINE

## 2020-02-10 PROCEDURE — 1123F ACP DISCUSS/DSCN MKR DOCD: CPT | Performed by: FAMILY MEDICINE

## 2020-02-10 PROCEDURE — G8482 FLU IMMUNIZE ORDER/ADMIN: HCPCS | Performed by: FAMILY MEDICINE

## 2020-02-10 RX ORDER — GABAPENTIN 100 MG/1
100 CAPSULE ORAL
Qty: 90 CAPSULE | Refills: 0 | Status: CANCELLED | OUTPATIENT
Start: 2020-02-10 | End: 2020-05-10

## 2020-02-10 NOTE — PROGRESS NOTES
OFFICE PROGRESS NOTE      SUBJECTIVE:        Patient ID:   Maria R Dominguez is a 80 y.o. male who presents for   Chief Complaint   Patient presents with    Diabetes     x1 month check up     Hypertension     check up    [facility] Maintenance     Due for Awv           HPI:     FEELS GOOD. WATCHING DIET GOOD. WALKING SOME FOR EXERCISE. TAKING MEDICATIONS REGULARLY. DOES NOT WANT TO GO OFF METFORMIN ALTHOUGH A1C IS IN THE NORMAL RANGE. IMPLICATIONS UNDERSTANDS. Prior to Admission medications    Medication Sig Start Date End Date Taking? Authorizing Provider   vitamin B-12 (CYANOCOBALAMIN) 1000 MCG tablet Take 1,000 mcg by mouth daily   Yes Historical Provider, MD   gabapentin (NEURONTIN) 100 MG capsule Take 1 capsule by mouth daily (with breakfast) for 90 days. 1/7/20 4/6/20 Yes Ricardo Gay MD   metFORMIN (GLUCOPHAGE-XR) 500 MG extended release tablet TAKE 1 TABLET BY MOUTH ONCE DAILY WITH BREAKFAST 1/7/20  Yes Ricardo Gay MD   nitroGLYCERIN (NITROSTAT) 0.4 MG SL tablet nitroglycerin 0.4 mg sublingual tablet   Place 1 tablet by sublingual route.  12/14/19  Yes Tori Valentine MD   isosorbide mononitrate (IMDUR) 60 MG extended release tablet Take 1 tablet by mouth daily 12/15/19  Yes Tori Valentine MD   diltiazem (CARDIZEM CD) 120 MG extended release capsule Take 1 capsule by mouth daily 12/15/19  Yes Tori Valentine MD   metoprolol succinate (TOPROL XL) 100 MG extended release tablet Take 1 tablet by mouth daily 7/3/19  Yes Ricardo Gay MD   clopidogrel (PLAVIX) 75 MG tablet Take 1 tablet by mouth daily 7/3/19 2/10/20 Yes Ricardo Gay MD   levothyroxine (SYNTHROID) 175 MCG tablet Take 1 tablet by mouth Daily 7/3/19 2/10/20 Yes Ricardo Gay MD   atorvastatin (LIPITOR) 40 MG tablet Take 1 tablet by mouth nightly 7/3/19  Yes Ricardo Gay MD   acetaminophen (TYLENOL) 325 MG tablet Take 650 mg by mouth every morning (before breakfast)   Yes Historical Provider, blood per rectum. Appetite is good  Genitourinary: no urinary urgency, frequency, dysuria, nocturia, hesitancy, or incontinence  Musculoskeletal: joint pains off and on. Morning stiffness. Ambulating well  Neurologic: no paralysis, paresis, paresthesia, seizures, tremors, or headaches  Hematologic/Lymphatic/Immunologic: no anemia, abnormal bleeding/bruising, fever, chills, night sweats, swollen glands, or unexplained weight loss  Endocrine: no heat or cold intolerance and no polyphagia, polydipsia, or polyuria        OBJECTIVE:     VS:  Wt Readings from Last 3 Encounters:   02/10/20 199 lb 11.2 oz (90.6 kg)   01/31/20 200 lb (90.7 kg)   09/28/18 201 lb (91.2 kg)     Temp Readings from Last 3 Encounters:   01/07/20 96.7 °F (35.9 °C) (Temporal)   12/14/19 97.8 °F (36.6 °C) (Oral)   02/26/19 97.8 °F (36.6 °C) (Temporal)     BP Readings from Last 3 Encounters:   02/10/20 118/62   01/31/20 (!) 144/78   09/28/18 (!) 142/70        General appearance: Alert, Awake, Oriented times 3, no distress  Skin: Warm and dry  Head: Normocephalic. No masses, lesions or tenderness noted  Eyes: Conjunctivae appear normal. PERLE  Ears: External ears normal  Nose/Sinuses: Nares normal. Septum midline. Mucosa normal. No drainage  Oropharynx: Oropharynx clear with no exudate seen  Neck: Neck supple. No jugular venous distension, lymphadenopathy or thyromegaly Trachea midline  Chest:  Normal. Movements are Normal and Equal.  Lungs: Lungs clear to auscultation bilaterally. No ronchi, crackles or wheezes  Heart: S1 S2  Regular rate and rhythm. No rub, murmur or gallop  Abdomen: Abdomen soft, non-tender. BS normal. No masses, organomegaly. Back: Grossly Normal and Equal. DTR are Normal. SLR is Normal.  Extremities: Arthritic changes are noted. Movements are limited. Pedal pulses are normal.  Musculoskeletal: Muscular strength appears intact.  No joint effusion, tenderness, swelling or warmth  Neuro:  No focal motor or sensory

## 2020-05-21 RX ORDER — GABAPENTIN 100 MG/1
100 CAPSULE ORAL
Qty: 30 CAPSULE | Refills: 0 | Status: SHIPPED
Start: 2020-05-21 | End: 2020-06-18 | Stop reason: SDUPTHER

## 2020-06-18 RX ORDER — LEVOTHYROXINE SODIUM 175 UG/1
175 TABLET ORAL DAILY
Qty: 30 TABLET | Refills: 0 | Status: SHIPPED
Start: 2020-06-18 | End: 2020-07-23 | Stop reason: SDUPTHER

## 2020-06-18 RX ORDER — METOPROLOL SUCCINATE 100 MG/1
100 TABLET, EXTENDED RELEASE ORAL DAILY
Qty: 30 TABLET | Refills: 0 | Status: SHIPPED | OUTPATIENT
Start: 2020-06-18 | End: 2020-08-11 | Stop reason: SDUPTHER

## 2020-06-18 RX ORDER — GABAPENTIN 100 MG/1
100 CAPSULE ORAL
Qty: 90 CAPSULE | Refills: 0 | Status: SHIPPED | OUTPATIENT
Start: 2020-06-18 | End: 2020-08-11 | Stop reason: SDUPTHER

## 2020-06-18 RX ORDER — ATORVASTATIN CALCIUM 40 MG/1
40 TABLET, FILM COATED ORAL NIGHTLY
Qty: 30 TABLET | Refills: 0 | Status: SHIPPED
Start: 2020-06-18 | End: 2020-07-23 | Stop reason: SDUPTHER

## 2020-06-26 ENCOUNTER — TELEPHONE (OUTPATIENT)
Dept: FAMILY MEDICINE CLINIC | Age: 85
End: 2020-06-26

## 2020-06-26 NOTE — TELEPHONE ENCOUNTER
Pt needs new script for regular metformin sent to [facility] in [place], Can not get XL because of recall of metformin

## 2020-07-10 ENCOUNTER — TELEPHONE (OUTPATIENT)
Dept: FAMILY MEDICINE CLINIC | Age: 85
End: 2020-07-10

## 2020-07-10 NOTE — TELEPHONE ENCOUNTER
Spoke to [facility] to schedule [facility]V. He would like to wait a few more months before coming in for any visit.

## 2020-07-23 RX ORDER — LEVOTHYROXINE SODIUM 175 UG/1
175 TABLET ORAL DAILY
Qty: 30 TABLET | Refills: 0 | Status: SHIPPED | OUTPATIENT
Start: 2020-07-23 | End: 2020-08-11 | Stop reason: SDUPTHER

## 2020-07-23 RX ORDER — CLOPIDOGREL BISULFATE 75 MG/1
75 TABLET ORAL DAILY
Qty: 30 TABLET | Refills: 0 | Status: SHIPPED | OUTPATIENT
Start: 2020-07-23 | End: 2020-08-11 | Stop reason: SDUPTHER

## 2020-07-23 RX ORDER — ATORVASTATIN CALCIUM 40 MG/1
40 TABLET, FILM COATED ORAL NIGHTLY
Qty: 30 TABLET | Refills: 0 | Status: SHIPPED | OUTPATIENT
Start: 2020-07-23 | End: 2020-08-11 | Stop reason: SDUPTHER

## 2020-08-04 RX ORDER — METOPROLOL SUCCINATE 100 MG/1
TABLET, EXTENDED RELEASE ORAL
Qty: 30 TABLET | Refills: 0 | OUTPATIENT
Start: 2020-08-04

## 2020-08-11 ENCOUNTER — OFFICE VISIT (OUTPATIENT)
Dept: FAMILY MEDICINE CLINIC | Age: 85
End: 2020-08-11
Payer: MEDICARE

## 2020-08-11 VITALS
TEMPERATURE: 98.2 F | RESPIRATION RATE: 18 BRPM | SYSTOLIC BLOOD PRESSURE: 132 MMHG | OXYGEN SATURATION: 95 % | HEART RATE: 94 BPM | DIASTOLIC BLOOD PRESSURE: 70 MMHG | BODY MASS INDEX: 29.51 KG/M2 | HEIGHT: 68 IN | WEIGHT: 194.7 LBS

## 2020-08-11 PROBLEM — N18.4 CKD (CHRONIC KIDNEY DISEASE) STAGE 4, GFR 15-29 ML/MIN (HCC): Status: ACTIVE | Noted: 2020-08-11

## 2020-08-11 PROCEDURE — 1036F TOBACCO NON-USER: CPT | Performed by: FAMILY MEDICINE

## 2020-08-11 PROCEDURE — 4040F PNEUMOC VAC/ADMIN/RCVD: CPT | Performed by: FAMILY MEDICINE

## 2020-08-11 PROCEDURE — 1123F ACP DISCUSS/DSCN MKR DOCD: CPT | Performed by: FAMILY MEDICINE

## 2020-08-11 PROCEDURE — G8417 CALC BMI ABV UP PARAM F/U: HCPCS | Performed by: FAMILY MEDICINE

## 2020-08-11 PROCEDURE — G8427 DOCREV CUR MEDS BY ELIG CLIN: HCPCS | Performed by: FAMILY MEDICINE

## 2020-08-11 RX ORDER — METOPROLOL SUCCINATE 100 MG/1
100 TABLET, EXTENDED RELEASE ORAL DAILY
Qty: 90 TABLET | Refills: 0 | Status: SHIPPED
Start: 2020-08-11 | End: 2020-01-01

## 2020-08-11 RX ORDER — CLOPIDOGREL BISULFATE 75 MG/1
75 TABLET ORAL DAILY
Qty: 90 TABLET | Refills: 0 | Status: SHIPPED
Start: 2020-08-11 | End: 2020-01-01 | Stop reason: SDUPTHER

## 2020-08-11 RX ORDER — GABAPENTIN 100 MG/1
100 CAPSULE ORAL
Qty: 90 CAPSULE | Refills: 0 | Status: SHIPPED
Start: 2020-08-11 | End: 2020-01-01 | Stop reason: SDUPTHER

## 2020-08-11 RX ORDER — ISOSORBIDE MONONITRATE 60 MG/1
60 TABLET, EXTENDED RELEASE ORAL DAILY
Qty: 90 TABLET | Refills: 3 | Status: SHIPPED
Start: 2020-08-11 | End: 2021-01-01

## 2020-08-11 RX ORDER — LEVOTHYROXINE SODIUM 175 UG/1
175 TABLET ORAL DAILY
Qty: 90 TABLET | Refills: 0 | Status: SHIPPED
Start: 2020-08-11 | End: 2020-01-01 | Stop reason: SDUPTHER

## 2020-08-11 RX ORDER — DILTIAZEM HYDROCHLORIDE 120 MG/1
120 CAPSULE, COATED, EXTENDED RELEASE ORAL DAILY
Qty: 90 CAPSULE | Refills: 3 | Status: SHIPPED
Start: 2020-08-11 | End: 2021-01-01 | Stop reason: SDUPTHER

## 2020-08-11 RX ORDER — PNV NO.95/FERROUS FUM/FOLIC AC 28MG-0.8MG
TABLET ORAL
COMMUNITY

## 2020-08-11 RX ORDER — NITROGLYCERIN 0.4 MG/1
TABLET SUBLINGUAL
Qty: 25 TABLET | Refills: 1 | Status: CANCELLED | OUTPATIENT
Start: 2020-08-11

## 2020-08-11 RX ORDER — ATORVASTATIN CALCIUM 40 MG/1
40 TABLET, FILM COATED ORAL NIGHTLY
Qty: 90 TABLET | Refills: 0 | Status: SHIPPED
Start: 2020-08-11 | End: 2020-01-01 | Stop reason: SDUPTHER

## 2020-08-11 NOTE — PATIENT INSTRUCTIONS
LOW SALT,LOW CARB. AND LOW FAT DIET. CONTINUE CURRENT MEDICATIONS TAKING REGULARLY. REGULAR WALKING ADVISED. FASTING FOR LAB WORK ONE MORNING. NEXT APPOINTMENT IN 3 MONTHS.

## 2020-08-11 NOTE — PROGRESS NOTES
OFFICE PROGRESS NOTE      SUBJECTIVE:        Patient ID:   Je Laughlin is a 80 y.o. male who presents for   Chief Complaint   Patient presents with    Medication Refill           HPI:     FEELS GOOD. WATCHING DIET GOOD. WALKING FOR EXERCISE. TAKING MEDICATIONS REGULARLY. Prior to Admission medications    Medication Sig Start Date End Date Taking? Authorizing Provider   Ferrous Sulfate (IRON) 325 (65 Fe) MG TABS Take by mouth   Yes Historical Provider, MD   Cholecalciferol (VITAMIN D3) 125 MCG (5000 UT) TABS Take by mouth   Yes Historical Provider, MD   atorvastatin (LIPITOR) 40 MG tablet Take 1 tablet by mouth nightly 8/11/20  Yes Atul Poe MD   clopidogrel (PLAVIX) 75 MG tablet Take 1 tablet by mouth daily 8/11/20 9/10/20 Yes Atul Poe MD   dilTIAZem (CARDIZEM CD) 120 MG extended release capsule Take 1 capsule by mouth daily 8/11/20  Yes Atul Poe MD   gabapentin (NEURONTIN) 100 MG capsule Take 1 capsule by mouth daily (with breakfast) for 90 days. 8/11/20 11/9/20 Yes Atul Poe MD   isosorbide mononitrate (IMDUR) 60 MG extended release tablet Take 1 tablet by mouth daily 8/11/20  Yes Atul Poe MD   levothyroxine (SYNTHROID) 175 MCG tablet Take 1 tablet by mouth Daily 8/11/20 9/10/20 Yes Atul Poe MD   metFORMIN (GLUCOPHAGE) 500 MG tablet Take 1 tablet by mouth daily (with breakfast) 8/11/20  Yes Atul Poe MD   metoprolol succinate (TOPROL XL) 100 MG extended release tablet Take 1 tablet by mouth daily 8/11/20  Yes Atul Poe MD   vitamin B-12 (CYANOCOBALAMIN) 1000 MCG tablet Take 1,000 mcg by mouth daily   Yes Historical Provider, MD   nitroGLYCERIN (NITROSTAT) 0.4 MG SL tablet nitroglycerin 0.4 mg sublingual tablet   Place 1 tablet by sublingual route.  12/14/19  Yes Henry Quezada MD   acetaminophen (TYLENOL) 325 MG tablet Take 650 mg by mouth every morning (before breakfast)   Yes Historical Provider, MD     Social History     Socioeconomic History    Marital status:       Spouse name: None    Number of children: None    Years of education: None    Highest education level: None   Occupational History    None   Social Needs    Financial resource strain: None    Food insecurity     Worry: None     Inability: None    Transportation needs     Medical: None     Non-medical: None   Tobacco Use    Smoking status: Former Smoker     Last attempt to quit: 1950     Years since quittin.6    Smokeless tobacco: Never Used   Substance and Sexual Activity    Alcohol use: No     Comment: decafe    Drug use: No    Sexual activity: Never   Lifestyle    Physical activity     Days per week: None     Minutes per session: None    Stress: None   Relationships    Social connections     Talks on phone: None     Gets together: None     Attends Confucianism service: None     Active member of club or organization: None     Attends meetings of clubs or organizations: None     Relationship status: None    Intimate partner violence     Fear of current or ex partner: None     Emotionally abused: None     Physically abused: None     Forced sexual activity: None   Other Topics Concern    None   Social History Narrative    None       I have reviewed Cj's allergies, medications, problem list, medical, social and family history and have updated as needed in the electronic medical record  Review Of Systems:    Skin: no abnormal pigmentation, rash, scaling, itching, masses, hair or nail changes  Eyes: no blurring, diplopia, or eye pain  Ears/Nose/Throat: no hearing loss, tinnitus, vertigo, nosebleed, nasal congestion, rhinorrhea, sore throat  Respiratory: no cough, pleuritic chest pain, dyspnea, or wheezing  Cardiovascular: no chest pain, angina, dyspnea on exertion, orthopnea, PND, palpitations, or claudication  Gastrointestinal: no nausea, vomiting, heartburn, diarrhea, constipation, bloating,  abdominal pain, or blood per rectum. Appetite is good  Genitourinary: no urinary urgency, frequency, dysuria, nocturia, hesitancy, or incontinence  Musculoskeletal: joint pains off and on. Morning stiffness. Ambulating well  Neurologic: no paralysis, paresis, paresthesia, seizures, tremors, or headaches  Hematologic/Lymphatic/Immunologic: no anemia, abnormal bleeding/bruising, fever, chills, night sweats, swollen glands, or unexplained weight loss  Endocrine: no heat or cold intolerance and no polyphagia, polydipsia, or polyuria        OBJECTIVE:     VS:  Wt Readings from Last 3 Encounters:   08/11/20 194 lb 11.2 oz (88.3 kg)   02/10/20 199 lb 11.2 oz (90.6 kg)   01/31/20 200 lb (90.7 kg)     Temp Readings from Last 3 Encounters:   08/11/20 98.2 °F (36.8 °C)   01/07/20 96.7 °F (35.9 °C) (Temporal)   12/14/19 97.8 °F (36.6 °C) (Oral)     BP Readings from Last 3 Encounters:   08/11/20 132/70   02/10/20 118/62   01/31/20 (!) 144/78        General appearance: Alert, Awake, Oriented times 3, no distress  Skin: Warm and dry  Head: Normocephalic. No masses, lesions or tenderness noted  Eyes: Conjunctivae appear normal. PERLE  Ears: External ears normal  Nose/Sinuses: Nares normal. Septum midline. Mucosa normal. No drainage  Oropharynx: Oropharynx clear with no exudate seen  Neck: Neck supple. No jugular venous distension, lymphadenopathy or thyromegaly Trachea midline  Chest:  Normal. Movements are Normal and Equal.  Lungs: Lungs clear to auscultation bilaterally. No ronchi, crackles or wheezes  Heart: S1 S2  Regular rate and rhythm. No rub, murmur or gallop  Abdomen: Abdomen soft, non-tender. BS normal. No masses, organomegaly. Back: Grossly Normal and Equal. DTR are Normal. SLR is Normal.  Extremities: Arthritic changes are noted. Movements are limited. Pedal pulses are normal.  Musculoskeletal: Muscular strength appears intact.  No joint effusion, tenderness, swelling or warmth  Neuro:  No focal motor or sensory deficits        ASSESSMENT Patient Active Problem List    Diagnosis Date Noted    Acquired hypothyroidism      Priority: High     Class: Chronic    Essential hypertension      Priority: High     Class: Chronic    GERD (gastroesophageal reflux disease)      Priority: Low    Type 2 diabetes mellitus with kidney complication, without long-term current use of insulin ([facility])      Priority: Low    Colitis      Priority: Low    CKD (chronic kidney disease) stage 4, GFR 15-29 ml/min ([facility]) 08/11/2020    Atrial fibrillation with RVR ([facility] [address] 12/11/2019        Diagnosis:     ICD-10-CM    1. Type 2 diabetes mellitus with stage 2 chronic kidney disease, without long-term current use of insulin ([facility])  E11.22 Comprehensive Metabolic Panel    N70.7 Lipid Panel   2. CKD (chronic kidney disease) stage 4, GFR 15-29 ml/min ([facility])  N18.4 CBC Auto Differential   3. Atrial fibrillation with RVR ([facility])  I48.91 CBC Auto Differential   4. Acquired hypothyroidism  E03.9 Lipid Panel     TSH without Reflex     T4   5. Essential hypertension  I10 Lipid Panel       PLAN:           Patient Instructions   LOW SALT,LOW CARB. AND LOW FAT DIET. CONTINUE CURRENT MEDICATIONS TAKING REGULARLY. REGULAR WALKING ADVISED. FASTING FOR LAB WORK ONE MORNING. NEXT APPOINTMENT IN 3 MONTHS. Return in about 3 months (around 11/11/2020) for FOLLOW UP VISIT. I have reviewed my findings and recommendations with Matthew Major.     Electronically signed by Radha Arreola MD on 8/11/20 at 4:36 PM EDT

## 2020-11-12 NOTE — PROGRESS NOTES
OFFICE PROGRESS NOTE      SUBJECTIVE:        Patient ID:   Adonay Correa is a 80 y.o. male who presents for   Chief Complaint   Patient presents with    Diabetes     3 month check up    Fall     x patient fall out of bed monday and patient eye is black blue and red in color on RT side           HPI:     FELL OUT OF BED 2 DAYS AGO. HAS BLACK ABD BLUE MARKS AROUND THE RIGHT EYE. NO VISION PROBLEM OR ANY OTHER ASSOCIATED PROBLEM. DID NOT GO TO ER FOR EVALUATION AND DOES NOT WANT ANY TESTING DONE.  DOES NOT WANT RAIL GUARD FOR HIS BED AS THIS WAS HIS FIRST EPISODE IN LIFE. UNDERSTANDS THE RISK. WATCHING DIET GOOD. WALKING IN HOUSE FOR EXERCISE. TAKING MEDICATIONS REGULARLY. Prior to Admission medications    Medication Sig Start Date End Date Taking? Authorizing Provider   clopidogrel (PLAVIX) 75 MG tablet Take 1 tablet by mouth daily 11/12/20 12/12/20 Yes Rigo Mcintyre MD   atorvastatin (LIPITOR) 40 MG tablet Take 1 tablet by mouth nightly 11/12/20  Yes Rigo Mcintyre MD   metFORMIN (GLUCOPHAGE) 500 MG tablet Take 1 tablet by mouth daily (with breakfast) 11/12/20  Yes Rigo Mcintyre MD   metoprolol succinate (TOPROL XL) 100 MG extended release tablet Take 1 tablet by mouth daily 11/12/20  Yes Rigo Mcintyre MD   levothyroxine (SYNTHROID) 175 MCG tablet Take 1 tablet by mouth Daily 11/12/20 12/12/20 Yes Rigo Mcintyre MD   gabapentin (NEURONTIN) 100 MG capsule Take 1 capsule by mouth daily (with breakfast) for 90 days.  11/12/20 2/10/21 Yes Rigo Mcintyre MD   Ferrous Sulfate (IRON) 325 (65 Fe) MG TABS Take by mouth   Yes Historical Provider, MD   Cholecalciferol (VITAMIN D3) 125 MCG (5000 UT) TABS Take by mouth   Yes Historical Provider, MD   dilTIAZem (CARDIZEM CD) 120 MG extended release capsule Take 1 capsule by mouth daily 8/11/20  Yes Rigo Mcintyre MD   isosorbide mononitrate (IMDUR) 60 MG extended release tablet Take 1 tablet by mouth daily 8/11/20  Yes Deni Clemente MD   vitamin B-12 (CYANOCOBALAMIN) 1000 MCG tablet Take 1,000 mcg by mouth daily   Yes Historical Provider, MD   nitroGLYCERIN (NITROSTAT) 0.4 MG SL tablet nitroglycerin 0.4 mg sublingual tablet   Place 1 tablet by sublingual route. 19  Yes Tiffani Pinto MD   acetaminophen (TYLENOL) 325 MG tablet Take 650 mg by mouth every morning (before breakfast)   Yes Historical Provider, MD     Social History     Socioeconomic History    Marital status:       Spouse name: None    Number of children: None    Years of education: None    Highest education level: None   Occupational History    None   Social Needs    Financial resource strain: None    Food insecurity     Worry: None     Inability: None    Transportation needs     Medical: None     Non-medical: None   Tobacco Use    Smoking status: Former Smoker     Last attempt to quit: 1950     Years since quittin.9    Smokeless tobacco: Never Used   Substance and Sexual Activity    Alcohol use: No     Comment: decafe    Drug use: No    Sexual activity: Never   Lifestyle    Physical activity     Days per week: None     Minutes per session: None    Stress: None   Relationships    Social connections     Talks on phone: None     Gets together: None     Attends Anglican service: None     Active member of club or organization: None     Attends meetings of clubs or organizations: None     Relationship status: None    Intimate partner violence     Fear of current or ex partner: None     Emotionally abused: None     Physically abused: None     Forced sexual activity: None   Other Topics Concern    None   Social History Narrative    None       I have reviewed Cj's allergies, medications, problem list, medical, social and family history and have updated as needed in the electronic medical record  Review Of Systems:    Skin: no abnormal pigmentation, rash, scaling, itching, masses, hair or nail changes  Eyes: no blurring, diplopia, or assessment and plan is as follows: home safety tips provided, [place] Exercise Intervention resources provided. Return in about 2 months (around 1/12/2021) for FOLLOW UP VISIT. I have reviewed my findings and recommendations with [address] [employer]. Amanda Yepez     Electronically signed by Rigo Mcintyre MD on 11/12/20 at 1:49 PM EST

## 2020-11-12 NOTE — PATIENT INSTRUCTIONS
LOW SALT,LOW CARB. AND LOW FAT DIET. CONTINUE CURRENT MEDICATIONS TAKING REGULARLY. USE HEATING PAD 15 MINUTES 2 TIMES A DAY AT THE AFFECTED AREA. REGULAR WALKING IN HOUSE ADVISED. NEXT APPOINTMENT IN 2 MONTHS.

## 2020-12-08 NOTE — PATIENT INSTRUCTIONS
LOW SALT,LOW CARB. AND LOW FAT DIET. CONTINUE CURRENT MEDICATIONS TAKING REGULARLY. TAKE REMERON 15 MG. AROUND 9;00 PM.  REGULAR WALKING ADVISED. ADVISED WEIGHT REDUCTION. NEXT APPOINTMENT IN 1 MONTH.

## 2021-01-01 ENCOUNTER — CARE COORDINATION (OUTPATIENT)
Dept: CASE MANAGEMENT | Age: 86
End: 2021-01-01

## 2021-01-01 ENCOUNTER — APPOINTMENT (OUTPATIENT)
Dept: GENERAL RADIOLOGY | Age: 86
End: 2021-01-01
Payer: MEDICARE

## 2021-01-01 ENCOUNTER — HOSPITAL ENCOUNTER (OUTPATIENT)
Age: 86
Discharge: HOME | End: 2021-06-11
Payer: MEDICARE

## 2021-01-01 ENCOUNTER — APPOINTMENT (OUTPATIENT)
Dept: ULTRASOUND IMAGING | Age: 86
End: 2021-01-01
Payer: MEDICARE

## 2021-01-01 ENCOUNTER — OFFICE VISIT (OUTPATIENT)
Dept: FAMILY MEDICINE CLINIC | Age: 86
End: 2021-01-01
Payer: MEDICARE

## 2021-01-01 ENCOUNTER — TELEPHONE (OUTPATIENT)
Dept: FAMILY MEDICINE CLINIC | Age: 86
End: 2021-01-01

## 2021-01-01 ENCOUNTER — HOSPITAL ENCOUNTER (OUTPATIENT)
Age: 86
Discharge: HOME | End: 2021-01-29
Payer: MEDICARE

## 2021-01-01 ENCOUNTER — APPOINTMENT (OUTPATIENT)
Dept: CT IMAGING | Age: 86
DRG: 065 | End: 2021-01-01
Payer: MEDICARE

## 2021-01-01 ENCOUNTER — HOSPITAL ENCOUNTER (OUTPATIENT)
Dept: MRI IMAGING | Age: 86
Discharge: HOME | End: 2021-09-18
Payer: MEDICARE

## 2021-01-01 ENCOUNTER — CARE COORDINATION (OUTPATIENT)
Dept: CARE COORDINATION | Age: 86
End: 2021-01-01

## 2021-01-01 ENCOUNTER — APPOINTMENT (OUTPATIENT)
Dept: GENERAL RADIOLOGY | Age: 86
DRG: 065 | End: 2021-01-01
Payer: MEDICARE

## 2021-01-01 ENCOUNTER — TELEPHONE (OUTPATIENT)
Dept: PHARMACY | Facility: CLINIC | Age: 86
End: 2021-01-01

## 2021-01-01 ENCOUNTER — HOSPITAL ENCOUNTER (OUTPATIENT)
Age: 86
Discharge: HOME | End: 2021-07-13
Payer: MEDICARE

## 2021-01-01 ENCOUNTER — HOSPITAL ENCOUNTER (EMERGENCY)
Age: 86
Discharge: HOME | End: 2021-05-03
Attending: EMERGENCY MEDICINE
Payer: MEDICARE

## 2021-01-01 ENCOUNTER — SCHEDULED TELEPHONE ENCOUNTER (OUTPATIENT)
Dept: PHARMACY | Facility: CLINIC | Age: 86
End: 2021-01-01

## 2021-01-01 ENCOUNTER — HOSPITAL ENCOUNTER (INPATIENT)
Age: 86
LOS: 1 days | Discharge: HOME | DRG: 281 | End: 2021-05-30
Attending: EMERGENCY MEDICINE | Admitting: INTERNAL MEDICINE
Payer: MEDICARE

## 2021-01-01 ENCOUNTER — APPOINTMENT (OUTPATIENT)
Dept: GENERAL RADIOLOGY | Age: 86
DRG: 281 | End: 2021-01-01
Payer: MEDICARE

## 2021-01-01 ENCOUNTER — HOSPITAL ENCOUNTER (INPATIENT)
Age: 86
LOS: 6 days | Discharge: NURSING, REHAB OR LONG-TERM CARE | DRG: 065 | End: 2021-07-29
Attending: EMERGENCY MEDICINE | Admitting: FAMILY MEDICINE
Payer: MEDICARE

## 2021-01-01 VITALS
SYSTOLIC BLOOD PRESSURE: 132 MMHG | BODY MASS INDEX: 29.1 KG/M2 | DIASTOLIC BLOOD PRESSURE: 62 MMHG | RESPIRATION RATE: 16 BRPM | WEIGHT: 192 LBS | HEIGHT: 68 IN | OXYGEN SATURATION: 93 % | TEMPERATURE: 96.9 F | HEART RATE: 76 BPM

## 2021-01-01 VITALS
HEART RATE: 70 BPM | SYSTOLIC BLOOD PRESSURE: 122 MMHG | WEIGHT: 193 LBS | OXYGEN SATURATION: 90 % | HEIGHT: 68 IN | RESPIRATION RATE: 16 BRPM | BODY MASS INDEX: 29.25 KG/M2 | TEMPERATURE: 96.8 F | DIASTOLIC BLOOD PRESSURE: 52 MMHG

## 2021-01-01 VITALS
RESPIRATION RATE: 16 BRPM | HEART RATE: 62 BPM | DIASTOLIC BLOOD PRESSURE: 50 MMHG | WEIGHT: 190 LBS | SYSTOLIC BLOOD PRESSURE: 110 MMHG | HEIGHT: 68 IN | BODY MASS INDEX: 28.79 KG/M2 | OXYGEN SATURATION: 92 % | TEMPERATURE: 96.6 F

## 2021-01-01 VITALS
BODY MASS INDEX: 28.79 KG/M2 | WEIGHT: 190 LBS | HEIGHT: 68 IN | SYSTOLIC BLOOD PRESSURE: 129 MMHG | TEMPERATURE: 98 F | HEART RATE: 86 BPM | RESPIRATION RATE: 20 BRPM | OXYGEN SATURATION: 93 % | DIASTOLIC BLOOD PRESSURE: 67 MMHG

## 2021-01-01 VITALS
WEIGHT: 192.7 LBS | DIASTOLIC BLOOD PRESSURE: 82 MMHG | OXYGEN SATURATION: 95 % | BODY MASS INDEX: 29.21 KG/M2 | HEART RATE: 79 BPM | TEMPERATURE: 98.9 F | RESPIRATION RATE: 18 BRPM | HEIGHT: 68 IN | SYSTOLIC BLOOD PRESSURE: 181 MMHG

## 2021-01-01 VITALS
WEIGHT: 196.9 LBS | BODY MASS INDEX: 29.84 KG/M2 | DIASTOLIC BLOOD PRESSURE: 61 MMHG | RESPIRATION RATE: 18 BRPM | HEIGHT: 68 IN | HEART RATE: 83 BPM | SYSTOLIC BLOOD PRESSURE: 127 MMHG | TEMPERATURE: 99.7 F | OXYGEN SATURATION: 95 %

## 2021-01-01 DIAGNOSIS — E03.9 ACQUIRED HYPOTHYROIDISM: ICD-10-CM

## 2021-01-01 DIAGNOSIS — I63.9 IMPENDING CEREBROVASCULAR ACCIDENT (HCC): ICD-10-CM

## 2021-01-01 DIAGNOSIS — I48.91 ATRIAL FIBRILLATION WITH RAPID VENTRICULAR RESPONSE (HCC): ICD-10-CM

## 2021-01-01 DIAGNOSIS — E78.00 PRIMARY HYPERCHOLESTEROLEMIA: ICD-10-CM

## 2021-01-01 DIAGNOSIS — I63.9 CEREBROVASCULAR ACCIDENT (CVA), UNSPECIFIED MECHANISM (HCC): ICD-10-CM

## 2021-01-01 DIAGNOSIS — N18.4 CKD (CHRONIC KIDNEY DISEASE) STAGE 4, GFR 15-29 ML/MIN (HCC): ICD-10-CM

## 2021-01-01 DIAGNOSIS — Z74.09 IMPAIRED MOBILITY AND ACTIVITIES OF DAILY LIVING: ICD-10-CM

## 2021-01-01 DIAGNOSIS — R06.02 SHORTNESS OF BREATH AT REST: ICD-10-CM

## 2021-01-01 DIAGNOSIS — I10 ESSENTIAL HYPERTENSION: ICD-10-CM

## 2021-01-01 DIAGNOSIS — I21.4 NSTEMI (NON-ST ELEVATED MYOCARDIAL INFARCTION) (HCC): Primary | ICD-10-CM

## 2021-01-01 DIAGNOSIS — I48.91 ATRIAL FIBRILLATION WITH RVR (HCC): ICD-10-CM

## 2021-01-01 DIAGNOSIS — R55 SYNCOPE AND COLLAPSE: Primary | ICD-10-CM

## 2021-01-01 DIAGNOSIS — N18.9 CHRONIC RENAL IMPAIRMENT, UNSPECIFIED CKD STAGE: ICD-10-CM

## 2021-01-01 DIAGNOSIS — M10.9 ACUTE GOUT OF RIGHT WRIST, UNSPECIFIED CAUSE: ICD-10-CM

## 2021-01-01 DIAGNOSIS — I48.91 ATRIAL FIBRILLATION WITH RVR (HCC): Primary | ICD-10-CM

## 2021-01-01 DIAGNOSIS — R06.02 SHORTNESS OF BREATH AT REST: Primary | ICD-10-CM

## 2021-01-01 DIAGNOSIS — E87.5 HYPERKALEMIA: ICD-10-CM

## 2021-01-01 DIAGNOSIS — N18.31 TYPE 2 DIABETES MELLITUS WITH STAGE 3A CHRONIC KIDNEY DISEASE, WITHOUT LONG-TERM CURRENT USE OF INSULIN (HCC): Primary | ICD-10-CM

## 2021-01-01 DIAGNOSIS — Z78.9 IMPAIRED MOBILITY AND ACTIVITIES OF DAILY LIVING: ICD-10-CM

## 2021-01-01 DIAGNOSIS — N18.30 CHRONIC RENAL INSUFFICIENCY, STAGE 3 (MODERATE) (HCC): Primary | ICD-10-CM

## 2021-01-01 DIAGNOSIS — R60.0 BILATERAL LEG EDEMA: ICD-10-CM

## 2021-01-01 DIAGNOSIS — E11.22 TYPE 2 DIABETES MELLITUS WITH STAGE 3A CHRONIC KIDNEY DISEASE, WITHOUT LONG-TERM CURRENT USE OF INSULIN (HCC): Primary | ICD-10-CM

## 2021-01-01 LAB
ACANTHOCYTES: ABNORMAL
ACANTHOCYTES: ABNORMAL
ALBUMIN SERPL-MCNC: 3 G/DL (ref 3.5–5.2)
ALBUMIN SERPL-MCNC: 3.3 G/DL (ref 3.5–5.2)
ALBUMIN SERPL-MCNC: 3.7 G/DL (ref 3.5–5.2)
ALP BLD-CCNC: 113 U/L (ref 40–129)
ALP BLD-CCNC: 141 U/L (ref 40–129)
ALP BLD-CCNC: 150 U/L (ref 40–129)
ALT SERPL-CCNC: 11 U/L (ref 0–40)
ALT SERPL-CCNC: 5 U/L (ref 0–40)
ALT SERPL-CCNC: 6 U/L (ref 0–40)
ANION GAP SERPL CALCULATED.3IONS-SCNC: 10 MMOL/L (ref 7–16)
ANION GAP SERPL CALCULATED.3IONS-SCNC: 12 MMOL/L (ref 7–16)
ANION GAP SERPL CALCULATED.3IONS-SCNC: 13 MMOL/L (ref 7–16)
ANION GAP SERPL CALCULATED.3IONS-SCNC: 9 MMOL/L (ref 7–16)
ANISOCYTOSIS: ABNORMAL
APTT: 114.6 SEC (ref 24.5–35.1)
APTT: 24.5 SEC (ref 24.5–35.1)
APTT: 29.6 SEC (ref 24.5–35.1)
APTT: 51.6 SEC (ref 24.5–35.1)
APTT: 70.1 SEC (ref 24.5–35.1)
AST SERPL-CCNC: 10 U/L (ref 0–39)
AST SERPL-CCNC: 11 U/L (ref 0–39)
AST SERPL-CCNC: 32 U/L (ref 0–39)
B.E.: -0.7 MMOL/L (ref -3–3)
BASOPHILS ABSOLUTE: 0 E9/L (ref 0–0.2)
BASOPHILS ABSOLUTE: 0.06 E9/L (ref 0–0.2)
BASOPHILS ABSOLUTE: 0.07 E9/L (ref 0–0.2)
BASOPHILS ABSOLUTE: 0.11 E9/L (ref 0–0.2)
BASOPHILS RELATIVE PERCENT: 0.5 % (ref 0–2)
BASOPHILS RELATIVE PERCENT: 0.5 % (ref 0–2)
BASOPHILS RELATIVE PERCENT: 0.7 % (ref 0–2)
BASOPHILS RELATIVE PERCENT: 0.9 % (ref 0–2)
BILIRUB SERPL-MCNC: 0.3 MG/DL (ref 0–1.2)
BILIRUB SERPL-MCNC: 0.5 MG/DL (ref 0–1.2)
BILIRUB SERPL-MCNC: <0.2 MG/DL (ref 0–1.2)
BILIRUBIN URINE: NEGATIVE
BLOOD CULTURE, ROUTINE: NORMAL
BLOOD, URINE: NEGATIVE
BUN BLDV-MCNC: 19 MG/DL (ref 6–23)
BUN BLDV-MCNC: 20 MG/DL (ref 6–23)
BUN BLDV-MCNC: 40 MG/DL (ref 6–23)
BUN BLDV-MCNC: 56 MG/DL (ref 6–23)
BURR CELLS: ABNORMAL
BURR CELLS: ABNORMAL
CALCIUM SERPL-MCNC: 8.5 MG/DL (ref 8.6–10.2)
CALCIUM SERPL-MCNC: 8.7 MG/DL (ref 8.6–10.2)
CALCIUM SERPL-MCNC: 9.1 MG/DL (ref 8.6–10.2)
CALCIUM SERPL-MCNC: 9.2 MG/DL (ref 8.6–10.2)
CHLORIDE BLD-SCNC: 106 MMOL/L (ref 98–107)
CHLORIDE BLD-SCNC: 106 MMOL/L (ref 98–107)
CHLORIDE BLD-SCNC: 107 MMOL/L (ref 98–107)
CHLORIDE BLD-SCNC: 107 MMOL/L (ref 98–107)
CHOLESTEROL, TOTAL: 134 MG/DL (ref 0–199)
CHP ED QC CHECK: YES
CLARITY: CLEAR
CO2: 21 MMOL/L (ref 22–29)
CO2: 23 MMOL/L (ref 22–29)
CO2: 24 MMOL/L (ref 22–29)
CO2: 25 MMOL/L (ref 22–29)
COHB: 0.3 % (ref 0–1.5)
COLOR: YELLOW
CREAT SERPL-MCNC: 2.4 MG/DL (ref 0.7–1.2)
CREAT SERPL-MCNC: 2.5 MG/DL (ref 0.7–1.2)
CRITICAL: ABNORMAL
CULTURE, BLOOD 2: NORMAL
DATE ANALYZED: ABNORMAL
DATE OF COLLECTION: ABNORMAL
EKG ATRIAL RATE: 151 BPM
EKG ATRIAL RATE: 214 BPM
EKG ATRIAL RATE: 76 BPM
EKG ATRIAL RATE: 96 BPM
EKG P AXIS: 4 DEGREES
EKG P AXIS: 46 DEGREES
EKG P-R INTERVAL: 116 MS
EKG P-R INTERVAL: 132 MS
EKG P-R INTERVAL: 154 MS
EKG Q-T INTERVAL: 248 MS
EKG Q-T INTERVAL: 350 MS
EKG Q-T INTERVAL: 370 MS
EKG Q-T INTERVAL: 386 MS
EKG QRS DURATION: 74 MS
EKG QRS DURATION: 80 MS
EKG QRS DURATION: 82 MS
EKG QRS DURATION: 86 MS
EKG QTC CALCULATION (BAZETT): 393 MS
EKG QTC CALCULATION (BAZETT): 418 MS
EKG QTC CALCULATION (BAZETT): 434 MS
EKG QTC CALCULATION (BAZETT): 442 MS
EKG R AXIS: -19 DEGREES
EKG R AXIS: -20 DEGREES
EKG R AXIS: -20 DEGREES
EKG T AXIS: -15 DEGREES
EKG T AXIS: 19 DEGREES
EKG T AXIS: 68 DEGREES
EKG T AXIS: 84 DEGREES
EKG VENTRICULAR RATE: 151 BPM
EKG VENTRICULAR RATE: 76 BPM
EKG VENTRICULAR RATE: 77 BPM
EKG VENTRICULAR RATE: 96 BPM
EOSINOPHILS ABSOLUTE: 0 E9/L (ref 0.05–0.5)
EOSINOPHILS ABSOLUTE: 0.31 E9/L (ref 0.05–0.5)
EOSINOPHILS ABSOLUTE: 0.42 E9/L (ref 0.05–0.5)
EOSINOPHILS ABSOLUTE: 0.46 E9/L (ref 0.05–0.5)
EOSINOPHILS RELATIVE PERCENT: 0.8 % (ref 0–6)
EOSINOPHILS RELATIVE PERCENT: 2.6 % (ref 0–6)
EOSINOPHILS RELATIVE PERCENT: 4 % (ref 0–6)
EOSINOPHILS RELATIVE PERCENT: 4.1 % (ref 0–6)
FERRITIN: 54 NG/ML
FIO2: 21 %
GFR AFRICAN AMERICAN: 29
GFR AFRICAN AMERICAN: 31
GFR NON-AFRICAN AMERICAN: 24 ML/MIN/1.73
GFR NON-AFRICAN AMERICAN: 25 ML/MIN/1.73
GLUCOSE BLD-MCNC: 102 MG/DL
GLUCOSE BLD-MCNC: 103 MG/DL (ref 74–99)
GLUCOSE BLD-MCNC: 105 MG/DL (ref 74–99)
GLUCOSE BLD-MCNC: 107 MG/DL
GLUCOSE BLD-MCNC: 112 MG/DL (ref 74–99)
GLUCOSE BLD-MCNC: 126 MG/DL
GLUCOSE BLD-MCNC: 146 MG/DL (ref 74–99)
GLUCOSE URINE: NEGATIVE MG/DL
HBA1C MFR BLD: 5.7 % (ref 4–5.6)
HBA1C MFR BLD: 6 %
HBA1C MFR BLD: 6.5 % (ref 4–5.6)
HCO3: 24.3 MMOL/L (ref 22–26)
HCT VFR BLD CALC: 31.7 % (ref 37–54)
HCT VFR BLD CALC: 32.2 % (ref 37–54)
HCT VFR BLD CALC: 35.1 % (ref 37–54)
HCT VFR BLD CALC: 35.3 % (ref 37–54)
HCT VFR BLD CALC: 38.7 % (ref 37–54)
HCT VFR BLD CALC: 38.7 % (ref 37–54)
HCT VFR BLD CALC: 38.8 % (ref 37–54)
HDLC SERPL-MCNC: 36 MG/DL
HEMOGLOBIN: 10.9 G/DL (ref 12.5–16.5)
HEMOGLOBIN: 10.9 G/DL (ref 12.5–16.5)
HEMOGLOBIN: 11.5 G/DL (ref 12.5–16.5)
HEMOGLOBIN: 11.5 G/DL (ref 12.5–16.5)
HEMOGLOBIN: 11.9 G/DL (ref 12.5–16.5)
HEMOGLOBIN: 9.9 G/DL (ref 12.5–16.5)
HEMOGLOBIN: 9.9 G/DL (ref 12.5–16.5)
HHB: 8.8 % (ref 0–5)
HYPOCHROMIA: ABNORMAL
IMMATURE GRANULOCYTES #: 0.07 E9/L
IMMATURE GRANULOCYTES #: 0.11 E9/L
IMMATURE GRANULOCYTES %: 0.6 % (ref 0–5)
IMMATURE GRANULOCYTES %: 1.1 % (ref 0–5)
INR BLD: 1.1
INR BLD: 1.1
IRON SATURATION: 13 % (ref 20–55)
IRON SATURATION: 68 % (ref 20–55)
IRON: 155 MCG/DL (ref 59–158)
IRON: 33 MCG/DL (ref 59–158)
KETONES, URINE: NEGATIVE MG/DL
LAB: ABNORMAL
LACTIC ACID: 1.2 MMOL/L (ref 0.5–2.2)
LACTIC ACID: 1.7 MMOL/L (ref 0.5–2.2)
LDL CHOLESTEROL CALCULATED: 76 MG/DL (ref 0–99)
LEUKOCYTE ESTERASE, URINE: NEGATIVE
LV EF: 55 %
LVEF MODALITY: NORMAL
LYMPHOCYTES ABSOLUTE: 0.31 E9/L (ref 1.5–4)
LYMPHOCYTES ABSOLUTE: 1.67 E9/L (ref 1.5–4)
LYMPHOCYTES ABSOLUTE: 1.75 E9/L (ref 1.5–4)
LYMPHOCYTES ABSOLUTE: 2.3 E9/L (ref 1.5–4)
LYMPHOCYTES RELATIVE PERCENT: 1.7 % (ref 20–42)
LYMPHOCYTES RELATIVE PERCENT: 13.9 % (ref 20–42)
LYMPHOCYTES RELATIVE PERCENT: 16.7 % (ref 20–42)
LYMPHOCYTES RELATIVE PERCENT: 20.3 % (ref 20–42)
Lab: ABNORMAL
MCH RBC QN AUTO: 24.1 PG (ref 26–35)
MCH RBC QN AUTO: 24.1 PG (ref 26–35)
MCH RBC QN AUTO: 24.2 PG (ref 26–35)
MCH RBC QN AUTO: 24.3 PG (ref 26–35)
MCH RBC QN AUTO: 25 PG (ref 26–35)
MCH RBC QN AUTO: 25.3 PG (ref 26–35)
MCH RBC QN AUTO: 27.4 PG (ref 26–35)
MCHC RBC AUTO-ENTMCNC: 29.6 % (ref 32–34.5)
MCHC RBC AUTO-ENTMCNC: 29.7 % (ref 32–34.5)
MCHC RBC AUTO-ENTMCNC: 30.7 % (ref 32–34.5)
MCHC RBC AUTO-ENTMCNC: 30.7 % (ref 32–34.5)
MCHC RBC AUTO-ENTMCNC: 30.9 % (ref 32–34.5)
MCHC RBC AUTO-ENTMCNC: 31.1 % (ref 32–34.5)
MCHC RBC AUTO-ENTMCNC: 31.2 % (ref 32–34.5)
MCV RBC AUTO: 78.3 FL (ref 80–99.9)
MCV RBC AUTO: 78.5 FL (ref 80–99.9)
MCV RBC AUTO: 80.5 FL (ref 80–99.9)
MCV RBC AUTO: 80.9 FL (ref 80–99.9)
MCV RBC AUTO: 81.3 FL (ref 80–99.9)
MCV RBC AUTO: 81.6 FL (ref 80–99.9)
MCV RBC AUTO: 89 FL (ref 80–99.9)
METER GLUCOSE: 102 MG/DL (ref 74–99)
METER GLUCOSE: 106 MG/DL (ref 74–99)
METER GLUCOSE: 107 MG/DL (ref 74–99)
METER GLUCOSE: 121 MG/DL (ref 74–99)
METER GLUCOSE: 125 MG/DL (ref 74–99)
METER GLUCOSE: 126 MG/DL (ref 74–99)
METER GLUCOSE: 127 MG/DL (ref 74–99)
METER GLUCOSE: 128 MG/DL (ref 74–99)
METER GLUCOSE: 131 MG/DL (ref 74–99)
METHB: 0.3 % (ref 0–1.5)
MONOCYTES ABSOLUTE: 0.6 E9/L (ref 0.1–0.95)
MONOCYTES ABSOLUTE: 0.95 E9/L (ref 0.1–0.95)
MONOCYTES ABSOLUTE: 1.23 E9/L (ref 0.1–0.95)
MONOCYTES ABSOLUTE: 1.28 E9/L (ref 0.1–0.95)
MONOCYTES RELATIVE PERCENT: 11.3 % (ref 2–12)
MONOCYTES RELATIVE PERCENT: 5.7 % (ref 2–12)
MONOCYTES RELATIVE PERCENT: 7.8 % (ref 2–12)
MONOCYTES RELATIVE PERCENT: 7.8 % (ref 2–12)
NEUTROPHILS ABSOLUTE: 13.86 E9/L (ref 1.8–7.3)
NEUTROPHILS ABSOLUTE: 7.16 E9/L (ref 1.8–7.3)
NEUTROPHILS ABSOLUTE: 7.5 E9/L (ref 1.8–7.3)
NEUTROPHILS ABSOLUTE: 8.93 E9/L (ref 1.8–7.3)
NEUTROPHILS RELATIVE PERCENT: 63.2 % (ref 43–80)
NEUTROPHILS RELATIVE PERCENT: 71.8 % (ref 43–80)
NEUTROPHILS RELATIVE PERCENT: 74.8 % (ref 43–80)
NEUTROPHILS RELATIVE PERCENT: 90.4 % (ref 43–80)
NITRITE, URINE: NEGATIVE
O2 CONTENT: 13.3 ML/DL
O2 SATURATION: 91.1 % (ref 92–98.5)
O2HB: 90.6 % (ref 94–97)
OPERATOR ID: 3300
OVALOCYTES: ABNORMAL
OVALOCYTES: ABNORMAL
PATIENT TEMP: 37 C
PCO2: 41.1 MMHG (ref 35–45)
PDW BLD-RTO: 14.2 FL (ref 11.5–15)
PDW BLD-RTO: 14.8 FL (ref 11.5–15)
PDW BLD-RTO: 14.8 FL (ref 11.5–15)
PDW BLD-RTO: 14.9 FL (ref 11.5–15)
PDW BLD-RTO: 18.6 FL (ref 11.5–15)
PDW BLD-RTO: 18.6 FL (ref 11.5–15)
PDW BLD-RTO: 18.7 FL (ref 11.5–15)
PFO2: 2.94 MMHG/%
PH BLOOD GAS: 7.39 (ref 7.35–7.45)
PH UA: 6 (ref 5–9)
PLATELET # BLD: 227 E9/L (ref 130–450)
PLATELET # BLD: 294 E9/L (ref 130–450)
PLATELET # BLD: 306 E9/L (ref 130–450)
PLATELET # BLD: 324 E9/L (ref 130–450)
PLATELET # BLD: 347 E9/L (ref 130–450)
PLATELET # BLD: 407 E9/L (ref 130–450)
PLATELET # BLD: 421 E9/L (ref 130–450)
PMV BLD AUTO: 10 FL (ref 7–12)
PMV BLD AUTO: 10.2 FL (ref 7–12)
PMV BLD AUTO: 10.6 FL (ref 7–12)
PMV BLD AUTO: 9.4 FL (ref 7–12)
PMV BLD AUTO: 9.5 FL (ref 7–12)
PMV BLD AUTO: 9.5 FL (ref 7–12)
PMV BLD AUTO: 9.8 FL (ref 7–12)
PO2: 61.7 MMHG (ref 75–100)
POIKILOCYTES: ABNORMAL
POIKILOCYTES: ABNORMAL
POLYCHROMASIA: ABNORMAL
POLYCHROMASIA: ABNORMAL
POTASSIUM REFLEX MAGNESIUM: 4.3 MMOL/L (ref 3.5–5)
POTASSIUM SERPL-SCNC: 4.8 MMOL/L (ref 3.5–5)
POTASSIUM SERPL-SCNC: 5.2 MMOL/L (ref 3.5–5)
POTASSIUM SERPL-SCNC: 5.2 MMOL/L (ref 3.5–5)
POTASSIUM SERPL-SCNC: 6.1 MMOL/L (ref 3.5–5)
PRO-BNP: 1326 PG/ML (ref 0–450)
PRO-BNP: 4473 PG/ML (ref 0–450)
PROTEIN UA: NEGATIVE MG/DL
PROTHROMBIN TIME: 12.6 SEC (ref 9.3–12.4)
PROTHROMBIN TIME: 12.8 SEC (ref 9.3–12.4)
RBC # BLD: 3.92 E12/L (ref 3.8–5.8)
RBC # BLD: 4.1 E12/L (ref 3.8–5.8)
RBC # BLD: 4.35 E12/L (ref 3.8–5.8)
RBC # BLD: 4.36 E12/L (ref 3.8–5.8)
RBC # BLD: 4.51 E12/L (ref 3.8–5.8)
RBC # BLD: 4.74 E12/L (ref 3.8–5.8)
RBC # BLD: 4.77 E12/L (ref 3.8–5.8)
RI(T): 0.54
SARS-COV-2, NAAT: NOT DETECTED
SARS-COV-2, NAAT: NOT DETECTED
SODIUM BLD-SCNC: 138 MMOL/L (ref 132–146)
SODIUM BLD-SCNC: 141 MMOL/L (ref 132–146)
SODIUM BLD-SCNC: 142 MMOL/L (ref 132–146)
SODIUM BLD-SCNC: 142 MMOL/L (ref 132–146)
SOURCE, BLOOD GAS: ABNORMAL
SPECIFIC GRAVITY UA: 1.01 (ref 1–1.03)
TEAR DROP CELLS: ABNORMAL
THB: 10.4 G/DL (ref 11.5–16.5)
TIME ANALYZED: 1407
TOTAL CK: 135 U/L (ref 20–200)
TOTAL IRON BINDING CAPACITY: 227 MCG/DL (ref 250–450)
TOTAL IRON BINDING CAPACITY: 257 MCG/DL (ref 250–450)
TOTAL PROTEIN: 6.6 G/DL (ref 6.4–8.3)
TOTAL PROTEIN: 6.6 G/DL (ref 6.4–8.3)
TOTAL PROTEIN: 6.9 G/DL (ref 6.4–8.3)
TRIGL SERPL-MCNC: 108 MG/DL (ref 0–149)
TROPONIN, HIGH SENSITIVITY: 155 NG/L (ref 0–11)
TROPONIN, HIGH SENSITIVITY: 157 NG/L (ref 0–11)
TROPONIN, HIGH SENSITIVITY: 171 NG/L (ref 0–11)
TROPONIN, HIGH SENSITIVITY: 198 NG/L (ref 0–11)
TROPONIN, HIGH SENSITIVITY: 83 NG/L (ref 0–11)
TROPONIN: 0.01 NG/ML (ref 0–0.03)
TSH SERPL DL<=0.05 MIU/L-ACNC: 0.12 UIU/ML (ref 0.27–4.2)
TSH SERPL DL<=0.05 MIU/L-ACNC: <0.01 UIU/ML (ref 0.27–4.2)
URIC ACID, SERUM: 11.3 MG/DL (ref 3.4–7)
UROBILINOGEN, URINE: 0.2 E.U./DL
VLDLC SERPL CALC-MCNC: 22 MG/DL
WBC # BLD: 10.5 E9/L (ref 4.5–11.5)
WBC # BLD: 11.3 E9/L (ref 4.5–11.5)
WBC # BLD: 11.9 E9/L (ref 4.5–11.5)
WBC # BLD: 12.1 E9/L (ref 4.5–11.5)
WBC # BLD: 15.4 E9/L (ref 4.5–11.5)
WBC # BLD: 18 E9/L (ref 4.5–11.5)
WBC # BLD: 18.2 E9/L (ref 4.5–11.5)

## 2021-01-01 PROCEDURE — 6370000000 HC RX 637 (ALT 250 FOR IP): Performed by: INTERNAL MEDICINE

## 2021-01-01 PROCEDURE — 1036F TOBACCO NON-USER: CPT | Performed by: FAMILY MEDICINE

## 2021-01-01 PROCEDURE — 82805 BLOOD GASES W/O2 SATURATION: CPT

## 2021-01-01 PROCEDURE — 83540 ASSAY OF IRON: CPT

## 2021-01-01 PROCEDURE — 2580000003 HC RX 258: Performed by: INTERNAL MEDICINE

## 2021-01-01 PROCEDURE — 36415 COLL VENOUS BLD VENIPUNCTURE: CPT

## 2021-01-01 PROCEDURE — 2700000000 HC OXYGEN THERAPY PER DAY

## 2021-01-01 PROCEDURE — 2500000003 HC RX 250 WO HCPCS: Performed by: FAMILY MEDICINE

## 2021-01-01 PROCEDURE — 6360000002 HC RX W HCPCS: Performed by: INTERNAL MEDICINE

## 2021-01-01 PROCEDURE — 93970 EXTREMITY STUDY: CPT

## 2021-01-01 PROCEDURE — 97535 SELF CARE MNGMENT TRAINING: CPT | Performed by: OCCUPATIONAL THERAPIST

## 2021-01-01 PROCEDURE — 80053 COMPREHEN METABOLIC PANEL: CPT

## 2021-01-01 PROCEDURE — 6360000002 HC RX W HCPCS: Performed by: FAMILY MEDICINE

## 2021-01-01 PROCEDURE — 99222 1ST HOSP IP/OBS MODERATE 55: CPT | Performed by: STUDENT IN AN ORGANIZED HEALTH CARE EDUCATION/TRAINING PROGRAM

## 2021-01-01 PROCEDURE — 2060000000 HC ICU INTERMEDIATE R&B

## 2021-01-01 PROCEDURE — 70553 MRI BRAIN STEM W/O & W/DYE: CPT

## 2021-01-01 PROCEDURE — 97165 OT EVAL LOW COMPLEX 30 MIN: CPT | Performed by: OCCUPATIONAL THERAPIST

## 2021-01-01 PROCEDURE — 99239 HOSP IP/OBS DSCHRG MGMT >30: CPT | Performed by: INTERNAL MEDICINE

## 2021-01-01 PROCEDURE — 99285 EMERGENCY DEPT VISIT HI MDM: CPT

## 2021-01-01 PROCEDURE — 80048 BASIC METABOLIC PNL TOTAL CA: CPT

## 2021-01-01 PROCEDURE — 85610 PROTHROMBIN TIME: CPT

## 2021-01-01 PROCEDURE — 6370000000 HC RX 637 (ALT 250 FOR IP): Performed by: FAMILY MEDICINE

## 2021-01-01 PROCEDURE — 81003 URINALYSIS AUTO W/O SCOPE: CPT

## 2021-01-01 PROCEDURE — 84443 ASSAY THYROID STIM HORMONE: CPT

## 2021-01-01 PROCEDURE — 83880 ASSAY OF NATRIURETIC PEPTIDE: CPT

## 2021-01-01 PROCEDURE — G8417 CALC BMI ABV UP PARAM F/U: HCPCS | Performed by: FAMILY MEDICINE

## 2021-01-01 PROCEDURE — 4040F PNEUMOC VAC/ADMIN/RCVD: CPT | Performed by: FAMILY MEDICINE

## 2021-01-01 PROCEDURE — 93005 ELECTROCARDIOGRAM TRACING: CPT | Performed by: EMERGENCY MEDICINE

## 2021-01-01 PROCEDURE — APPSS60 APP SPLIT SHARED TIME 46-60 MINUTES: Performed by: PHYSICIAN ASSISTANT

## 2021-01-01 PROCEDURE — 82550 ASSAY OF CK (CPK): CPT

## 2021-01-01 PROCEDURE — 83036 HEMOGLOBIN GLYCOSYLATED A1C: CPT

## 2021-01-01 PROCEDURE — 84484 ASSAY OF TROPONIN QUANT: CPT

## 2021-01-01 PROCEDURE — 2580000003 HC RX 258: Performed by: FAMILY MEDICINE

## 2021-01-01 PROCEDURE — 85730 THROMBOPLASTIN TIME PARTIAL: CPT

## 2021-01-01 PROCEDURE — 2500000003 HC RX 250 WO HCPCS: Performed by: INTERNAL MEDICINE

## 2021-01-01 PROCEDURE — 82962 GLUCOSE BLOOD TEST: CPT

## 2021-01-01 PROCEDURE — 1123F ACP DISCUSS/DSCN MKR DOCD: CPT | Performed by: FAMILY MEDICINE

## 2021-01-01 PROCEDURE — 71045 X-RAY EXAM CHEST 1 VIEW: CPT

## 2021-01-01 PROCEDURE — 85025 COMPLETE CBC W/AUTO DIFF WBC: CPT

## 2021-01-01 PROCEDURE — A9577 INJ MULTIHANCE: HCPCS | Performed by: RADIOLOGY

## 2021-01-01 PROCEDURE — 1111F DSCHRG MED/CURRENT MED MERGE: CPT | Performed by: FAMILY MEDICINE

## 2021-01-01 PROCEDURE — 87635 SARS-COV-2 COVID-19 AMP PRB: CPT

## 2021-01-01 PROCEDURE — 82728 ASSAY OF FERRITIN: CPT

## 2021-01-01 PROCEDURE — 92507 TX SP LANG VOICE COMM INDIV: CPT | Performed by: SPEECH-LANGUAGE PATHOLOGIST

## 2021-01-01 PROCEDURE — 99232 SBSQ HOSP IP/OBS MODERATE 35: CPT | Performed by: INTERNAL MEDICINE

## 2021-01-01 PROCEDURE — 99214 OFFICE O/P EST MOD 30 MIN: CPT | Performed by: FAMILY MEDICINE

## 2021-01-01 PROCEDURE — 83550 IRON BINDING TEST: CPT

## 2021-01-01 PROCEDURE — 6360000004 HC RX CONTRAST MEDICATION: Performed by: RADIOLOGY

## 2021-01-01 PROCEDURE — 85027 COMPLETE CBC AUTOMATED: CPT

## 2021-01-01 PROCEDURE — 97110 THERAPEUTIC EXERCISES: CPT

## 2021-01-01 PROCEDURE — G8427 DOCREV CUR MEDS BY ELIG CLIN: HCPCS | Performed by: FAMILY MEDICINE

## 2021-01-01 PROCEDURE — 97161 PT EVAL LOW COMPLEX 20 MIN: CPT | Performed by: PHYSICAL THERAPIST

## 2021-01-01 PROCEDURE — 92526 ORAL FUNCTION THERAPY: CPT | Performed by: SPEECH-LANGUAGE PATHOLOGIST

## 2021-01-01 PROCEDURE — 93971 EXTREMITY STUDY: CPT

## 2021-01-01 PROCEDURE — 99232 SBSQ HOSP IP/OBS MODERATE 35: CPT | Performed by: FAMILY MEDICINE

## 2021-01-01 PROCEDURE — 73110 X-RAY EXAM OF WRIST: CPT

## 2021-01-01 PROCEDURE — 72125 CT NECK SPINE W/O DYE: CPT

## 2021-01-01 PROCEDURE — 94664 DEMO&/EVAL PT USE INHALER: CPT

## 2021-01-01 PROCEDURE — 6370000000 HC RX 637 (ALT 250 FOR IP): Performed by: EMERGENCY MEDICINE

## 2021-01-01 PROCEDURE — 2500000003 HC RX 250 WO HCPCS: Performed by: EMERGENCY MEDICINE

## 2021-01-01 PROCEDURE — 96375 TX/PRO/DX INJ NEW DRUG ADDON: CPT

## 2021-01-01 PROCEDURE — 99282 EMERGENCY DEPT VISIT SF MDM: CPT

## 2021-01-01 PROCEDURE — 84550 ASSAY OF BLOOD/URIC ACID: CPT

## 2021-01-01 PROCEDURE — 84132 ASSAY OF SERUM POTASSIUM: CPT

## 2021-01-01 PROCEDURE — 96365 THER/PROPH/DIAG IV INF INIT: CPT

## 2021-01-01 PROCEDURE — 99223 1ST HOSP IP/OBS HIGH 75: CPT | Performed by: INTERNAL MEDICINE

## 2021-01-01 PROCEDURE — 80061 LIPID PANEL: CPT

## 2021-01-01 PROCEDURE — 93010 ELECTROCARDIOGRAM REPORT: CPT | Performed by: INTERNAL MEDICINE

## 2021-01-01 PROCEDURE — 97530 THERAPEUTIC ACTIVITIES: CPT | Performed by: PHYSICAL THERAPIST

## 2021-01-01 PROCEDURE — 70450 CT HEAD/BRAIN W/O DYE: CPT

## 2021-01-01 PROCEDURE — 99231 SBSQ HOSP IP/OBS SF/LOW 25: CPT | Performed by: INTERNAL MEDICINE

## 2021-01-01 PROCEDURE — 2580000003 HC RX 258

## 2021-01-01 PROCEDURE — 97535 SELF CARE MNGMENT TRAINING: CPT

## 2021-01-01 PROCEDURE — 83605 ASSAY OF LACTIC ACID: CPT

## 2021-01-01 PROCEDURE — 99221 1ST HOSP IP/OBS SF/LOW 40: CPT | Performed by: FAMILY MEDICINE

## 2021-01-01 PROCEDURE — 92523 SPEECH SOUND LANG COMPREHEN: CPT | Performed by: SPEECH-LANGUAGE PATHOLOGIST

## 2021-01-01 PROCEDURE — 97530 THERAPEUTIC ACTIVITIES: CPT

## 2021-01-01 PROCEDURE — 93306 TTE W/DOPPLER COMPLETE: CPT

## 2021-01-01 PROCEDURE — 6360000002 HC RX W HCPCS: Performed by: EMERGENCY MEDICINE

## 2021-01-01 PROCEDURE — 93308 TTE F-UP OR LMTD: CPT

## 2021-01-01 PROCEDURE — 87040 BLOOD CULTURE FOR BACTERIA: CPT

## 2021-01-01 PROCEDURE — 92610 EVALUATE SWALLOWING FUNCTION: CPT | Performed by: SPEECH-LANGUAGE PATHOLOGIST

## 2021-01-01 PROCEDURE — 93005 ELECTROCARDIOGRAM TRACING: CPT

## 2021-01-01 PROCEDURE — 36600 WITHDRAWAL OF ARTERIAL BLOOD: CPT

## 2021-01-01 PROCEDURE — 83036 HEMOGLOBIN GLYCOSYLATED A1C: CPT | Performed by: FAMILY MEDICINE

## 2021-01-01 PROCEDURE — 99223 1ST HOSP IP/OBS HIGH 75: CPT | Performed by: STUDENT IN AN ORGANIZED HEALTH CARE EDUCATION/TRAINING PROGRAM

## 2021-01-01 RX ORDER — METOPROLOL TARTRATE 5 MG/5ML
5 INJECTION INTRAVENOUS ONCE
Status: COMPLETED | OUTPATIENT
Start: 2021-01-01 | End: 2021-01-01

## 2021-01-01 RX ORDER — METOPROLOL SUCCINATE 100 MG/1
TABLET, EXTENDED RELEASE ORAL
Qty: 90 TABLET | Refills: 0 | Status: SHIPPED
Start: 2021-01-01 | End: 2021-01-01 | Stop reason: SDUPTHER

## 2021-01-01 RX ORDER — ACETAMINOPHEN 650 MG/1
650 SUPPOSITORY RECTAL EVERY 6 HOURS PRN
Status: DISCONTINUED | OUTPATIENT
Start: 2021-01-01 | End: 2021-01-01 | Stop reason: HOSPADM

## 2021-01-01 RX ORDER — SODIUM CHLORIDE 9 MG/ML
25 INJECTION, SOLUTION INTRAVENOUS PRN
Status: DISCONTINUED | OUTPATIENT
Start: 2021-01-01 | End: 2021-01-01 | Stop reason: HOSPADM

## 2021-01-01 RX ORDER — SODIUM CHLORIDE 0.9 % (FLUSH) 0.9 %
5-40 SYRINGE (ML) INJECTION EVERY 12 HOURS SCHEDULED
Status: DISCONTINUED | OUTPATIENT
Start: 2021-01-01 | End: 2021-01-01 | Stop reason: HOSPADM

## 2021-01-01 RX ORDER — GABAPENTIN 100 MG/1
100 CAPSULE ORAL 3 TIMES DAILY
Status: DISCONTINUED | OUTPATIENT
Start: 2021-01-01 | End: 2021-01-01

## 2021-01-01 RX ORDER — DIGOXIN 0.25 MG/ML
INJECTION INTRAMUSCULAR; INTRAVENOUS
Status: DISPENSED
Start: 2021-01-01 | End: 2021-01-01

## 2021-01-01 RX ORDER — ISOSORBIDE MONONITRATE 60 MG/1
TABLET, EXTENDED RELEASE ORAL
Qty: 90 TABLET | Refills: 0 | Status: SHIPPED
Start: 2021-01-01 | End: 2021-01-01

## 2021-01-01 RX ORDER — LEVOTHYROXINE SODIUM 137 UG/1
137 TABLET ORAL DAILY
Status: DISCONTINUED | OUTPATIENT
Start: 2021-01-01 | End: 2021-01-01

## 2021-01-01 RX ORDER — POLYVINYL ALCOHOL 14 MG/ML
2 SOLUTION/ DROPS OPHTHALMIC PRN
Status: DISCONTINUED | OUTPATIENT
Start: 2021-01-01 | End: 2021-01-01 | Stop reason: HOSPADM

## 2021-01-01 RX ORDER — HEPARIN SODIUM 10000 [USP'U]/100ML
5-30 INJECTION, SOLUTION INTRAVENOUS CONTINUOUS
Status: DISCONTINUED | OUTPATIENT
Start: 2021-01-01 | End: 2021-01-01

## 2021-01-01 RX ORDER — ISOSORBIDE MONONITRATE 30 MG/1
30 TABLET, EXTENDED RELEASE ORAL DAILY
Status: DISCONTINUED | OUTPATIENT
Start: 2021-01-01 | End: 2021-01-01 | Stop reason: HOSPADM

## 2021-01-01 RX ORDER — ACETAMINOPHEN 325 MG/1
650 TABLET ORAL EVERY 4 HOURS PRN
Qty: 120 TABLET | Refills: 3 | Status: SHIPPED | OUTPATIENT
Start: 2021-01-01

## 2021-01-01 RX ORDER — LEVOTHYROXINE SODIUM 175 UG/1
175 TABLET ORAL DAILY
Qty: 90 TABLET | Refills: 0 | Status: ON HOLD
Start: 2021-01-01 | End: 2021-01-01 | Stop reason: HOSPADM

## 2021-01-01 RX ORDER — LEVOTHYROXINE SODIUM 137 UG/1
137 TABLET ORAL DAILY
Qty: 90 TABLET | Refills: 0 | Status: SHIPPED | OUTPATIENT
Start: 2021-01-01

## 2021-01-01 RX ORDER — 0.9 % SODIUM CHLORIDE 0.9 %
1000 INTRAVENOUS SOLUTION INTRAVENOUS ONCE
Status: COMPLETED | OUTPATIENT
Start: 2021-01-01 | End: 2021-01-01

## 2021-01-01 RX ORDER — LEVOTHYROXINE SODIUM 137 UG/1
137 TABLET ORAL DAILY
Qty: 30 TABLET | Refills: 0 | Status: SHIPPED
Start: 2021-01-01 | End: 2021-01-01 | Stop reason: SDUPTHER

## 2021-01-01 RX ORDER — NITROGLYCERIN 0.4 MG/1
0.4 TABLET SUBLINGUAL EVERY 5 MIN PRN
Status: DISCONTINUED | OUTPATIENT
Start: 2021-01-01 | End: 2021-01-01 | Stop reason: HOSPADM

## 2021-01-01 RX ORDER — LANOLIN ALCOHOL/MO/W.PET/CERES
1000 CREAM (GRAM) TOPICAL DAILY
Status: DISCONTINUED | OUTPATIENT
Start: 2021-01-01 | End: 2021-01-01 | Stop reason: HOSPADM

## 2021-01-01 RX ORDER — ASPIRIN 81 MG/1
324 TABLET, CHEWABLE ORAL ONCE
Status: COMPLETED | OUTPATIENT
Start: 2021-01-01 | End: 2021-01-01

## 2021-01-01 RX ORDER — LEVOTHYROXINE SODIUM 137 UG/1
137 TABLET ORAL DAILY
Qty: 30 TABLET | Refills: 0 | Status: CANCELLED | OUTPATIENT
Start: 2021-01-01

## 2021-01-01 RX ORDER — ATORVASTATIN CALCIUM 40 MG/1
40 TABLET, FILM COATED ORAL NIGHTLY
Qty: 90 TABLET | Refills: 0 | Status: SHIPPED
Start: 2021-01-01 | End: 2021-01-01 | Stop reason: SDUPTHER

## 2021-01-01 RX ORDER — DIGOXIN 0.25 MG/ML
250 INJECTION INTRAMUSCULAR; INTRAVENOUS ONCE
Status: COMPLETED | OUTPATIENT
Start: 2021-01-01 | End: 2021-01-01

## 2021-01-01 RX ORDER — SODIUM CHLORIDE 0.9 % (FLUSH) 0.9 %
5-40 SYRINGE (ML) INJECTION PRN
Status: DISCONTINUED | OUTPATIENT
Start: 2021-01-01 | End: 2021-01-01 | Stop reason: HOSPADM

## 2021-01-01 RX ORDER — CLOPIDOGREL BISULFATE 75 MG/1
75 TABLET ORAL DAILY
Status: DISCONTINUED | OUTPATIENT
Start: 2021-01-01 | End: 2021-01-01 | Stop reason: HOSPADM

## 2021-01-01 RX ORDER — ATORVASTATIN CALCIUM 40 MG/1
40 TABLET, FILM COATED ORAL NIGHTLY
Qty: 90 TABLET | Refills: 0 | Status: SHIPPED | OUTPATIENT
Start: 2021-01-01

## 2021-01-01 RX ORDER — TRAMADOL HYDROCHLORIDE 50 MG/1
50 TABLET ORAL EVERY 8 HOURS PRN
Qty: 6 TABLET | Refills: 0 | Status: SHIPPED | OUTPATIENT
Start: 2021-01-01 | End: 2021-01-01

## 2021-01-01 RX ORDER — POLYETHYLENE GLYCOL 3350 17 G/17G
17 POWDER, FOR SOLUTION ORAL DAILY PRN
Status: DISCONTINUED | OUTPATIENT
Start: 2021-01-01 | End: 2021-01-01 | Stop reason: HOSPADM

## 2021-01-01 RX ORDER — LEVOFLOXACIN 500 MG/1
500 TABLET, FILM COATED ORAL EVERY OTHER DAY
Status: DISCONTINUED | OUTPATIENT
Start: 2021-01-01 | End: 2021-01-01 | Stop reason: HOSPADM

## 2021-01-01 RX ORDER — ONDANSETRON 4 MG/1
4 TABLET, ORALLY DISINTEGRATING ORAL EVERY 8 HOURS PRN
Status: DISCONTINUED | OUTPATIENT
Start: 2021-01-01 | End: 2021-01-01 | Stop reason: HOSPADM

## 2021-01-01 RX ORDER — DILTIAZEM HYDROCHLORIDE 5 MG/ML
10 INJECTION INTRAVENOUS ONCE
Status: COMPLETED | OUTPATIENT
Start: 2021-01-01 | End: 2021-01-01

## 2021-01-01 RX ORDER — ACETAMINOPHEN 325 MG/1
650 TABLET ORAL
Status: DISCONTINUED | OUTPATIENT
Start: 2021-01-01 | End: 2021-01-01 | Stop reason: HOSPADM

## 2021-01-01 RX ORDER — GABAPENTIN 100 MG/1
100 CAPSULE ORAL 3 TIMES DAILY
Qty: 90 CAPSULE | Refills: 1 | Status: SHIPPED | OUTPATIENT
Start: 2021-01-01 | End: 2021-01-01

## 2021-01-01 RX ORDER — PROMETHAZINE HYDROCHLORIDE 25 MG/1
12.5 TABLET ORAL EVERY 6 HOURS PRN
Status: DISCONTINUED | OUTPATIENT
Start: 2021-01-01 | End: 2021-01-01 | Stop reason: HOSPADM

## 2021-01-01 RX ORDER — POLYETHYLENE GLYCOL 3350 17 G/17G
17 POWDER, FOR SOLUTION ORAL DAILY PRN
Qty: 527 G | Refills: 1 | Status: SHIPPED | OUTPATIENT
Start: 2021-01-01 | End: 2021-01-01

## 2021-01-01 RX ORDER — ACETAMINOPHEN 325 MG/1
650 TABLET ORAL PRN
Status: DISCONTINUED | OUTPATIENT
Start: 2021-01-01 | End: 2021-01-01 | Stop reason: HOSPADM

## 2021-01-01 RX ORDER — ONDANSETRON 2 MG/ML
4 INJECTION INTRAMUSCULAR; INTRAVENOUS EVERY 6 HOURS PRN
Status: DISCONTINUED | OUTPATIENT
Start: 2021-01-01 | End: 2021-01-01 | Stop reason: HOSPADM

## 2021-01-01 RX ORDER — CLOPIDOGREL BISULFATE 75 MG/1
75 TABLET ORAL DAILY
Qty: 90 TABLET | Refills: 0 | Status: SHIPPED | OUTPATIENT
Start: 2021-01-01

## 2021-01-01 RX ORDER — FUROSEMIDE 10 MG/ML
40 INJECTION INTRAMUSCULAR; INTRAVENOUS 2 TIMES DAILY
Status: DISCONTINUED | OUTPATIENT
Start: 2021-01-01 | End: 2021-01-01

## 2021-01-01 RX ORDER — LEVOTHYROXINE SODIUM 175 UG/1
175 TABLET ORAL
Status: DISCONTINUED | OUTPATIENT
Start: 2021-01-01 | End: 2021-01-01 | Stop reason: HOSPADM

## 2021-01-01 RX ORDER — METOPROLOL SUCCINATE 50 MG/1
50 TABLET, EXTENDED RELEASE ORAL DAILY
Qty: 30 TABLET | Refills: 3 | Status: SHIPPED
Start: 2021-01-01 | End: 2021-01-01 | Stop reason: SDUPTHER

## 2021-01-01 RX ORDER — ATORVASTATIN CALCIUM 20 MG/1
40 TABLET, FILM COATED ORAL NIGHTLY
Status: DISCONTINUED | OUTPATIENT
Start: 2021-01-01 | End: 2021-01-01 | Stop reason: HOSPADM

## 2021-01-01 RX ORDER — IPRATROPIUM BROMIDE AND ALBUTEROL SULFATE 2.5; .5 MG/3ML; MG/3ML
1 SOLUTION RESPIRATORY (INHALATION) EVERY 6 HOURS PRN
Status: DISCONTINUED | OUTPATIENT
Start: 2021-01-01 | End: 2021-01-01 | Stop reason: HOSPADM

## 2021-01-01 RX ORDER — LABETALOL HYDROCHLORIDE 5 MG/ML
10 INJECTION, SOLUTION INTRAVENOUS EVERY 10 MIN PRN
Status: DISCONTINUED | OUTPATIENT
Start: 2021-01-01 | End: 2021-01-01 | Stop reason: HOSPADM

## 2021-01-01 RX ORDER — LEVOTHYROXINE SODIUM 175 UG/1
TABLET ORAL
Qty: 90 TABLET | Refills: 0 | Status: SHIPPED
Start: 2021-01-01 | End: 2021-01-01 | Stop reason: SDUPTHER

## 2021-01-01 RX ORDER — AMIODARONE HYDROCHLORIDE 200 MG/1
200 TABLET ORAL 2 TIMES DAILY
Status: DISCONTINUED | OUTPATIENT
Start: 2021-01-01 | End: 2021-01-01 | Stop reason: HOSPADM

## 2021-01-01 RX ORDER — DEXTROSE MONOHYDRATE 50 MG/ML
100 INJECTION, SOLUTION INTRAVENOUS PRN
Status: DISCONTINUED | OUTPATIENT
Start: 2021-01-01 | End: 2021-01-01 | Stop reason: HOSPADM

## 2021-01-01 RX ORDER — AMIODARONE HYDROCHLORIDE 200 MG/1
400 TABLET ORAL ONCE
Status: COMPLETED | OUTPATIENT
Start: 2021-01-01 | End: 2021-01-01

## 2021-01-01 RX ORDER — FERROUS SULFATE 325(65) MG
325 TABLET ORAL DAILY
Status: DISCONTINUED | OUTPATIENT
Start: 2021-01-01 | End: 2021-01-01 | Stop reason: HOSPADM

## 2021-01-01 RX ORDER — ACETAMINOPHEN 325 MG/1
650 TABLET ORAL EVERY 6 HOURS PRN
Status: DISCONTINUED | OUTPATIENT
Start: 2021-01-01 | End: 2021-01-01 | Stop reason: HOSPADM

## 2021-01-01 RX ORDER — POLYVINYL ALCOHOL 14 MG/ML
2 SOLUTION/ DROPS OPHTHALMIC PRN
Qty: 1 BOTTLE | Refills: 4 | Status: SHIPPED | OUTPATIENT
Start: 2021-01-01 | End: 2021-01-01

## 2021-01-01 RX ORDER — LEVOFLOXACIN 500 MG/1
500 TABLET, FILM COATED ORAL DAILY
Status: DISCONTINUED | OUTPATIENT
Start: 2021-01-01 | End: 2021-01-01 | Stop reason: DRUGHIGH

## 2021-01-01 RX ORDER — CLOPIDOGREL BISULFATE 75 MG/1
75 TABLET ORAL DAILY
Qty: 90 TABLET | Refills: 0 | Status: SHIPPED
Start: 2021-01-01 | End: 2021-01-01 | Stop reason: SDUPTHER

## 2021-01-01 RX ORDER — ISOSORBIDE MONONITRATE 30 MG/1
30 TABLET, EXTENDED RELEASE ORAL DAILY
Qty: 30 TABLET | Refills: 3 | Status: SHIPPED | OUTPATIENT
Start: 2021-01-01

## 2021-01-01 RX ORDER — METOPROLOL SUCCINATE 50 MG/1
50 TABLET, EXTENDED RELEASE ORAL DAILY
Qty: 30 TABLET | Refills: 3 | Status: SHIPPED | OUTPATIENT
Start: 2021-01-01

## 2021-01-01 RX ORDER — DILTIAZEM HYDROCHLORIDE 120 MG/1
120 CAPSULE, COATED, EXTENDED RELEASE ORAL DAILY
Qty: 90 CAPSULE | Refills: 3 | Status: SHIPPED
Start: 2021-01-01 | End: 2021-01-01 | Stop reason: SINTOL

## 2021-01-01 RX ORDER — GABAPENTIN 300 MG/1
300 CAPSULE ORAL DAILY
Status: DISCONTINUED | OUTPATIENT
Start: 2021-01-01 | End: 2021-01-01 | Stop reason: HOSPADM

## 2021-01-01 RX ORDER — GABAPENTIN 300 MG/1
300 CAPSULE ORAL 3 TIMES DAILY
Status: DISCONTINUED | OUTPATIENT
Start: 2021-01-01 | End: 2021-01-01

## 2021-01-01 RX ORDER — METHYLPREDNISOLONE 4 MG/1
TABLET ORAL
Status: ON HOLD | COMMUNITY
Start: 2021-01-01 | End: 2021-01-01 | Stop reason: HOSPADM

## 2021-01-01 RX ORDER — DILTIAZEM HYDROCHLORIDE 120 MG/1
120 CAPSULE, COATED, EXTENDED RELEASE ORAL DAILY
Qty: 90 CAPSULE | Refills: 3 | Status: SHIPPED | OUTPATIENT
Start: 2021-01-01

## 2021-01-01 RX ORDER — DILTIAZEM HYDROCHLORIDE 120 MG/1
120 CAPSULE, COATED, EXTENDED RELEASE ORAL DAILY
Status: DISCONTINUED | OUTPATIENT
Start: 2021-01-01 | End: 2021-01-01 | Stop reason: HOSPADM

## 2021-01-01 RX ORDER — ONDANSETRON 4 MG/1
4 TABLET, ORALLY DISINTEGRATING ORAL EVERY 8 HOURS PRN
Qty: 30 TABLET | Refills: 0 | Status: SHIPPED | OUTPATIENT
Start: 2021-01-01

## 2021-01-01 RX ORDER — GABAPENTIN 100 MG/1
100 CAPSULE ORAL 3 TIMES DAILY
Status: DISCONTINUED | OUTPATIENT
Start: 2021-01-01 | End: 2021-01-01 | Stop reason: HOSPADM

## 2021-01-01 RX ORDER — LEVOTHYROXINE SODIUM 0.1 MG/1
100 TABLET ORAL DAILY
Status: DISCONTINUED | OUTPATIENT
Start: 2021-01-01 | End: 2021-01-01 | Stop reason: HOSPADM

## 2021-01-01 RX ORDER — AMIODARONE HYDROCHLORIDE 200 MG/1
200 TABLET ORAL DAILY
Status: DISCONTINUED | OUTPATIENT
Start: 2021-01-01 | End: 2021-01-01 | Stop reason: HOSPADM

## 2021-01-01 RX ORDER — HEPARIN SODIUM 1000 [USP'U]/ML
4000 INJECTION, SOLUTION INTRAVENOUS; SUBCUTANEOUS PRN
Status: DISCONTINUED | OUTPATIENT
Start: 2021-01-01 | End: 2021-01-01 | Stop reason: HOSPADM

## 2021-01-01 RX ORDER — LEVOFLOXACIN 500 MG/1
500 TABLET, FILM COATED ORAL DAILY
Qty: 10 TABLET | Refills: 0 | Status: SHIPPED | OUTPATIENT
Start: 2021-01-01 | End: 2021-01-01

## 2021-01-01 RX ORDER — HEPARIN SODIUM 1000 [USP'U]/ML
4000 INJECTION, SOLUTION INTRAVENOUS; SUBCUTANEOUS ONCE
Status: COMPLETED | OUTPATIENT
Start: 2021-01-01 | End: 2021-01-01

## 2021-01-01 RX ORDER — VITAMIN B COMPLEX
5000 TABLET ORAL DAILY
Status: DISCONTINUED | OUTPATIENT
Start: 2021-01-01 | End: 2021-01-01 | Stop reason: HOSPADM

## 2021-01-01 RX ORDER — DEXTROSE MONOHYDRATE 25 G/50ML
12.5 INJECTION, SOLUTION INTRAVENOUS PRN
Status: DISCONTINUED | OUTPATIENT
Start: 2021-01-01 | End: 2021-01-01 | Stop reason: HOSPADM

## 2021-01-01 RX ORDER — GABAPENTIN 100 MG/1
CAPSULE ORAL
Qty: 90 CAPSULE | Refills: 1 | Status: SHIPPED
Start: 2021-01-01 | End: 2021-01-01 | Stop reason: SDUPTHER

## 2021-01-01 RX ORDER — METOPROLOL SUCCINATE 25 MG/1
50 TABLET, EXTENDED RELEASE ORAL DAILY
Status: DISCONTINUED | OUTPATIENT
Start: 2021-01-01 | End: 2021-01-01 | Stop reason: HOSPADM

## 2021-01-01 RX ORDER — NICOTINE POLACRILEX 4 MG
15 LOZENGE BUCCAL PRN
Status: DISCONTINUED | OUTPATIENT
Start: 2021-01-01 | End: 2021-01-01 | Stop reason: HOSPADM

## 2021-01-01 RX ORDER — DILTIAZEM HYDROCHLORIDE 120 MG/1
120 CAPSULE, COATED, EXTENDED RELEASE ORAL DAILY
Status: DISCONTINUED | OUTPATIENT
Start: 2021-01-01 | End: 2021-01-01

## 2021-01-01 RX ORDER — HEPARIN SODIUM 1000 [USP'U]/ML
2000 INJECTION, SOLUTION INTRAVENOUS; SUBCUTANEOUS PRN
Status: DISCONTINUED | OUTPATIENT
Start: 2021-01-01 | End: 2021-01-01 | Stop reason: HOSPADM

## 2021-01-01 RX ORDER — CLOPIDOGREL BISULFATE 75 MG/1
TABLET ORAL
Qty: 90 TABLET | Refills: 0 | Status: SHIPPED
Start: 2021-01-01 | End: 2021-01-01 | Stop reason: SDUPTHER

## 2021-01-01 RX ADMIN — LEVOFLOXACIN 500 MG: 500 TABLET, FILM COATED ORAL at 20:13

## 2021-01-01 RX ADMIN — SODIUM CHLORIDE 1000 ML: 9 INJECTION, SOLUTION INTRAVENOUS at 16:29

## 2021-01-01 RX ADMIN — GABAPENTIN 100 MG: 100 CAPSULE ORAL at 08:27

## 2021-01-01 RX ADMIN — Medication 10 ML: at 21:45

## 2021-01-01 RX ADMIN — GABAPENTIN 100 MG: 100 CAPSULE ORAL at 08:43

## 2021-01-01 RX ADMIN — GABAPENTIN 100 MG: 100 CAPSULE ORAL at 21:39

## 2021-01-01 RX ADMIN — GABAPENTIN 300 MG: 300 CAPSULE ORAL at 15:45

## 2021-01-01 RX ADMIN — ATORVASTATIN CALCIUM 40 MG: 20 TABLET, FILM COATED ORAL at 22:20

## 2021-01-01 RX ADMIN — FERROUS SULFATE TAB 325 MG (65 MG ELEMENTAL FE) 325 MG: 325 (65 FE) TAB at 08:33

## 2021-01-01 RX ADMIN — Medication 5000 UNITS: at 10:18

## 2021-01-01 RX ADMIN — GABAPENTIN 300 MG: 300 CAPSULE ORAL at 09:54

## 2021-01-01 RX ADMIN — CLOPIDOGREL BISULFATE 75 MG: 75 TABLET ORAL at 10:19

## 2021-01-01 RX ADMIN — CLOPIDOGREL BISULFATE 75 MG: 75 TABLET ORAL at 09:53

## 2021-01-01 RX ADMIN — CLOPIDOGREL BISULFATE 75 MG: 75 TABLET ORAL at 09:16

## 2021-01-01 RX ADMIN — GABAPENTIN 300 MG: 300 CAPSULE ORAL at 22:25

## 2021-01-01 RX ADMIN — METOPROLOL SUCCINATE 50 MG: 25 TABLET, EXTENDED RELEASE ORAL at 09:54

## 2021-01-01 RX ADMIN — GADOBENATE DIMEGLUMINE 18 ML: 529 INJECTION, SOLUTION INTRAVENOUS at 19:21

## 2021-01-01 RX ADMIN — Medication 10 ML: at 22:21

## 2021-01-01 RX ADMIN — GABAPENTIN 300 MG: 300 CAPSULE ORAL at 13:41

## 2021-01-01 RX ADMIN — Medication 5000 UNITS: at 09:53

## 2021-01-01 RX ADMIN — LEVOTHYROXINE SODIUM 137 MCG: 0.14 TABLET ORAL at 09:17

## 2021-01-01 RX ADMIN — GABAPENTIN 300 MG: 300 CAPSULE ORAL at 22:01

## 2021-01-01 RX ADMIN — ATORVASTATIN CALCIUM 40 MG: 20 TABLET, FILM COATED ORAL at 21:05

## 2021-01-01 RX ADMIN — GABAPENTIN 300 MG: 300 CAPSULE ORAL at 09:17

## 2021-01-01 RX ADMIN — FERROUS SULFATE TAB 325 MG (65 MG ELEMENTAL FE) 325 MG: 325 (65 FE) TAB at 14:09

## 2021-01-01 RX ADMIN — CYANOCOBALAMIN TAB 1000 MCG 1000 MCG: 1000 TAB at 08:33

## 2021-01-01 RX ADMIN — CLOPIDOGREL BISULFATE 75 MG: 75 TABLET ORAL at 09:56

## 2021-01-01 RX ADMIN — METOPROLOL TARTRATE 5 MG: 5 INJECTION INTRAVENOUS at 04:43

## 2021-01-01 RX ADMIN — IPRATROPIUM BROMIDE AND ALBUTEROL SULFATE 1 AMPULE: .5; 3 SOLUTION RESPIRATORY (INHALATION) at 12:11

## 2021-01-01 RX ADMIN — DILTIAZEM HYDROCHLORIDE 120 MG: 120 CAPSULE, COATED, EXTENDED RELEASE ORAL at 08:33

## 2021-01-01 RX ADMIN — ACETAMINOPHEN 650 MG: 325 TABLET ORAL at 10:00

## 2021-01-01 RX ADMIN — ISOSORBIDE MONONITRATE 30 MG: 30 TABLET, EXTENDED RELEASE ORAL at 14:09

## 2021-01-01 RX ADMIN — GABAPENTIN 300 MG: 300 CAPSULE ORAL at 10:19

## 2021-01-01 RX ADMIN — LEVOTHYROXINE SODIUM 137 MCG: 0.14 TABLET ORAL at 14:09

## 2021-01-01 RX ADMIN — METOPROLOL SUCCINATE 50 MG: 25 TABLET, EXTENDED RELEASE ORAL at 21:39

## 2021-01-01 RX ADMIN — GABAPENTIN 300 MG: 300 CAPSULE ORAL at 20:13

## 2021-01-01 RX ADMIN — DILTIAZEM HYDROCHLORIDE 7.5 MG/HR: 5 INJECTION, SOLUTION INTRAVENOUS at 19:42

## 2021-01-01 RX ADMIN — METOPROLOL SUCCINATE 50 MG: 25 TABLET, EXTENDED RELEASE ORAL at 16:27

## 2021-01-01 RX ADMIN — ACETAMINOPHEN 650 MG: 325 TABLET ORAL at 05:55

## 2021-01-01 RX ADMIN — FERROUS SULFATE TAB 325 MG (65 MG ELEMENTAL FE) 325 MG: 325 (65 FE) TAB at 10:19

## 2021-01-01 RX ADMIN — ATORVASTATIN CALCIUM 40 MG: 20 TABLET, FILM COATED ORAL at 20:54

## 2021-01-01 RX ADMIN — Medication 10 ML: at 10:51

## 2021-01-01 RX ADMIN — Medication 10 ML: at 08:34

## 2021-01-01 RX ADMIN — Medication 10 ML: at 09:39

## 2021-01-01 RX ADMIN — CYANOCOBALAMIN TAB 1000 MCG 1000 MCG: 1000 TAB at 14:09

## 2021-01-01 RX ADMIN — AMIODARONE HYDROCHLORIDE 200 MG: 200 TABLET ORAL at 10:19

## 2021-01-01 RX ADMIN — DILTIAZEM HYDROCHLORIDE 5 MG/HR: 5 INJECTION, SOLUTION INTRAVENOUS at 03:24

## 2021-01-01 RX ADMIN — LEVOFLOXACIN 500 MG: 500 TABLET, FILM COATED ORAL at 20:26

## 2021-01-01 RX ADMIN — Medication 10 ML: at 22:36

## 2021-01-01 RX ADMIN — HEPARIN SODIUM 11.6 UNITS/KG/HR: 10000 INJECTION, SOLUTION INTRAVENOUS at 05:57

## 2021-01-01 RX ADMIN — ACETAMINOPHEN 650 MG: 325 TABLET ORAL at 06:31

## 2021-01-01 RX ADMIN — DIGOXIN 250 MCG: 250 INJECTION, SOLUTION INTRAMUSCULAR; INTRAVENOUS; PARENTERAL at 16:10

## 2021-01-01 RX ADMIN — ATORVASTATIN CALCIUM 40 MG: 20 TABLET, FILM COATED ORAL at 21:39

## 2021-01-01 RX ADMIN — Medication 10 ML: at 14:10

## 2021-01-01 RX ADMIN — GABAPENTIN 300 MG: 300 CAPSULE ORAL at 20:54

## 2021-01-01 RX ADMIN — ACETAMINOPHEN 650 MG: 325 TABLET ORAL at 02:06

## 2021-01-01 RX ADMIN — ACETAMINOPHEN 650 MG: 325 TABLET ORAL at 06:51

## 2021-01-01 RX ADMIN — ASPIRIN 324 MG: 81 TABLET, CHEWABLE ORAL at 16:28

## 2021-01-01 RX ADMIN — ENOXAPARIN SODIUM 30 MG: 30 INJECTION SUBCUTANEOUS at 09:55

## 2021-01-01 RX ADMIN — METOPROLOL SUCCINATE 50 MG: 25 TABLET, EXTENDED RELEASE ORAL at 14:09

## 2021-01-01 RX ADMIN — CLOPIDOGREL BISULFATE 75 MG: 75 TABLET ORAL at 16:27

## 2021-01-01 RX ADMIN — ENOXAPARIN SODIUM 30 MG: 30 INJECTION SUBCUTANEOUS at 10:18

## 2021-01-01 RX ADMIN — CLOPIDOGREL BISULFATE 75 MG: 75 TABLET ORAL at 08:28

## 2021-01-01 RX ADMIN — LEVOTHYROXINE SODIUM 100 MCG: 0.1 TABLET ORAL at 06:31

## 2021-01-01 RX ADMIN — Medication 5000 UNITS: at 14:08

## 2021-01-01 RX ADMIN — METOPROLOL SUCCINATE 50 MG: 25 TABLET, EXTENDED RELEASE ORAL at 10:18

## 2021-01-01 RX ADMIN — HEPARIN SODIUM 4000 UNITS: 1000 INJECTION INTRAVENOUS; SUBCUTANEOUS at 05:51

## 2021-01-01 RX ADMIN — AMIODARONE HYDROCHLORIDE 200 MG: 200 TABLET ORAL at 22:21

## 2021-01-01 RX ADMIN — GABAPENTIN 100 MG: 100 CAPSULE ORAL at 21:05

## 2021-01-01 RX ADMIN — ENOXAPARIN SODIUM 30 MG: 30 INJECTION SUBCUTANEOUS at 09:57

## 2021-01-01 RX ADMIN — CYANOCOBALAMIN TAB 1000 MCG 1000 MCG: 1000 TAB at 10:18

## 2021-01-01 RX ADMIN — METOPROLOL SUCCINATE 50 MG: 25 TABLET, EXTENDED RELEASE ORAL at 09:56

## 2021-01-01 RX ADMIN — FERROUS SULFATE TAB 325 MG (65 MG ELEMENTAL FE) 325 MG: 325 (65 FE) TAB at 09:18

## 2021-01-01 RX ADMIN — FERROUS SULFATE TAB 325 MG (65 MG ELEMENTAL FE) 325 MG: 325 (65 FE) TAB at 09:57

## 2021-01-01 RX ADMIN — Medication 5000 UNITS: at 08:32

## 2021-01-01 RX ADMIN — DILTIAZEM HYDROCHLORIDE 10 MG: 5 INJECTION INTRAVENOUS at 17:21

## 2021-01-01 RX ADMIN — CLOPIDOGREL BISULFATE 75 MG: 75 TABLET ORAL at 14:10

## 2021-01-01 RX ADMIN — ENOXAPARIN SODIUM 30 MG: 30 INJECTION SUBCUTANEOUS at 09:15

## 2021-01-01 RX ADMIN — METOPROLOL SUCCINATE 50 MG: 25 TABLET, EXTENDED RELEASE ORAL at 08:27

## 2021-01-01 RX ADMIN — GABAPENTIN 300 MG: 300 CAPSULE ORAL at 09:56

## 2021-01-01 RX ADMIN — ACETAMINOPHEN 650 MG: 325 TABLET ORAL at 06:44

## 2021-01-01 RX ADMIN — DILTIAZEM HYDROCHLORIDE 5 MG/HR: 5 INJECTION, SOLUTION INTRAVENOUS at 17:21

## 2021-01-01 RX ADMIN — LEVOFLOXACIN 500 MG: 500 TABLET, FILM COATED ORAL at 20:54

## 2021-01-01 RX ADMIN — FERROUS SULFATE TAB 325 MG (65 MG ELEMENTAL FE) 325 MG: 325 (65 FE) TAB at 09:54

## 2021-01-01 RX ADMIN — LEVOTHYROXINE SODIUM 137 MCG: 0.14 TABLET ORAL at 10:00

## 2021-01-01 RX ADMIN — CYANOCOBALAMIN TAB 1000 MCG 1000 MCG: 1000 TAB at 09:54

## 2021-01-01 RX ADMIN — CLOPIDOGREL BISULFATE 75 MG: 75 TABLET ORAL at 08:33

## 2021-01-01 RX ADMIN — FUROSEMIDE 40 MG: 10 INJECTION, SOLUTION INTRAMUSCULAR; INTRAVENOUS at 09:21

## 2021-01-01 RX ADMIN — CYANOCOBALAMIN TAB 1000 MCG 1000 MCG: 1000 TAB at 08:28

## 2021-01-01 RX ADMIN — METOPROLOL SUCCINATE 50 MG: 25 TABLET, EXTENDED RELEASE ORAL at 09:17

## 2021-01-01 RX ADMIN — Medication 5000 UNITS: at 09:16

## 2021-01-01 RX ADMIN — ATORVASTATIN CALCIUM 40 MG: 20 TABLET, FILM COATED ORAL at 20:13

## 2021-01-01 RX ADMIN — HEPARIN SODIUM 12.58 UNITS/KG/HR: 10000 INJECTION, SOLUTION INTRAVENOUS at 04:03

## 2021-01-01 RX ADMIN — METOPROLOL SUCCINATE 50 MG: 25 TABLET, EXTENDED RELEASE ORAL at 08:32

## 2021-01-01 RX ADMIN — DILTIAZEM HYDROCHLORIDE 5 MG/HR: 5 INJECTION, SOLUTION INTRAVENOUS at 20:42

## 2021-01-01 RX ADMIN — CYANOCOBALAMIN TAB 1000 MCG 1000 MCG: 1000 TAB at 09:56

## 2021-01-01 RX ADMIN — Medication 10 ML: at 22:02

## 2021-01-01 RX ADMIN — ISOSORBIDE MONONITRATE 30 MG: 30 TABLET, EXTENDED RELEASE ORAL at 09:56

## 2021-01-01 RX ADMIN — AMIODARONE HYDROCHLORIDE 200 MG: 200 TABLET ORAL at 09:17

## 2021-01-01 RX ADMIN — DILTIAZEM HYDROCHLORIDE 2.5 MG/HR: 5 INJECTION, SOLUTION INTRAVENOUS at 21:54

## 2021-01-01 RX ADMIN — ENOXAPARIN SODIUM 30 MG: 30 INJECTION SUBCUTANEOUS at 08:32

## 2021-01-01 RX ADMIN — ISOSORBIDE MONONITRATE 30 MG: 30 TABLET, EXTENDED RELEASE ORAL at 08:33

## 2021-01-01 RX ADMIN — ACETAMINOPHEN 650 MG: 325 TABLET ORAL at 05:57

## 2021-01-01 RX ADMIN — SODIUM CHLORIDE, PRESERVATIVE FREE 10 ML: 5 INJECTION INTRAVENOUS at 08:28

## 2021-01-01 RX ADMIN — ISOSORBIDE MONONITRATE 30 MG: 30 TABLET, EXTENDED RELEASE ORAL at 09:53

## 2021-01-01 RX ADMIN — GABAPENTIN 300 MG: 300 CAPSULE ORAL at 14:09

## 2021-01-01 RX ADMIN — GABAPENTIN 100 MG: 100 CAPSULE ORAL at 16:26

## 2021-01-01 RX ADMIN — ATORVASTATIN CALCIUM 40 MG: 20 TABLET, FILM COATED ORAL at 22:01

## 2021-01-01 RX ADMIN — DILTIAZEM HYDROCHLORIDE 120 MG: 120 CAPSULE, COATED, EXTENDED RELEASE ORAL at 08:28

## 2021-01-01 RX ADMIN — DILTIAZEM HYDROCHLORIDE 10 MG: 5 INJECTION INTRAVENOUS at 03:50

## 2021-01-01 RX ADMIN — AMIODARONE HYDROCHLORIDE 400 MG: 200 TABLET ORAL at 18:26

## 2021-01-01 RX ADMIN — CYANOCOBALAMIN TAB 1000 MCG 1000 MCG: 1000 TAB at 09:18

## 2021-01-01 RX ADMIN — LEVOTHYROXINE SODIUM 137 MCG: 0.14 TABLET ORAL at 10:02

## 2021-01-01 RX ADMIN — DILTIAZEM HYDROCHLORIDE 120 MG: 120 CAPSULE, COATED, EXTENDED RELEASE ORAL at 14:10

## 2021-01-01 RX ADMIN — DIGOXIN 250 MCG: 250 INJECTION, SOLUTION INTRAMUSCULAR; INTRAVENOUS; PARENTERAL at 02:07

## 2021-01-01 RX ADMIN — LABETALOL HYDROCHLORIDE 10 MG: 5 INJECTION INTRAVENOUS at 14:48

## 2021-01-01 RX ADMIN — DILTIAZEM HYDROCHLORIDE 10 MG: 5 INJECTION INTRAVENOUS at 05:06

## 2021-01-01 RX ADMIN — ATORVASTATIN CALCIUM 40 MG: 20 TABLET, FILM COATED ORAL at 20:26

## 2021-01-01 RX ADMIN — ENOXAPARIN SODIUM 30 MG: 30 INJECTION SUBCUTANEOUS at 21:39

## 2021-01-01 RX ADMIN — HEPARIN SODIUM 4000 UNITS: 1000 INJECTION INTRAVENOUS; SUBCUTANEOUS at 18:36

## 2021-01-01 RX ADMIN — ISOSORBIDE MONONITRATE 30 MG: 30 TABLET, EXTENDED RELEASE ORAL at 09:17

## 2021-01-01 RX ADMIN — LABETALOL HYDROCHLORIDE 10 MG: 5 INJECTION INTRAVENOUS at 06:13

## 2021-01-01 RX ADMIN — Medication 5000 UNITS: at 09:57

## 2021-01-01 RX ADMIN — ENOXAPARIN SODIUM 30 MG: 30 INJECTION SUBCUTANEOUS at 14:10

## 2021-01-01 RX ADMIN — AMIODARONE HYDROCHLORIDE 200 MG: 200 TABLET ORAL at 20:26

## 2021-01-01 RX ADMIN — ISOSORBIDE MONONITRATE 30 MG: 30 TABLET, EXTENDED RELEASE ORAL at 10:19

## 2021-01-01 RX ADMIN — ATORVASTATIN CALCIUM 40 MG: 20 TABLET, FILM COATED ORAL at 22:25

## 2021-01-01 SDOH — ECONOMIC STABILITY: INCOME INSECURITY: IN THE LAST 12 MONTHS, WAS THERE A TIME WHEN YOU WERE NOT ABLE TO PAY THE MORTGAGE OR RENT ON TIME?: NO

## 2021-01-01 SDOH — ECONOMIC STABILITY: FOOD INSECURITY: WITHIN THE PAST 12 MONTHS, YOU WORRIED THAT YOUR FOOD WOULD RUN OUT BEFORE YOU GOT MONEY TO BUY MORE.: NEVER TRUE

## 2021-01-01 SDOH — ECONOMIC STABILITY: INCOME INSECURITY: HOW HARD IS IT FOR YOU TO PAY FOR THE VERY BASICS LIKE FOOD, HOUSING, MEDICAL CARE, AND HEATING?: NOT HARD AT ALL

## 2021-01-01 SDOH — ECONOMIC STABILITY: HOUSING INSECURITY: IN THE LAST 12 MONTHS, HOW MANY PLACES HAVE YOU LIVED?: 1

## 2021-01-01 SDOH — ECONOMIC STABILITY: FOOD INSECURITY: WITHIN THE PAST 12 MONTHS, THE FOOD YOU BOUGHT JUST DIDN'T LAST AND YOU DIDN'T HAVE MONEY TO GET MORE.: NEVER TRUE

## 2021-01-01 SDOH — ECONOMIC STABILITY: TRANSPORTATION INSECURITY
IN THE PAST 12 MONTHS, HAS LACK OF TRANSPORTATION KEPT YOU FROM MEETINGS, WORK, OR FROM GETTING THINGS NEEDED FOR DAILY LIVING?: NO

## 2021-01-01 SDOH — ECONOMIC STABILITY: HOUSING INSECURITY
IN THE LAST 12 MONTHS, WAS THERE A TIME WHEN YOU DID NOT HAVE A STEADY PLACE TO SLEEP OR SLEPT IN A SHELTER (INCLUDING NOW)?: NO

## 2021-01-01 ASSESSMENT — ENCOUNTER SYMPTOMS
TACHYPNEA: 1
EYE REDNESS: 0
WHEEZING: 0
DIARRHEA: 0
ABDOMINAL PAIN: 0
WHEEZING: 0
CONSTIPATION: 0
DIARRHEA: 0
NAUSEA: 0
SHORTNESS OF BREATH: 0
COUGH: 0
TACHYPNEA: 1
SINUS PRESSURE: 0
CHEST TIGHTNESS: 0
SORE THROAT: 0
NAUSEA: 0
BACK PAIN: 0
WHEEZING: 0
SORE THROAT: 0
VOMITING: 0
EYE ITCHING: 0
TROUBLE SWALLOWING: 0
VOMITING: 0
ABDOMINAL PAIN: 0
DIARRHEA: 0
SHORTNESS OF BREATH: 1
RHINORRHEA: 0
CHEST TIGHTNESS: 0
BACK PAIN: 0
EYE DISCHARGE: 0
BACK PAIN: 0
ABDOMINAL DISTENTION: 0
EYE REDNESS: 0
COUGH: 0
ABDOMINAL PAIN: 0
SHORTNESS OF BREATH: 0
VOMITING: 0
NAUSEA: 0
EYE PAIN: 0

## 2021-01-01 ASSESSMENT — PAIN DESCRIPTION - ORIENTATION
ORIENTATION: ANTERIOR;POSTERIOR
ORIENTATION: ANTERIOR;POSTERIOR
ORIENTATION: RIGHT;LEFT;UPPER

## 2021-01-01 ASSESSMENT — PAIN DESCRIPTION - LOCATION
LOCATION: GENERALIZED
LOCATION: BACK;SHOULDER;GENERALIZED
LOCATION: BACK;SHOULDER;ARM
LOCATION: GENERALIZED

## 2021-01-01 ASSESSMENT — PAIN DESCRIPTION - PAIN TYPE
TYPE: CHRONIC PAIN

## 2021-01-01 ASSESSMENT — PAIN DESCRIPTION - ONSET
ONSET: ON-GOING

## 2021-01-01 ASSESSMENT — PAIN SCALES - GENERAL
PAINLEVEL_OUTOF10: 0
PAINLEVEL_OUTOF10: 2
PAINLEVEL_OUTOF10: 2
PAINLEVEL_OUTOF10: 0
PAINLEVEL_OUTOF10: 3
PAINLEVEL_OUTOF10: 0
PAINLEVEL_OUTOF10: 0
PAINLEVEL_OUTOF10: 2
PAINLEVEL_OUTOF10: 2
PAINLEVEL_OUTOF10: 4
PAINLEVEL_OUTOF10: 0
PAINLEVEL_OUTOF10: 2
PAINLEVEL_OUTOF10: 0
PAINLEVEL_OUTOF10: 3
PAINLEVEL_OUTOF10: 0
PAINLEVEL_OUTOF10: 3
PAINLEVEL_OUTOF10: 0
PAINLEVEL_OUTOF10: 0

## 2021-01-01 ASSESSMENT — PAIN DESCRIPTION - DESCRIPTORS
DESCRIPTORS: ACHING;DISCOMFORT;CONSTANT
DESCRIPTORS: ACHING;DISCOMFORT;CONSTANT
DESCRIPTORS: ACHING;SORE

## 2021-01-01 ASSESSMENT — PATIENT HEALTH QUESTIONNAIRE - PHQ9
SUM OF ALL RESPONSES TO PHQ QUESTIONS 1-9: 0
SUM OF ALL RESPONSES TO PHQ QUESTIONS 1-9: 0
SUM OF ALL RESPONSES TO PHQ9 QUESTIONS 1 & 2: 0
2. FEELING DOWN, DEPRESSED OR HOPELESS: 0

## 2021-01-01 ASSESSMENT — PAIN DESCRIPTION - PROGRESSION
CLINICAL_PROGRESSION: NOT CHANGED
CLINICAL_PROGRESSION: GRADUALLY IMPROVING
CLINICAL_PROGRESSION: NOT CHANGED
CLINICAL_PROGRESSION: NOT CHANGED

## 2021-01-01 ASSESSMENT — PAIN DESCRIPTION - FREQUENCY
FREQUENCY: CONTINUOUS

## 2021-01-01 ASSESSMENT — PAIN - FUNCTIONAL ASSESSMENT
PAIN_FUNCTIONAL_ASSESSMENT: PREVENTS OR INTERFERES WITH MANY ACTIVE NOT PASSIVE ACTIVITIES
PAIN_FUNCTIONAL_ASSESSMENT: PREVENTS OR INTERFERES SOME ACTIVE ACTIVITIES AND ADLS
PAIN_FUNCTIONAL_ASSESSMENT: PREVENTS OR INTERFERES WITH MANY ACTIVE NOT PASSIVE ACTIVITIES

## 2021-01-01 ASSESSMENT — LIFESTYLE VARIABLES
HOW OFTEN DO YOU HAVE A DRINK CONTAINING ALCOHOL: NEVER
HOW MANY STANDARD DRINKS CONTAINING ALCOHOL DO YOU HAVE ON A TYPICAL DAY: 1 OR 2

## 2021-05-03 NOTE — ED PROVIDER NOTES
Chief complaint:  Right arm and right leg edema    HPI history provided by the patient and family  Patient family are here, the patient is complaining of some right wrist swelling and pain for the last several days with no fall or injury. No redness or fevers. No general arm swelling or pain. No chest pain, palpitations or shortness of breath. No lightheadedness or syncope. No abdominal pain or flank pain or back pain. Family concerned because they feel the right leg is more swollen than the left leg as well and somebody the family or some of the new had a blood clot when they had swelling so they wanted the patient evaluated. Patient denies any right leg pain, numbness or weakness and denies the actual unilateral swelling. No falls or injuries per the patient. No treatment prior to arrival.  Nothing makes them better or worse otherwise. Review of Systems   Constitutional: Negative for chills, diaphoresis, fatigue and fever. HENT: Negative for congestion and sore throat. Respiratory: Negative for cough, chest tightness, shortness of breath and wheezing. Cardiovascular: Positive for leg swelling. Negative for chest pain and palpitations. Gastrointestinal: Negative for abdominal pain, diarrhea, nausea and vomiting. Genitourinary: Negative for dysuria, flank pain and frequency. Musculoskeletal: Positive for arthralgias. Negative for back pain, gait problem, joint swelling, myalgias, neck pain and neck stiffness. Skin: Negative for rash and wound. Neurological: Negative for dizziness, seizures, syncope, weakness, light-headedness, numbness and headaches. Hematological: Negative for adenopathy. All other systems reviewed and are negative. Physical Exam  Vitals signs and nursing note reviewed. Constitutional:       General: He is not in acute distress. Appearance: He is well-developed. He is not ill-appearing, toxic-appearing or diaphoretic.    HENT:      Head: Normocephalic and atraumatic. Eyes:      General: No scleral icterus. Pupils: Pupils are equal, round, and reactive to light. Neck:      Musculoskeletal: Normal range of motion and neck supple. Normal range of motion. No neck rigidity, injury, pain with movement, spinous process tenderness or muscular tenderness. Cardiovascular:      Rate and Rhythm: Normal rate and regular rhythm. Heart sounds: Normal heart sounds. No murmur. Pulmonary:      Effort: Pulmonary effort is normal. No respiratory distress. Breath sounds: Normal breath sounds. No stridor, decreased air movement or transmitted upper airway sounds. No decreased breath sounds, wheezing, rhonchi or rales. Chest:      Chest wall: No tenderness. Abdominal:      General: Bowel sounds are normal. There is no distension. Palpations: Abdomen is soft. There is no pulsatile mass. Tenderness: There is no abdominal tenderness. There is no right CVA tenderness, left CVA tenderness, guarding or rebound. Musculoskeletal:         General: Swelling and tenderness present. No deformity or signs of injury. Right lower leg: He exhibits no tenderness. Edema present. Left lower leg: He exhibits no tenderness. Edema present. Comments: +1 bilateral pretibial edema to the lower extremities with no unilateral leg swelling or calf pain. Weak but present and equal dorsal pedal pulses palpable to both feet. Skin is warm throughout the lower extremities and equal temperature down into the feet bilaterally. Right wrist with general arthritic architectural changes with mild swelling to the wrist with general tenderness on palpation and with range of motion although no appreciable increased warmth and no erythema or gross swelling. Right hand is nontender and is neurovascular intact. Right forearm and the rest of the right arm show no swelling, tenderness or erythema. Lymphadenopathy:      Cervical: No cervical adenopathy.    Skin:     General: Skin is warm and dry. Coloration: Skin is not cyanotic, jaundiced or pale. Findings: No erythema or rash. Nails: There is no clubbing. Neurological:      General: No focal deficit present. Mental Status: He is alert and oriented to person, place, and time. Cranial Nerves: No cranial nerve deficit. Coordination: Coordination normal.          Procedures     [facility]     ED Course as of May 03 0148   Mon May 03, 2021   0119 Patient sitting the bed resting comfortably no distress. Family at bedside. He is updated on work-up results to this point. We are going to recheck the potassium. The right wrist pain is not new, he already has an appointment this week with orthopedics for follow-up on this. There were no acute changes and it is apparent that this is been an ongoing issue. He has no shortness of breath and no unilateral leg pain or swelling on the exam.  His exam is pretty much unchanged otherwise. They already know about the renal insufficiency. They are very comfortable going home with outpatient follow-up for everything. [NC]      ED Course User Index  [NC] Sharri Hines DO          EKG Interpretation    Interpreted by emergency department physician    Rhythm: normal sinus   Rate: 76  Axis: normal  Ectopy: premature ventricular contractions (unifocal)  Conduction: normal  ST Segments: no acute change  T Waves: no acute change  Q Waves: none    Clinical Impression: no acute changes    Madison Gentry     ED Course as of May 03 0148   Mon May 03, 2021   0119 Patient sitting the bed resting comfortably no distress. Family at bedside. He is updated on work-up results to this point. We are going to recheck the potassium. The right wrist pain is not new, he already has an appointment this week with orthopedics for follow-up on this. There were no acute changes and it is apparent that this is been an ongoing issue.   He has no shortness of breath and no unilateral leg pain or swelling on the exam.  His exam is pretty much unchanged otherwise. They already know about the renal insufficiency. They are very comfortable going home with outpatient follow-up for everything. [NC]      ED Course User Index  [NC] Rin Quan , DO       --------------------------------------------- PAST HISTORY ---------------------------------------------  Past Medical History:  has a past medical history of Acute blood loss anemia, Acute MI ([facility] [address] Basal cell carcinoma, Chronic kidney disease, Colitis, Diabetes ([facility] [address] GERD (gastroesophageal reflux disease), Hypertension, Liver abscess, Overactive bladder, and Thyroid disease. Past Surgical History:  has a past surgical history that includes joint replacement (Bilateral); Colonoscopy; Appendectomy; skin biopsy; back surgery; Circumcision, non- (2016); Cholecystectomy; Liver surgery; Cardiac catheterization (2017); Coronary angioplasty with stent (Right, 2017); Appendectomy (1939); Atherectomy (); Total knee arthroplasty (Bilateral, ); Circumcision, non- (); Cardiac catheterization (); and Diagnostic Cardiac Cath Lab Procedure (2017; 17). Social History:  reports that he quit smoking about 71 years ago. He has never used smokeless tobacco. He reports that he does not drink alcohol or use drugs. Family History: family history is not on file. The patients home medications have been reviewed. Allergies: Patient has no known allergies.     -------------------------------------------------- RESULTS -------------------------------------------------  Labs:  Results for orders placed or performed during the hospital encounter of 21   CBC Auto Differential   Result Value Ref Range    WBC 11.9 (H) 4.5 - 11.5 E9/L    RBC 3.92 3.80 - 5.80 E12/L    Hemoglobin 9.9 (L) 12.5 - 16.5 g/dL    Hematocrit 31.7 (L) 37.0 - 54.0 %    MCV 80.9 80.0 - 99.9 fL    MCH 25.3 (L) 26.0 - 35.0 pg    MCHC 31.2 (L) 32.0 - 34.5 %    RDW 14.8 11.5 - 15.0 fL    Platelets 853 684 - 654 E9/L    MPV 10.6 7.0 - 12.0 fL    Neutrophils % 74.8 43.0 - 80.0 %    Lymphocytes % 13.9 (L) 20.0 - 42.0 %    Monocytes % 7.8 2.0 - 12.0 %    Eosinophils % 2.6 0.0 - 6.0 %    Basophils % 0.9 0.0 - 2.0 %    Neutrophils Absolute 8.93 (H) 1.80 - 7.30 E9/L    Lymphocytes Absolute 1.67 1.50 - 4.00 E9/L    Monocytes Absolute 0.95 0.10 - 0.95 E9/L    Eosinophils Absolute 0.31 0.05 - 0.50 E9/L    Basophils Absolute 0.11 0.00 - 0.20 E9/L    Polychromasia 2+     Poikilocytes 2+     Acanthocytes 1+     [place] Cells 2+     Ovalocytes 1+     Tear Drop Cells 1+    Comprehensive Metabolic Panel   Result Value Ref Range    Sodium 138 132 - 146 mmol/L    Potassium 6.1 (H) 3.5 - 5.0 mmol/L    Chloride 106 98 - 107 mmol/L    CO2 23 22 - 29 mmol/L    Anion Gap 9 7 - 16 mmol/L    Glucose 146 (H) 74 - 99 mg/dL    BUN 40 (H) 6 - 23 mg/dL    CREATININE 2.5 (H) 0.7 - 1.2 mg/dL    GFR Non-African American 24 >=60 mL/min/1.73    GFR African American 29     Calcium 8.5 (L) 8.6 - 10.2 mg/dL    Total Protein 6.6 6.4 - 8.3 g/dL    Albumin 3.0 (L) 3.5 - 5.2 g/dL    Total Bilirubin <0.2 0.0 - 1.2 mg/dL    Alkaline Phosphatase 150 (H) 40 - 129 U/L    ALT 11 0 - 40 U/L    AST 32 0 - 39 U/L   Protime-INR   Result Value Ref Range    Protime 12.8 (H) 9.3 - 12.4 sec    INR 1.1    APTT   Result Value Ref Range    aPTT 24.5 24.5 - 35.1 sec   Brain Natriuretic Peptide   Result Value Ref Range    Pro-BNP 1,326 (H) 0 - 450 pg/mL   Troponin   Result Value Ref Range    Troponin 0.01 0.00 - 0.03 ng/mL   Uric Acid   Result Value Ref Range    Uric Acid, Serum 11.3 (H) 3.4 - 7.0 mg/dL   Potassium   Result Value Ref Range    Potassium 5.2 (H) 3.5 - 5.0 mmol/L   EKG 12 Lead   Result Value Ref Range    Ventricular Rate 76 BPM    Atrial Rate 76 BPM    P-R Interval 154 ms    QRS Duration 82 ms    Q-T Interval 386 ms    QTc Calculation (Bazett) 434 ms    P Axis 46 degrees    R Axis -20 degrees    T Axis -15 degrees       Radiology:  XR CHEST 1 VIEW   Final Result   Similar chronic appearing findings. PA and lateral views would be useful for   further assessment, if symptoms persist.         XR WRIST RIGHT (MIN 3 VIEWS)   Final Result   Soft tissue edema. Prominent degenerative changes. No acute bony abnormality. Short-term follow-up if symptoms persist.         US DUP UPPER EXTREMITY RIGHT VENOUS   Final Result   No evidence of DVT. US DUP LOWER EXTREMITIES BILATERAL VENOUS   Final Result   No evidence of DVT in either lower extremity.             ------------------------- NURSING NOTES AND VITALS REVIEWED ---------------------------  Date / Time Roomed:  5/2/2021 10:51 PM  ED Bed Assignment:  03/03    The nursing notes within the ED encounter and vital signs as below have been reviewed. /67   Pulse 86   Temp 98 °F (36.7 °C) (Oral)   Resp 20   Ht 5' 8\" (1.727 m)   Wt 190 lb (86.2 kg)   SpO2 93%   BMI 28.89 kg/m²   Oxygen Saturation Interpretation: Normal      ------------------------------------------ PROGRESS NOTES ------------------------------------------  I have spoken with the patient and family and discussed todays results, in addition to providing specific details for the plan of care and counseling regarding the diagnosis and prognosis. Their questions are answered at this time and they are agreeable with the plan. I discussed at length with them reasons for immediate return here for re evaluation. They will followup with primary care by calling their office tomorrow. --------------------------------- ADDITIONAL PROVIDER NOTES ---------------------------------  At this time the patient is without objective evidence of an acute process requiring hospitalization or inpatient management. They have remained hemodynamically stable throughout their entire ED visit and are stable for discharge with outpatient follow-up.      The plan has been discussed in

## 2021-05-13 NOTE — PATIENT INSTRUCTIONS
LOW SALT,LOW CARB. AND LOW FAT DIET. CONTINUE CURRENT MEDICATIONS TAKING REGULARLY. STOP TAKING METFORMIN. DECREASE DOSE OF METOPROLOL TO 50 MG. DAILY. HOLD CARDIZEM IF BP IS LESS THAN 110/70. REGULAR WALKING ADVISED. ADVISED WEIGHT REDUCTION.   NEXT APPOINTMENT IN 1 MONTH FOR ANNUAL PHYSICAL EXAMINATION

## 2021-05-13 NOTE — PROGRESS NOTES
OFFICE PROGRESS NOTE      SUBJECTIVE:        Patient ID:   David Awan is a 80 y.o. male who presents for   Chief Complaint   Patient presents with    Diabetes    Medication Refill    Health Maintenance     due for awv and shingles dtap           HPI:     FEELS GOOD. DIZZINESS OFF AND ON. NO H/O FALLING DOWN. ALSO FEELS WEAK AND TIRED. WATCHING DIET GOOD. WALKING FOR EXERCISE. TAKING MEDICATIONS REGULARLY. NOT TAKING IRON TABLETS BUT WILL RESUME NOW. Prior to Admission medications    Medication Sig Start Date End Date Taking? Authorizing Provider   gabapentin (NEURONTIN) 100 MG capsule Take 1 capsule by mouth 3 times daily for 100 days. 5/13/21 8/21/21 Yes Cihquita Del Real MD   levothyroxine (SYNTHROID) 175 MCG tablet Take 1 tablet by mouth Daily 5/13/21  Yes Chiquita Del Real MD   metoprolol succinate (TOPROL XL) 50 MG extended release tablet Take 1 tablet by mouth daily 5/13/21  Yes Chiquita Del Real MD   clopidogrel (PLAVIX) 75 MG tablet Take 1 tablet by mouth daily 5/13/21  Yes Chiquita Del Real MD   atorvastatin (LIPITOR) 40 MG tablet Take 1 tablet by mouth nightly 5/13/21  Yes Chiquita Del Real MD   metFORMIN (GLUCOPHAGE) 500 MG tablet Take 1 tablet by mouth daily (with breakfast) 5/13/21  Yes Chiquita Del Real MD   dilTIAZem (CARDIZEM CD) 120 MG extended release capsule Take 1 capsule by mouth daily 5/13/21  Yes Chiquita Del Real MD   Ferrous Sulfate (IRON) 325 (65 Fe) MG TABS Take by mouth   Yes Historical Provider, MD   Cholecalciferol (VITAMIN D3) 125 MCG (5000 UT) TABS Take by mouth   Yes Historical Provider, MD   isosorbide mononitrate (IMDUR) 60 MG extended release tablet Take 1 tablet by mouth daily 8/11/20  Yes Chiquita Del Real MD   vitamin B-12 (CYANOCOBALAMIN) 1000 MCG tablet Take 1,000 mcg by mouth daily   Yes Historical Provider, MD   nitroGLYCERIN (NITROSTAT) 0.4 MG SL tablet nitroglycerin 0.4 mg sublingual tablet   Place 1 tablet by sublingual route. Muscular strength appears intact. No joint effusion, tenderness, swelling or warmth  Neuro:  No focal motor or sensory deficits        ASSESSMENT     Patient Active Problem List    Diagnosis Date Noted    Acquired hypothyroidism      Priority: High     Class: Chronic    Essential hypertension      Priority: High     Class: Chronic    GERD (gastroesophageal reflux disease)      Priority: Low    Type 2 diabetes mellitus with kidney complication, without long-term current use of insulin ([facility])      Priority: Low    Colitis      Priority: Low    CKD (chronic kidney disease) stage 4, GFR 15-29 ml/min ([facility]) 08/11/2020    Atrial fibrillation with RVR ([facility] [address] 12/11/2019        Diagnosis:     ICD-10-CM    1. Type 2 diabetes mellitus with stage 3a chronic kidney disease, without long-term current use of insulin ([facility])  E11.21 POCT glycosylated hemoglobin (Hb A1C)    N18.31     CONTROLLED   2. CKD (chronic kidney disease) stage 4, GFR 15-29 ml/min ([facility])  N18.4     STABLE   3. Atrial fibrillation with RVR ([facility])  I48.91     CONTROLLED   4. Essential hypertension  I10     LOW- SYMTOMATIC. 5. Acquired hypothyroidism  E03.9     CONTROLLED       PLAN:     A1C OF 6.0  TODAY DISCUSSED AND ADVISED. Patient Instructions   LOW SALT,LOW CARB. AND LOW FAT DIET. CONTINUE CURRENT MEDICATIONS TAKING REGULARLY. STOP TAKING METFORMIN. DECREASE DOSE OF METOPROLOL TO 50 MG. DAILY. HOLD CARDIZEM IF BP IS LESS THAN 110/70. REGULAR WALKING ADVISED. ADVISED WEIGHT REDUCTION. NEXT APPOINTMENT IN 1 MONTH FOR ANNUAL PHYSICAL EXAMINATION       Return in about 1 month (around 6/13/2021) for FOLLOW UP VISIT. I have reviewed my findings and recommendations with Kelsey Sánchez.     Electronically signed by Pat Weaver MD on 5/13/21 at 2:16 PM EDT

## 2021-05-29 PROBLEM — I21.4 NSTEMI (NON-ST ELEVATED MYOCARDIAL INFARCTION) (HCC): Status: ACTIVE | Noted: 2021-01-01

## 2021-05-29 NOTE — H&P
[facility] History and Physical      CHIEF COMPLAINT: Chest pain    History of Present Illness: 80-year-old male with a history of CKD stage IV, hypothyroidism, diabetes mellitus type 2 on Metformin only, atherosclerotic heart disease with prior stents, diastolic heart failure, atrial fibrillation. He reports for the past few days even with minimal exertion he gets short of breath and feels that his oxygen is low. He then gets substernal chest pain and has to gasp for air. After doing this for a while his symptoms resolved. He presented to the ED for evaluation of this as it has been occurring with all exertion. He was noted to be in A. fib with RVR with heart rate up to 150s. Did not feel any palpitations. States he takes his medications regularly. Has longstanding lightheadedness when he stands up. No fever chills, no URTI symptoms, no anosmia or dysgeusia. Received Covid vaccine. Ori Rivas He was noted to have lower extremity edema when he was in the ER, states he did not notice this before. Was given Lopressor but remains in A. fib RVR, started on Cardizem drip but when seen he was in normal sinus rhythm and off Cardizem. Also found to have elevated troponin, cardiology was consulted, he was started on heparin drip and referred for admission. Chest pain-free when seen.     Informant(s) for H&P: Patient    REVIEW OF SYSTEMS:  A comprehensive 14 point review of systems was negative except for: what is in the HPI    PMH:  Past Medical History:   Diagnosis Date    Acute blood loss anemia 5/14/2017    Acute MI ([facility] [address] 4/17/2017    Basal cell carcinoma 2018    Chronic kidney disease     Colitis     Diabetes ([facility] [address]     GERD (gastroesophageal reflux disease)     Hypertension     Liver abscess 2014    Overactive bladder     Thyroid disease        Surgical History:  Past Surgical History:   Procedure Laterality Date    APPENDECTOMY      APPENDECTOMY  1939    ATHERECTOMY  1995    BACK SURGERY      lumbar 1,7    CARDIAC CATHETERIZATION  2017    Dr. Tan Oh     [address], NON-  2016    Dr Rodolfo Huff, [place]  [address]    COLONOSCOPY      CORONARY ANGIOPLASTY WITH STENT PLACEMENT Right 2017    Dr Neeta Montenegro x 2 LAD x 1 diagonal x 1 RCA    DIAGNOSTIC CARDIAC CATH LAB PROCEDURE  2017; 17    Dr. Antwon Myers Bilateral     LIVER SURGERY      SKIN BIOPSY      ear    TOTAL KNEE ARTHROPLASTY Bilateral        Medications Prior to Admission:    Prior to Admission medications    Medication Sig Start Date End Date Taking? Authorizing Provider   isosorbide mononitrate (IMDUR) 60 MG extended release tablet Take 1 tablet by mouth once daily 21   Claire Juarez MD   gabapentin (NEURONTIN) 100 MG capsule Take 1 capsule by mouth 3 times daily for 100 days.  21  Claire Juarez MD   levothyroxine (SYNTHROID) 175 MCG tablet Take 1 tablet by mouth Daily 21   Claire Juarez MD   metoprolol succinate (TOPROL XL) 50 MG extended release tablet Take 1 tablet by mouth daily 21   Claire Juarez MD   clopidogrel (PLAVIX) 75 MG tablet Take 1 tablet by mouth daily 21   Claire Juarez MD   atorvastatin (LIPITOR) 40 MG tablet Take 1 tablet by mouth nightly 21   Claire Juarez MD   metFORMIN (GLUCOPHAGE) 500 MG tablet Take 1 tablet by mouth daily (with breakfast) 21   Claire Juarez MD   dilTIAZem (CARDIZEM CD) 120 MG extended release capsule Take 1 capsule by mouth daily 21   Claire Juarez MD   Ferrous Sulfate (IRON) 325 (65 Fe) MG TABS Take by mouth    Historical Provider, MD   Cholecalciferol (VITAMIN D3) 125 MCG (5000 UT) TABS Take by mouth    Historical Provider, MD   vitamin B-12 (CYANOCOBALAMIN) 1000 MCG tablet Take 1,000 mcg by mouth daily    Historical Provider, MD   nitroGLYCERIN (NITROSTAT) 0.4 MG SL tablet nitroglycerin 0.4 mg sublingual tablet   Place 1 tablet by sublingual route. 12/14/19   Saud Romeo MD   acetaminophen (TYLENOL) 325 MG tablet Take 650 mg by mouth every morning (before breakfast)    Historical Provider, MD       Allergies:    Patient has no known allergies. Social History:    reports that he quit smoking about 71 years ago. He has never used smokeless tobacco. He reports that he does not drink alcohol and does not use drugs. Family History:   Noncontributory      PHYSICAL EXAM:  Vitals:  /69   Pulse 68   Temp 98 °F (36.7 °C) (Oral)   Resp 18   Ht 5' 8\" (1.727 m)   Wt 190 lb (86.2 kg)   SpO2 98%   BMI 28.89 kg/m²   General Appearance: alert and oriented to person, place and time and in no acute distress  Skin: warm and dry, turgor not diminished  Head: normocephalic and atraumatic  Eyes: pupils equal, round, and reactive to light, extraocular eye movements intact, conjunctivae normal  Neck: neck supple and non tender without mass   Pulmonary/Chest: clear to auscultation bilaterally- no wheezes, rales or rhonchi, normal air movement, no respiratory distress  Cardiovascular: normal rate, normal S1 and S2 and 2 out of 6 systolic ejection murmur  Abdomen: soft, non-tender, non-distended, normal bowel sounds, no masses or organomegaly  Extremities: no cyanosis, no clubbing and +2 edema  Neurologic: no cranial nerve deficit and speech normal    LABS:  Recent Labs     05/29/21  0434      K 4.8      CO2 21*   BUN 56*   CREATININE 2.5*   GLUCOSE 112*   CALCIUM 8.7       Recent Labs     05/29/21  0434 05/29/21  0542   WBC 18.0* 18.2*   RBC 4.51 4.10   HGB 10.9* 9.9*   HCT 35.3* 32.2*   MCV 78.3* 78.5*   MCH 24.2* 24.1*   MCHC 30.9* 30.7*   RDW 14.9 14.8    294   MPV 9.8 10.0       No results for input(s): POCGLU in the last 72 hours.     Radiology:   XR CHEST PORTABLE    (Results Pending)       EKG: Atrial fibrillation with RVR    ASSESSMENT:    Atrial fibrillation with RVR: Now in sinus rhythm  NSTEMI  Dyspnea on exertion  Fluid overload:?  Acute on chronic diastolic heart failure  CKD stage IV: Creatinine at baseline  Hypothyroidism  Atherosclerotic heart disease with prior stents  Diabetes mellitus type 2    PLAN:  A. fib with RVR: Now in sinus rhythm. Resume home metoprolol and Cardizem. Not on anticoagulation, per prior cardiology notes he refused anticoagulation. NSTEMI: Cardiology consulted in ED. Trend troponins. Continue heparin drip. NPO. Check echo. Fluid overload: Likely heart failure:?  Acute diastolic versus due to NSTEMI. Diuresis. Cardiology on board    Hypothyroidism: Resume Synthroid. Check TSH. CKD stage IV: Creatinine at baseline    Diabetes mellitus type 2 on Metformin only. Hold. SSI. Code Status: DNR arrest  DVT prophylaxis: On heparin      NOTE: This report was transcribed using voice recognition software. Every effort was made to ensure accuracy; however, inadvertent computerized transcription errors may be present.   Electronically signed by Emerald Ho MD on 5/29/2021 at 6:26 AM

## 2021-05-29 NOTE — CONSULTS
INPATIENT CARDIOLOGY CONSULT    Name: Linh Macias    Age: 80 y.o. Date of Service: 5/29/2021    History of Present Illness:  51-year-old male has a medical history of coronary artery disease post PCI to the LAD, diagonal 1 and RCA in 0097 complicated with acute in-stent thrombosis 1 week later post PCI, preserved ejection fraction on Plavix, metoprolol succinate 50 mg daily, atorvastatin 40 mg daily, isosorbide 60 mg daily, diltiazem 120 mg daily, diabetes mellitus on Metformin, chronic kidney disease baseline creatinine 2.5, DNR CODE STATUS, hypothyroidism, previous smoking, anemia of chronic disease hemoglobin between 9 and 10, severe left ventricular hypertrophy. He presents with increasing shortness of breath and chest discomfort for the last few days. He was found to be in atrial fibrillation new onset rapid ventricular response 150 bpm for which she was started on diltiazem drip with improvement in his heart rate to 70s beats per minute. He was started on heparin. Labs were significant for elevated troponin 171 high-sensitivity, BNP 4400, very low TSH levels. ECG when the patient was in A. fib RVR showed ischemic ST depression. Since admission, he was started on diltiazem drip and heparin with conversion to normal sinus rhythm and improvement in symptoms afterwards.     Review of Systems:  Cardiac: As per HPI  General: No fever, chills  Pulmonary: As per HPI  HEENT: No visual disturbances, difficult swallowing  GI: No nausea, vomiting  Endocrine: No thyroid disease or DM  Musculoskeletal: ANTHONY x 4, no focal motor deficits  Skin: Intact, no rashes  Neuro/Psych: No headache or seizures    Past Medical History:  Past Medical History:   Diagnosis Date    Acute blood loss anemia 5/14/2017    Acute MI ([facility] [address] 4/17/2017    Basal cell carcinoma 2018    Chronic kidney disease     Colitis     Diabetes ([facility] [address]     GERD (gastroesophageal reflux disease)     Hypertension     Liver abscess 2014    Overactive bladder     Thyroid disease        Past Surgical History:  Past Surgical History:   Procedure Laterality Date    APPENDECTOMY      APPENDECTOMY  1939    ATHERECTOMY      BACK SURGERY      lumbar 1,7    CARDIAC CATHETERIZATION  2017    Dr. Toy Marquez  [address], NON-  2016    Dr Sunny Acevedo, [place]  [address]    COLONOSCOPY      CORONARY ANGIOPLASTY WITH STENT PLACEMENT Right 2017    Dr Trung Quiñonez x 2 LAD x 1 diagonal x 1 RCA    DIAGNOSTIC CARDIAC CATH LAB PROCEDURE  2017; 17    Dr. Alfie Weathers Bilateral     LIVER SURGERY      SKIN BIOPSY      ear    TOTAL KNEE ARTHROPLASTY Bilateral        Family History:  History reviewed. No pertinent family history. Social History:  Social History     Socioeconomic History    Marital status:      Spouse name: Not on file    Number of children: Not on file    Years of education: Not on file    Highest education level: Not on file   Occupational History    Not on file   Tobacco Use    Smoking status: Former Smoker     Quit date: 1950     Years since quittin.4    Smokeless tobacco: Never Used   Vaping Use    Vaping Use: Never used   Substance and Sexual Activity    Alcohol use: No     Comment: decafe    Drug use: No    Sexual activity: Never   Other Topics Concern    Not on file   Social History Narrative    Not on file     Social Determinants of Health     Financial Resource Strain: Low Risk     Difficulty of Paying Living Expenses: Not hard at all   Food Insecurity: No Food Insecurity    Worried About [facility] in the Last Year: Never true    Alonso of Food in the Last Year: Never true   Transportation Needs: No Transportation Needs    Lack of Transportation (Medical): No    Lack of Transportation (Non-Medical):  No   Physical Activity:     Days of Exercise per Week:     Minutes of Exercise per Session:    Stress:     Feeling of Stress :    Social Connections:     Frequency of Communication with Friends and Family:     Frequency of Social Gatherings with Friends and Family:     Attends Mosque Services:     Active Member of Clubs or Organizations:     Attends Club or Organization Meetings:     Marital Status:    Intimate Partner Violence:     Fear of Current or Ex-Partner:     Emotionally Abused:     Physically Abused:     Sexually Abused:         Allergies:  No Known Allergies    Current Medications:  Current Facility-Administered Medications   Medication Dose Route Frequency Provider Last Rate Last Admin    dilTIAZem 125 mg in dextrose 5 % 125 mL infusion  5-15 mg/hr Intravenous Continuous Nicholas Theodora, DO   Held at 05/29/21 0528    aspirin chewable tablet 324 mg  324 mg Oral Once [facility],         heparin (porcine) injection 4,000 Units  4,000 Units Intravenous PRN Nicholas Shiabner, DO        heparin (porcine) injection 2,000 Units  2,000 Units Intravenous PRN Nicholas Yip, DO        heparin 25,000 units in dextrose 5% 250 mL (premix) infusion  5-30 Units/kg/hr Intravenous Continuous Nicholas Shiabner, DO 10 mL/hr at 05/29/21 0557 11.6 Units/kg/hr at 05/29/21 0557    clopidogrel (PLAVIX) tablet 75 mg  75 mg Oral Daily Zbigniew Vegas MD        atorvastatin (LIPITOR) tablet 40 mg  40 mg Oral Nightly Zbigniew Vegas MD        dilTIAZem (CARDIZEM CD) extended release capsule 120 mg  120 mg Oral Daily Zbigniew Vegas MD        gabapentin (NEURONTIN) capsule 100 mg  100 mg Oral TID Leslye Irvin MD        isosorbide mononitrate (IMDUR) extended release tablet 30 mg  30 mg Oral Daily Zbigniew Vegas MD        levothyroxine (SYNTHROID) tablet 175 mcg  175 mcg Oral QAM AC Zbigniew Vegas MD        metoprolol succinate (TOPROL XL) extended release tablet 50 mg  50 mg Oral Daily Zbigniew Vegas MD        vitamin B-12 (CYANOCOBALAMIN) tablet 1,000 mcg  1,000 mcg Oral Daily Zbigniew Vegas MD        glucose (GLUTOSE) 40 % oral gel 15 g  15 g Oral PRN Zbigniew Vegas MD        dextrose 50 % IV solution  12.5 g Intravenous PRN Alicia Dietz MD        glucagon (rDNA) injection 1 mg  1 mg Intramuscular PRN Zbigniew Vegas MD        dextrose 5 % solution  100 mL/hr Intravenous PRN Zbigniew Vegas MD        sodium chloride flush 0.9 % injection 5-40 mL  5-40 mL Intravenous 2 times per day Alicia Dietz MD        sodium chloride flush 0.9 % injection 5-40 mL  5-40 mL Intravenous PRN Zbigniew Vegas MD        0.9 % sodium chloride infusion  25 mL Intravenous PRN Zbigniew Vegas MD        promethazine (PHENERGAN) tablet 12.5 mg  12.5 mg Oral Q6H PRN Zbigniew Vegas MD        Or    ondansetron (ZOFRAN) injection 4 mg  4 mg Intravenous Q6H PRN Zbigniew Vegas MD        polyethylene glycol (GLYCOLAX) packet 17 g  17 g Oral Daily PRN Alicia Dietz MD        acetaminophen (TYLENOL) tablet 650 mg  650 mg Oral Q6H PRN Zbigniew Vegas MD        Or    acetaminophen (TYLENOL) suppository 650 mg  650 mg Rectal Q6H PRN Zbigniew Vegas MD        perflutren lipid microspheres (DEFINITY) injection 1.65 mg  1.5 mL Intravenous ONCE PRN Zbigniew eVgas MD        insulin lispro (HUMALOG) injection vial 0-6 Units  0-6 Units Subcutaneous TID WC Zbigniew Vegas MD        insulin lispro (HUMALOG) injection vial 0-3 Units  0-3 Units Subcutaneous Nightly Zbigniew Vegas MD        furosemide (LASIX) injection 40 mg  40 mg Intravenous BID Alicia Dietz MD   40 mg at 05/29/21 2399     Current Outpatient Medications   Medication Sig Dispense Refill    isosorbide mononitrate (IMDUR) 60 MG extended release tablet Take 1 tablet by mouth once daily 90 tablet 0    gabapentin (NEURONTIN) 100 MG capsule Take 1 capsule by mouth 3 times daily for 100 days.  90 capsule 1    levothyroxine (SYNTHROID) 175 MCG tablet Take 1 tablet by mouth Daily 90 tablet 0    metoprolol succinate (TOPROL XL) 50 MG extended release tablet Take 1 tablet by mouth daily 30 tablet 3    clopidogrel (PLAVIX) 75 MG tablet Take 1 tablet by mouth daily 90 tablet 0    atorvastatin (LIPITOR) 40 MG tablet Take 1 tablet by mouth nightly 90 tablet 0    metFORMIN (GLUCOPHAGE) 500 MG tablet Take 1 tablet by mouth daily (with breakfast) 90 tablet 0    dilTIAZem (CARDIZEM CD) 120 MG extended release capsule Take 1 capsule by mouth daily 90 capsule 3    Ferrous Sulfate (IRON) 325 (65 Fe) MG TABS Take by mouth      Cholecalciferol (VITAMIN D3) 125 MCG (5000 UT) TABS Take by mouth      vitamin B-12 (CYANOCOBALAMIN) 1000 MCG tablet Take 1,000 mcg by mouth daily      nitroGLYCERIN (NITROSTAT) 0.4 MG SL tablet nitroglycerin 0.4 mg sublingual tablet   Place 1 tablet by sublingual route. 25 tablet 1    acetaminophen (TYLENOL) 325 MG tablet Take 650 mg by mouth every morning (before breakfast)         Physical Exam:  /69   Pulse 68   Temp 98 °F (36.7 °C) (Oral)   Resp 18   Ht 5' 8\" (1.727 m)   Wt 190 lb (86.2 kg)   SpO2 98%   BMI 28.89 kg/m²   Wt Readings from Last 3 Encounters:   05/29/21 190 lb (86.2 kg)   05/13/21 190 lb (86.2 kg)   05/02/21 190 lb (86.2 kg)     Appearance: Awake, alert, no acute respiratory distress  Skin: Intact, no rash  Head: Normocephalic, atraumatic  Eyes: EOMI, no conjunctival erythema  Neck: Supple, no elevated JVP, no carotid bruits  Lungs: Clear to auscultation bilaterally. No wheezes, rales, or rhonchi.   Cardiac: Regular rate and rhythm, +N9B2, systolic murmurs apparent  Abdomen: Soft, nontender, +bowel sounds  Extremities: Moves all extremities x 4, no lower extremity edema  Neurologic: No focal motor deficits apparent, normal mood and affect  Peripheral Pulses: Intact posterior tibial pulses bilaterally    Laboratory Tests:  Lab Results   Component Value Date    CREATININE 2.5 (H) 05/29/2021    BUN 56 (H) 05/29/2021     05/29/2021    K 4.8 05/29/2021     05/29/2021    CO2 21 (L) 05/29/2021     Lab Results   Component Value Date    MG 1.9 12/11/2019     Lab Results   Component Value Date    WBC 18.2 (H) 05/29/2021    HGB 9.9 (L) 05/29/2021    HCT 32.2 (L) 05/29/2021    MCV 78.5 (L) 05/29/2021     05/29/2021     Lab Results   Component Value Date    ALT 11 05/02/2021    AST 32 05/02/2021    ALKPHOS 150 (H) 05/02/2021    BILITOT <0.2 05/02/2021     Lab Results   Component Value Date    CKTOTAL 57 06/08/2017    CKMB 2.9 06/08/2017    TROPONINI 0.01 05/02/2021    TROPONINI 0.64 (H) 12/12/2019    TROPONINI 0.62 (H) 12/12/2019     Lab Results   Component Value Date    INR 1.1 05/29/2021    INR 1.1 05/02/2021    INR 1.0 10/08/2018    PROTIME 12.6 (H) 05/29/2021    PROTIME 12.8 (H) 05/02/2021    PROTIME 11.4 10/08/2018     Lab Results   Component Value Date    TSH <0.010 (L) 05/29/2021     Lab Results   Component Value Date    LABA1C 6.0 05/13/2021     No results found for: EAG  Lab Results   Component Value Date    CHOL 120 05/07/2018    CHOL 121 02/02/2018    CHOL 183 04/17/2017     Lab Results   Component Value Date    TRIG 177 (H) 05/07/2018    TRIG 199 (H) 02/02/2018    TRIG 438 (H) 04/17/2017     Lab Results   Component Value Date    HDL 40 05/07/2018    HDL 37 02/02/2018    HDL 28 04/17/2017     Lab Results   Component Value Date    LDLCALC 45 05/07/2018    [address] 44 02/02/2018    LDLCALC - (AA) 04/17/2017     Lab Results   Component Value Date    LABVLDL 35 05/07/2018    LABVLDL 40 02/02/2018    LABVLDL - (AA) 04/17/2017     No results found for: CHOLHDLRATIO  Recent Labs     05/29/21  0434   PROBNP 4,473*       ASSESSMENT / PLAN:  43-year-old male with medical history of coronary artery disease post PCI, preserved ejection fraction, diabetes mellitus, chronic kidney disease, hypothyroidism, previous smoking, anemia of chronic disease, DNR CODE STATUS, left ventricular hypertrophy presents with shortness of breath and chest discomfort found to be in atrial fibrillation rapid ventricular response. Recommendations  Continue his home metoprolol succinate 50 mg daily. I recommend discontinuing Plavix and starting apixaban 2.5 mg twice daily. Patient would like to think about it. Awaiting his decision. Hold isosorbide for now given his low normal blood pressure. Continue atorvastatin. Hold levothyroxine. Consult endocrinology. Elevated troponin is probably due to type II MI. Patient does not look to be in congestive heart failure. Echocardiogram shows normal biventricular function and no significant valvular abnormalities. Discontinue diuresis. Thank you for allowing me to participate in your patient's care. Please feel free to contact me if you have any questions or concerns.     Julieta Garcia MD  [facility]) Cardiology

## 2021-05-29 NOTE — ED NOTES
Report called, belongings with patient. Son at bedside and aware of plan.       Ran Fernando RN  05/29/21 0597

## 2021-05-29 NOTE — ED NOTES
[facility] ([facility])- [phone]     Sharmaine Bashir Day  05/29/21 7211

## 2021-05-29 NOTE — ED NOTES
Bed: 03  Expected date:   Expected time:   Means of arrival:   Comments:  rhona Mcgovern RN  05/29/21 9268

## 2021-05-29 NOTE — ED PROVIDER NOTES
Patient is a 79 y/o male who presents to the ED with shortness of breath. Patient states that he has been short of breath for the past 10 years. He states that it has gradually worsened over the past 3-4 days. He denies any recent fever or cough. He does report left sided chest pain which occurs only when he coughs or sneezes. He denies any current chest pain. He states that he is on a blood thinner, but he does not remember which one. Review of Systems   Constitutional: Negative for chills and fever. HENT: Negative for ear pain, sinus pressure and sore throat. Eyes: Negative for pain, discharge and redness. Respiratory: Positive for shortness of breath. Negative for cough and wheezing. Cardiovascular: Positive for chest pain. Gastrointestinal: Negative for abdominal pain, diarrhea, nausea and vomiting. Genitourinary: Negative for dysuria and frequency. Musculoskeletal: Negative for arthralgias and back pain. Skin: Negative for rash and wound. Neurological: Negative for weakness and headaches. Hematological: Negative for adenopathy. All other systems reviewed and are negative. Physical Exam  Vitals and nursing note reviewed. Constitutional:       General: He is not in acute distress. HENT:      Head: Normocephalic and atraumatic. Mouth/Throat:      Mouth: Mucous membranes are moist.   Eyes:      Pupils: Pupils are equal, round, and reactive to light. Cardiovascular:      Rate and Rhythm: Regular rhythm. Tachycardia present. Heart sounds: No murmur heard. Pulmonary:      Effort: Pulmonary effort is normal. No respiratory distress. Breath sounds: Normal breath sounds. No stridor. No decreased breath sounds, wheezing, rhonchi or rales. Chest:      Chest wall: No tenderness. Abdominal:      General: Bowel sounds are normal.      Palpations: Abdomen is soft. Tenderness: There is no abdominal tenderness. There is no guarding.    Musculoskeletal: General: Normal range of motion. Cervical back: Normal range of motion and neck supple. Right lower leg: No edema. Left lower leg: No edema. Skin:     General: Skin is warm and dry. Findings: No rash. Neurological:      Mental Status: He is alert and oriented to person, place, and time. Procedures     [facility]          [id number]. Spoke with Dr. Haile Valentin (Cardiology). Discussed case. They agree with the plan and will provide consultation.         --------------------------------------------- PAST HISTORY ---------------------------------------------  Past Medical History:  has a past medical history of Acute blood loss anemia, Acute MI ([facility] Basal cell carcinoma, Chronic kidney disease, Colitis, Diabetes ([facility] GERD (gastroesophageal reflux disease), Hypertension, Liver abscess, Overactive bladder, and Thyroid disease. Past Surgical History:  has a past surgical history that includes joint replacement (Bilateral); Colonoscopy; Appendectomy; skin biopsy; back surgery; Circumcision, non- (2016); Cholecystectomy; Liver surgery; Cardiac catheterization (2017); Coronary angioplasty with stent (Right, 2017); Appendectomy (1939); Atherectomy (); Total knee arthroplasty (Bilateral, ); Circumcision, non- (); Cardiac catheterization (); and Diagnostic Cardiac Cath Lab Procedure (2017; 17). Social History:  reports that he quit smoking about 71 years ago. He has never used smokeless tobacco. He reports that he does not drink alcohol and does not use drugs. Family History: family history is not on file. The patients home medications have been reviewed. Allergies: Patient has no known allergies.     -------------------------------------------------- RESULTS -------------------------------------------------    LABS:  Results for orders placed or performed during the hospital encounter of 21   COVID-19, Rapid    Specimen: Nasopharyngeal Swab   Result Value Ref Range    SARS-CoV-2, NAAT Not Detected Not Detected   Basic metabolic panel   Result Value Ref Range    Sodium 141 132 - 146 mmol/L    Potassium 4.8 3.5 - 5.0 mmol/L    Chloride 107 98 - 107 mmol/L    CO2 21 (L) 22 - 29 mmol/L    Anion Gap 13 7 - 16 mmol/L    Glucose 112 (H) 74 - 99 mg/dL    BUN 56 (H) 6 - 23 mg/dL    CREATININE 2.5 (H) 0.7 - 1.2 mg/dL    GFR Non-African American 24 >=60 mL/min/1.73    GFR African American 29     Calcium 8.7 8.6 - 10.2 mg/dL   CBC   Result Value Ref Range    WBC 18.0 (H) 4.5 - 11.5 E9/L    RBC 4.51 3.80 - 5.80 E12/L    Hemoglobin 10.9 (L) 12.5 - 16.5 g/dL    Hematocrit 35.3 (L) 37.0 - 54.0 %    MCV 78.3 (L) 80.0 - 99.9 fL    MCH 24.2 (L) 26.0 - 35.0 pg    MCHC 30.9 (L) 32.0 - 34.5 %    RDW 14.9 11.5 - 15.0 fL    Platelets 647 231 - 905 E9/L    MPV 9.8 7.0 - 12.0 fL   Troponin   Result Value Ref Range    Troponin, High Sensitivity 155 (H) 0 - 11 ng/L   Brain Natriuretic Peptide   Result Value Ref Range    Pro-BNP 4,473 (H) 0 - 450 pg/mL   Lactic Acid, Plasma   Result Value Ref Range    Lactic Acid 1.7 0.5 - 2.2 mmol/L   Protime-INR   Result Value Ref Range    Protime 12.6 (H) 9.3 - 12.4 sec    INR 1.1    APTT   Result Value Ref Range    aPTT 29.6 24.5 - 35.1 sec   EKG 12 Lead   Result Value Ref Range    Ventricular Rate 151 BPM    Atrial Rate 151 BPM    P-R Interval 132 ms    QRS Duration 74 ms    Q-T Interval 248 ms    QTc Calculation (Bazett) 393 ms    R Axis -19 degrees    T Axis 84 degrees   EKG 12 Lead   Result Value Ref Range    Ventricular Rate 77 BPM    Atrial Rate 214 BPM    QRS Duration 80 ms    Q-T Interval 370 ms    QTc Calculation (Bazett) 418 ms    T Axis 68 degrees       RADIOLOGY:  XR CHEST PORTABLE    (Results Pending)       EKG: This EKG is signed and interpreted by me.     Rate: 151  Rhythm: Atrial fibrillation and Rapid ventricular response  Interpretation: atrial fibrillation (chronic) and inferolateral ST depressions (non-ST elevated myocardial infarction) ([facility] [facility]    2. Atrial fibrillation with rapid ventricular response ([facility] [address]    3. Chronic renal impairment, unspecified CKD stage        Disposition:  Patient's disposition: Admit to [facility]  Patient's condition is stable. Please note that the withdrawal or failure to initiate urgent interventions for this patient would likely result in a life threatening deterioration or permanent disability. Accordingly this patient received 30 minutes of critical care time, excluding separately billable procedures.          [address] [facility],   05/29/21 0038

## 2021-05-30 NOTE — PROGRESS NOTES
Patient seen and examined. He was admitted early this morning with atrial fibrillation with rapid ventricular response. Initially there was concern for NSTEMI given elevated troponin, and cardiology states this is likely type II MI from demand ischemia. Currently on heparin drip. He was on diltiazem drip earlier today, but discontinued he is now on oral diltiazem. Cardiology reviewed the echocardiogram which shows normal ejection fraction. Cardiology discussed the possibility of switching to apixaban 2.5 mg twice daily and discontinuing Plavix. Patient has been taking Plavix for quite some time, and would like to think about his decision. 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 Plan for possible discharge home on oral diltiazem, and either continuing home Plavix or switching to Eliquis.

## 2021-05-30 NOTE — PROGRESS NOTES
After multiple attempts by nursing staff and lab staff, an aptt is unable to be obtained, SWAT nurse called, awaiting lab to be drawn

## 2021-05-30 NOTE — DISCHARGE SUMMARY
[facility] Physician Discharge Summary       Rolan Bui MD  [address] [facility] [phone]    Call in 2 days      Rebel Pendleton MD  [address] [facility], [address] [place] [address]  [phone]    Call in 2 days        Activity level: Resume normal activity    Diet: DIET GENERAL; Low Sodium (2 GM)    Labs: Routine labs per primary care physician    Condition at discharge: Stable    Dispo: Home      Patient ID:  Ilana Mijares  [id number]  80 y.o.  4/20/1928    Admit date: 5/29/2021    Discharge date and time:  5/30/2021  10:42 AM    Admission Diagnoses: Principal Problem:    Atrial fibrillation with RVR ([address]  Active Problems:    Acquired hypothyroidism    Essential hypertension    Type 2 diabetes mellitus with kidney complication, without long-term current use of insulin (HCC)    CKD (chronic kidney disease) stage 4, GFR 15-29 ml/min (HCC)    NSTEMI (non-ST elevated myocardial infarction) ([address]  Resolved Problems:    * No resolved hospital problems. *      Discharge Diagnoses: Principal Problem:    Atrial fibrillation with RVR ([address]  Active Problems:    Acquired hypothyroidism    Essential hypertension    Type 2 diabetes mellitus with kidney complication, without long-term current use of insulin (HCC)    CKD (chronic kidney disease) stage 4, GFR 15-29 ml/min (HCC)    NSTEMI (non-ST elevated myocardial infarction) ([address]  Resolved Problems:    * No resolved hospital problems. *      Consults:  IP CONSULT TO CARDIOLOGY  IP CONSULT TO HEART FAILURE NURSE/COORDINATOR  IP CONSULT TO DIETITIAN  IP CONSULT TO CARDIOLOGY    Procedures: None    Hospital Course:  This is a 57-year-old male with history significant for coronary artery disease, hypothyroidism on levothyroxine, essential hypertension, type 2 diabetes mellitus on Metformin, stage 4 chronic kidney disease that was admitted to the hospital for atrial fibrillation with rapid ventricular response. Initially there was concern for heart failure as well as possible NSTEMI given dyspnea on exertion, chest pain, and mildly elevated troponin. Cardiology was consulted and states that his symptoms are likely due to atrial fibrillation. Diuretics were discontinued as clinically he did not appear volume overloaded after admission. For his atrial fibrillation, he was initially treated with diltiazem drip and this was weaned off after he spontaneously converted to sinus rhythm. He was continued on his home diltiazem and metoprolol succinate. He was also placed on a heparin drip on admission, and home Plavix was held. Cardiology and I discussed with the patient the possibility of switching from Plavix to oral Eliquis for stroke prevention. 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 Stable for discharge home with outpatient follow-up with his primary care physician and cardiologist.  Discussed with cardiology prior to discharge. Regarding hypothyroidism, TSH was checked and was found to be less than 0.01. Levothyroxine dose reduced to 137 mcg daily. He will need repeat TSH in 2 to 3 months as an outpatient.     Discharge Exam:  Vitals:    05/29/21 1945 05/30/21 0400 05/30/21 0430 05/30/21 0826   BP: (!) 158/72 (!) 164/74  (!) 181/82   Pulse: 78 80  79   Resp: 24 20  18   Temp: 97.9 °F (36.6 °C) 98.4 °F (36.9 °C)  98.9 °F (37.2 °C)   TempSrc: Axillary Axillary  Oral   SpO2: 95% 94%  95%   Weight:   192 lb 11.2 oz (87.4 kg)    Height:           General Appearance: alert and oriented to person, place and time, well developed and well- nourished, in no acute distress  Skin: warm and dry, no rash or erythema  Head: normocephalic and atraumatic  Eyes: pupils equal, round, and reactive to light, extraocular eye movements intact, conjunctivae normal  ENT: Sternal nose nares normal.  Oral mucosa moist without signs of thrush. Neck: supple and non-tender without mass, no thyromegaly or thyroid nodules, no cervical lymphadenopathy  Pulmonary/Chest: clear to auscultation bilaterally- no wheezes, rales or rhonchi, normal air movement, no respiratory distress  Cardiovascular: Giller rate and rhythm, S1, S2 with grade 1/6 systolic murmur. Abdomen: soft, non-tender, non-distended, normal bowel sounds, no masses or organomegaly  Extremities: no cyanosis, clubbing or edema  Musculoskeletal: normal range of motion, no joint swelling, deformity or tenderness  Neurologic: Cranial nerves II through XII grossly intact. Speech normal.  I/O last 3 completed shifts: In: 120 [P.O.:120]  Out: 820 [Urine:820]  I/O this shift: In: 492.1 [P.O.:420; I.V.:72.1]  Out: 125 [Urine:125]      LABS:  Recent Labs     05/29/21  0434 05/29/21  1248 05/29/21  1632     --   --    K 4.8  --   --      --   --    CO2 21*  --   --    BUN 56*  --   --    CREATININE 2.5*  --   --    GLUCOSE 112* 107 126   CALCIUM 8.7  --   --        Recent Labs     05/29/21  0434 05/29/21  0542   WBC 18.0* 18.2*   RBC 4.51 4.10   HGB 10.9* 9.9*   HCT 35.3* 32.2*   MCV 78.3* 78.5*   MCH 24.2* 24.1*   MCHC 30.9* 30.7*   RDW 14.9 14.8    294   MPV 9.8 10.0         Imaging:  XR CHEST PORTABLE    Result Date: 5/29/2021  EXAMINATION: ONE XRAY VIEW OF THE CHEST 5/29/2021 5:42 am COMPARISON: 05/03/2021 HISTORY: ORDERING SYSTEM PROVIDED HISTORY: chest pain, shortness of breath TECHNOLOGIST PROVIDED HISTORY: Reason for exam:->chest pain, shortness of breath FINDINGS: Heart size is normal. Pulmonary vasculature is not congested. Mediastinal and hilar contours are unchanged. The lungs are clear. The pleural spaces are clear. There is no pneumothorax. There is no acute abnormality seen.          Patient Instructions:   Current Discharge Medication List      CONTINUE these medications which have NOT CHANGED    Details   isosorbide mononitrate (IMDUR) 60 MG extended release tablet Take 1 tablet by mouth once daily  Qty: 90 tablet, Refills: 0      gabapentin (NEURONTIN) 100 MG capsule Take 1 capsule by mouth 3 times daily for 100 days. Qty: 90 capsule, Refills: 1      metoprolol succinate (TOPROL XL) 50 MG extended release tablet Take 1 tablet by mouth daily  Qty: 30 tablet, Refills: 3      clopidogrel (PLAVIX) 75 MG tablet Take 1 tablet by mouth daily  Qty: 90 tablet, Refills: 0      atorvastatin (LIPITOR) 40 MG tablet Take 1 tablet by mouth nightly  Qty: 90 tablet, Refills: 0      metFORMIN (GLUCOPHAGE) 500 MG tablet Take 1 tablet by mouth daily (with breakfast)  Qty: 90 tablet, Refills: 0      dilTIAZem (CARDIZEM CD) 120 MG extended release capsule Take 1 capsule by mouth daily  Qty: 90 capsule, Refills: 3      Ferrous Sulfate (IRON) 325 (65 Fe) MG TABS Take by mouth      Cholecalciferol (VITAMIN D3) 125 MCG (5000 UT) TABS Take by mouth      vitamin B-12 (CYANOCOBALAMIN) 1000 MCG tablet Take 1,000 mcg by mouth daily      nitroGLYCERIN (NITROSTAT) 0.4 MG SL tablet nitroglycerin 0.4 mg sublingual tablet   Place 1 tablet by sublingual route. Qty: 25 tablet, Refills: 1      acetaminophen (TYLENOL) 325 MG tablet Take 650 mg by mouth every morning (before breakfast)         STOP taking these medications       levothyroxine (SYNTHROID) 175 MCG tablet Comments:   Reason for Stopping:                 Note that greater than 30 minutes was spent in preparing discharge papers, discussing discharge with patient, medication review, etc.    NOTE: This report was transcribed using voice recognition software. Every effort was made to ensure accuracy; however, inadvertent computerized transcription errors may be present.      Signed:  Electronically signed by Xochilt Gamez DO on 5/30/2021 at 10:42 AM

## 2021-05-30 NOTE — PROCEDURES
[address]                                 ECHOCARDIOGRAM    PATIENT NAME: Fabienne Farrell                     :        1928  MED REC NO:   [id number]                            ROOM:  ACCOUNT NO:   [de-identified]                           ADMIT DATE: 2021  PROVIDER:     Mirta Celeste MD    ECHOCARDIOGRAPHER:  Olivia Baxter. INDICATIONS:  Non-ST elevation MI.    2-D MEASUREMENTS:  Left ventricular outflow tract 2.0 cm. Right  ventricle diastole 3.1. Left ventricle diastole 4.4, systole 3.0. Septal and posterior wall thickness of the left ventricle is 1.4 cm. Left atrial dimension 4.4 cm. Right atrial area 20 cm2. Aortic root  diameter 3.9 cm. Aortic valve cusp separation 1.9 cm. IMPRESSION:  1. The right ventricle is mildly dilated as is the left and right  atria. The left ventricle and aortic root are still within normal  limits. 2.  The left ventricle shows concentric left ventricular hypertrophy at  1.4 cm. Systolic function is well preserved. Ejection fraction 60%. No focal wall motion abnormality is seen. There is diastolic filling  dysfunction stage I.  3.  The mitral valve shows mild mitral annular calcification. Maximal  gradient 7.8 mmHg. Mitral valve area by pressure half time 5 cm2. There is no significant mitral stenosis. There is 1+ mitral  regurgitation. 4.  The aortic valve is sclerotic but the leaflets open well. Maximal  gradient 4.7 mmHg. No significant aortic stenosis nor insufficiency is  seen. Aortic valve area 2.4 cm2.  5.  There is no pulmonic stenosis nor insufficiency. There is trace  tricuspid regurgitation. Pulmonary pressure is estimated at 18 mmHg. 6.  There is no significant pericardial effusion nor any definite  intracavitary mass or thrombus or shunt identified.         Earline Beauchamp MD    D: 2021 23:07:36       T: 2021 23:18:21     [email or web address]  Job#: [id number]     Doc#: [id number]    CC:  [facility],        MD Madhavi Lubin MD

## 2021-06-01 NOTE — CARE COORDINATION
[facility] Transitions Initial Follow Up Call    Call within 2 business days of discharge: Yes    Patient: Jose Ortega Patient : 1928   MRN: [id number]  Reason for Admission: AFib with RVR  Discharge Date: 21 RARS: Readmission Risk Score: 15      Last Discharge [facility]       Complaint Diagnosis Description Type Department Provider    21 Chest Pain; Shortness of Breath NSTEMI (non-ST elevated myocardial infarction) ([facility] . .. ED to Hosp-Admission (Discharged) (ADMITTED) [facility] [address] [address] [place] [address]; Jayde Rodriguez... Transitions of Care Initial Call    Was this an external facility discharge? No Discharge Facility: N/A    Challenges to be reviewed by the provider   Additional needs identified to be addressed with provider: No  none             Method of communication with provider : none      Advance Care Planning:   Does patient have an Advance Directive:  reviewed and current. Was this a readmission? No  Patient stated reason for admission: Shortness of breath  Patients top risk factors for readmission: level of motivation, medical condition-DM, CKD, and Sepsis and polypharmacy    Care Transition Nurse (CTN) contacted the patient by telephone to perform post hospital discharge assessment. Verified name and  with patient as identifiers. Provided introduction to self, and explanation of the CTN role. CTN reviewed discharge instructions, medical action plan and red flags with patient who verbalized understanding. Patient given an opportunity to ask questions and does not have any further questions or concerns at this time. Were discharge instructions available to patient? Yes. Reviewed appropriate site of care based on symptoms and resources available to patient including: PCP, Specialist, Urgent care clinics and When to call 911. The patient agrees to contact the PCP office for questions related to their healthcare.      Medication reconciliation was performed with patient, who verbalizes understanding of administration of home medications. Advised obtaining a 90-day supply of all daily and as-needed medications. Covid Risk Education     Educated patient about risk for severe COVID-19 due to risk factors according to CDC guidelines. CTN reviewed discharge instructions, medical action plan and red flag symptoms with the patient who verbalized understanding. Discussed Patient was given an opportunity to verbalize any questions and concerns and agrees to contact CTN or health care provider for questions related to their healthcare. Reviewed and educated patient on any new and changed medications related to discharge diagnosis. Was patient discharged with a pulse oximeter? No Discussed and confirmed pulse oximeter discharge instructions and when to notify provider or seek emergency care. CTN provided contact information. Plan for follow-up call in 5-7 days based on severity of symptoms and risk factors. Non-face-to-face services provided:  Scheduled appointment with PCP-CTN confirmed with patient he is scheduled to follow up with Dr. Pamela Carter (PCP) on 6/14/21 and ada wanting to be seen sooner. Scheduled appointment with Specialist-CTN confirmed with patient he will call and schedue follow up appt with Dr. Kushal Weinberg (cardiology).    Obtained and reviewed discharge summary and/or continuity of care documents    Care Transitions 24 Hour Call    Schedule Follow Up Appointment with PCP: Declined  Do you have any ongoing symptoms?: Yes  Patient-reported symptoms: Shortness of Breath  Do you have a copy of your discharge instructions?: Yes  Do you have all of your prescriptions and are they filled?: Yes  Have you been contacted by a [facility] Pharmacist?: No  Have you scheduled your follow up appointment?: No  Were you discharged with any Home Care or Post Acute Services: No  Do you feel like you have everything you need to keep you well at home?: Yes  Care Transitions Interventions       Spoke with patient today 6/1/21 for TCM/hospital discharge (Low Risk) follow up for AFib. Patient complains of ongoing shortness of breath with exertion but denies being on home oxygen. Denies any chest pain, chest discomfort or palpitations. Noted Echo complete was performed on 5/29/21 that showed EF of 60%. Patient declines full med review with this CTN saying DTR manages meds who is not available at this time. Denies any new meds being ordered on discharge. 1111F code NOT entered. CTN will make referral to clinical pharmacist.     Patient complains of arthritis pain to right wrist which is chronic. Declines wanting to be seen sooner by than next ov on 6/14/21 when CTN offered. States he will follow up with Dr. Sherwin Curtis (cardiology). Denies any other complaints or concerns at this time. CTN will continue to follow for Care Transition.     Follow Up  Future Appointments   Date Time Provider Francis Kyle   6/14/2021  1:30 PM Karla Gomez MD [facility]       IVETT Churchill

## 2021-06-02 NOTE — TELEPHONE ENCOUNTER
----- Message from IVETT Santiago sent at 6/1/2021  2:58 PM EDT -----  Regarding: pharmacy referral to complete medication review  Hello,     Patient discharged from [facility] 5/30/21 for AFib. Can you reach out to patient to complete medication review? Noted new meds ordered on discharge.      Thanks,    Bernard

## 2021-06-02 NOTE — TELEPHONE ENCOUNTER
Received a referral:  from IVETT Hodge to review patients medications. Called patient to schedule a time to speak with a pharmacist over the telephone. Spoke to patient and advised them of the above message. He advised me to call daughter, Yin Rodriguez. Called Shanna and set up appt for her to be at patients house tomorrow at [facility] to review meds. Patient not found in [facility]    Gayla Fitch [facility].    [address] [facility], toll free: [phone], option 7

## 2021-06-03 NOTE — TELEPHONE ENCOUNTER
Patient's [facility] and [facility] is having SOB and doesn't look very well also is very fatigued. She also stated they have been adjusting medications. I set appt tomorrow at 11:00 am due to urgent conditions.

## 2021-06-03 NOTE — TELEPHONE ENCOUNTER
Jailene Cervantes MD, medication review completed with patient:  - His daughter said that he was supposed to be off Metformin, but it was still listed on his med list. I agree it should be stopped due to his kidney function. I marked as not taking, please remove at your upcoming visit if you agree. - Patient is currently finishing a Medrol dosepack  - Only medication that was adjusted was his Levothyroxine-  It went from 175-->137mcg. TSH was 0.01    Patient has appointment with you 6/4/21. Please discuss with patient at appointment or route back to me to discuss with patient. See note below for complete details. Thank you,  ROSAS Pugh, PharmD, [address]  Direct: [phone]  Department, toll free: [phone], option 7      =======================================================    CLINICAL PHARMACY NOTE - Medication Review  Patient outreach to review medications - Spoke with daughter. Shanna      SUBJECTIVE/OBJECTIVE:   Jose Heck is a 80 y.o. male referred to a clinical pharmacy specialist by care coordination    Patients daughter returned call. Able to complete appointment now. Medications:  Current Outpatient Medications   Medication Sig Dispense Refill    levothyroxine (SYNTHROID) 137 MCG tablet Take 1 tablet by mouth daily 30 tablet 0    isosorbide mononitrate (IMDUR) 60 MG extended release tablet Take 1 tablet by mouth once daily 90 tablet 0    gabapentin (NEURONTIN) 100 MG capsule Take 1 capsule by mouth 3 times daily for 100 days.  90 capsule 1    metoprolol succinate (TOPROL XL) 50 MG extended release tablet Take 1 tablet by mouth daily 30 tablet 3    clopidogrel (PLAVIX) 75 MG tablet Take 1 tablet by mouth daily 90 tablet 0    atorvastatin (LIPITOR) 40 MG tablet Take 1 tablet by mouth nightly 90 tablet 0    dilTIAZem (CARDIZEM CD) 120 MG extended release capsule Take 1 capsule by mouth daily 90 capsule 3    Ferrous Sulfate (IRON) 325 (65 Fe) MG TABS Take by mouth      Cholecalciferol (VITAMIN D3) 125 MCG (5000 UT) TABS Take by mouth      vitamin B-12 (CYANOCOBALAMIN) 1000 MCG tablet Take 1,000 mcg by mouth daily      nitroGLYCERIN (NITROSTAT) 0.4 MG SL tablet nitroglycerin 0.4 mg sublingual tablet   Place 1 tablet by sublingual route. 25 tablet 1    acetaminophen (TYLENOL) 325 MG tablet Take 650 mg by mouth every morning (before breakfast)      metFORMIN (GLUCOPHAGE) 500 MG tablet Take 1 tablet by mouth daily (with breakfast) (Patient not taking: Reported on 6/3/2021) 90 tablet 0     No current facility-administered medications for this visit. Additional Medications (including OTC/Herbal Supplements):  · Patient has taken 2 rounds of a medrol dosepack recently. He is currently finishing the second. Informed her to bring this up to his pcp. It is not recommended to continue using these. Allergies:   No Known Allergies    Pertinent Labs/Vitals:  BP Readings from Last 3 Encounters:   05/30/21 (!) 181/82   05/13/21 (!) 110/50   05/02/21 129/67     Lab Results   Component Value Date    LABMICR <12.0 07/16/2018     Lab Results   Component Value Date    LABA1C 6.5 (H) 05/29/2021    LABA1C 6.0 05/13/2021    LABA1C 5.9 11/12/2020     Lab Results   Component Value Date    CHOL 120 05/07/2018    TRIG 177 (H) 05/07/2018    HDL 40 05/07/2018    LDLCALC 45 05/07/2018     ALT   Date Value Ref Range Status   05/02/2021 11 0 - 40 U/L Final     Comment:     Specimen is moderately Hemolyzed. Result may be artificially increased. AST   Date Value Ref Range Status   05/02/2021 32 0 - 39 U/L Final     Comment:     Specimen is moderately Hemolyzed. Result may be artificially increased. The ASCVD Risk score (Irlanda Ledezma, et al., 2013) failed to calculate for the following reasons:     The 2013 ASCVD risk score is only valid for ages 36 to 78    The patient has a prior MI or stroke diagnosis     Lab Results   Component Value Date    CREATININE 2.5 (H) 2021     eGFR: 24 mL/min/1.73 m^2    Social History:   Social History     Tobacco Use    Smoking status: Former Smoker     Quit date: 1950     Years since quittin.4    Smokeless tobacco: Never Used   Substance Use Topics    Alcohol use: No     Comment: decafe       Immunizations:   Most Recent Immunizations   Administered Date(s) Administered    COVID-19, Moderna, PF, 100mcg/0.5mL 2021    Influenza A (W8B3-01) Vaccine PF IM 2009    Influenza Vaccine, unspecified formulation 10/01/2013    Influenza Virus Vaccine 10/01/2008    Influenza, High Dose (Fluzone 65 yrs and older) 2018    Influenza, Quadv, adjuvanted, 65 yrs +, IM, PF (Fluad) 2020    Influenza, Triv, inactivated, subunit, adjuvanted, IM (Fluad 65 yrs and older) 2019    Pneumococcal Conjugate 13-valent ([id number]) 2019    Pneumococcal Polysaccharide ([id number]) 2011    Unknown Immunization 2020       ASSESSMENT/PLAN:   - General Assessment:   · Adherence: Mostly adherent  · Cost: not a concern  · Current pharmacy/pharmacies:   · Drug interactions: The following clinically significant interactions were identified via [email or web address] Interaction Analysis as category D or higher: Levothyroxine and Iron- instructed pt to separate doses by at least 2 hours and to make sure that the timing of Levothyroxine is consistent as possible in regards to food/meds. · Renal dosing: According to [employer], the following should have renal adjustment: Metformin- Per patients daughter, he was instructed to stop this medication all together so likely not an issue  · Daughter reported that she was instructed to only give him Diltiazem on a prn basis if BP was elevated. Noted in previous note by pcp to hold if BP less than 110/70  · Daughter said that he was looking for new cardiologist.    - Diabetes:    Glycemic goal: <7.0%.  Stable and at blood glucose

## 2021-06-04 NOTE — PROGRESS NOTES
OFFICE PROGRESS NOTE      SUBJECTIVE:        Patient ID:   Ezequiel Pabon is a 80 y.o. male who presents for   Chief Complaint   Patient presents with    Follow-up     patient was seen in ED for afib 5/29/2021-5/30/2021    Fatigue     all day long since being discharged from ED     Shortness of Breath    Foot Swelling     X Past several day's     Other     Patient has not bathed or any  desire to do anything at home. Patient would like a HHA    Insomnia     pt stated he is unable to sleep and feels like someone is waking him up in the middle of night. HPI:     WAS HOSPITALIZED AT [place] [id number] LAST WEEKEND FOR SHORTNESS OF BREATH. WAS DISCHARGED AFTER EVALUATION. STILL FEELS SHORT OF BREATH. USING OXYGEN IN THE HOSPITAL HELPED. [address]. EATING GOOD. GOOD. NO  EXERCISE. AMBULATING WITH WHEEL CHAIR. TAKING MEDICATIONS REGULARLY. Prior to Admission medications    Medication Sig Start Date End Date Taking? Authorizing Provider   isosorbide mononitrate (IMDUR) 30 MG extended release tablet Take 1 tablet by mouth daily 6/4/21  Yes Yannick Tapia MD   levothyroxine (SYNTHROID) 137 MCG tablet Take 1 tablet by mouth daily 5/30/21  Yes Angel Hays DO   gabapentin (NEURONTIN) 100 MG capsule Take 1 capsule by mouth 3 times daily for 100 days.  5/13/21 8/21/21 Yes Yannick Tapia MD   metoprolol succinate (TOPROL XL) 50 MG extended release tablet Take 1 tablet by mouth daily 5/13/21  Yes Yannick Tapia MD   clopidogrel (PLAVIX) 75 MG tablet Take 1 tablet by mouth daily 5/13/21  Yes Yannick Tapia MD   atorvastatin (LIPITOR) 40 MG tablet Take 1 tablet by mouth nightly 5/13/21  Yes Yannick Tapia MD   dilTIAZem (CARDIZEM CD) 120 MG extended release capsule Take 1 capsule by mouth daily 5/13/21  Yes Yannick Tapia MD   Ferrous Sulfate (IRON) 325 (65 Fe) MG TABS Take by mouth   Yes Historical Provider, MD Cholecalciferol (VITAMIN D3) 125 MCG (5000 UT) TABS Take by mouth   Yes Historical Provider, MD   vitamin B-12 (CYANOCOBALAMIN) 1000 MCG tablet Take 1,000 mcg by mouth daily   Yes Historical Provider, MD   nitroGLYCERIN (NITROSTAT) 0.4 MG SL tablet nitroglycerin 0.4 mg sublingual tablet   Place 1 tablet by sublingual route. 19  Yes Hamilton Buchanan MD   acetaminophen (TYLENOL) 325 MG tablet Take 650 mg by mouth every morning (before breakfast)   Yes Historical Provider, MD   methylPREDNISolone (MEDROL DOSEPACK) 4 MG tablet TAKE BY MOUTH AS DIRECTED ON INSIDE OF PACKAGE 21   Historical Provider, MD     Social History     Socioeconomic History    Marital status:      Spouse name: None    Number of children: None    Years of education: None    Highest education level: Professional school degree (e.g., MD, DDS, DVM, LUCILLE)   Occupational History    None   Tobacco Use    Smoking status: Former Smoker     Quit date: 1950     Years since quittin.4    Smokeless tobacco: Never Used   Vaping Use    Vaping Use: Never used   Substance and Sexual Activity    Alcohol use: No     Comment: decafe    Drug use: No    Sexual activity: Never   Other Topics Concern    None   Social History Narrative    None     Social Determinants of Health     Financial Resource Strain: Low Risk     Difficulty of Paying Living Expenses: Not hard at all   Food Insecurity: No Food Insecurity    Worried About Running Out of Food in the Last Year: Never true    Alonso of Food in the Last Year: Never true   Transportation Needs: No Transportation Needs    Lack of Transportation (Medical): No    Lack of Transportation (Non-Medical):  No   Physical Activity:     Days of Exercise per Week:     Minutes of Exercise per Session:    Stress:     Feeling of Stress :    Social Connections:     Frequency of Communication with Friends and Family:     Frequency of Social Gatherings with Friends and Family:     Attends Orthodoxy Services:     Active Member of Clubs or Organizations:     Attends Club or Organization Meetings:     Marital Status:    Intimate Partner Violence:     Fear of Current or Ex-Partner:     Emotionally Abused:     Physically Abused:     Sexually Abused:        I have reviewed Brandts allergies, medications, problem list, medical, social and family history and have updated as needed in the electronic medical record  Review Of Systems:    Skin: no abnormal pigmentation, rash, scaling, itching, masses, hair or nail changes  Eyes: no blurring, diplopia, or eye pain  Ears/Nose/Throat: no hearing loss, tinnitus, vertigo, nosebleed, nasal congestion, rhinorrhea, sore throat  Respiratory: no cough, pleuritic chest pain, dyspnea, or wheezing  Cardiovascular: no chest pain, angina, dyspnea on exertion, orthopnea, PND, palpitations, or claudication  Gastrointestinal: no nausea, vomiting, heartburn, diarrhea, constipation, bloating,  abdominal pain, or blood per rectum. Appetite is good  Genitourinary: no urinary urgency, frequency, dysuria, nocturia, hesitancy, or incontinence  Musculoskeletal: joint pains off and on. Morning stiffness.  Ambulating well  Neurologic: no paralysis, paresis, paresthesia, seizures, tremors, or headaches  Hematologic/Lymphatic/Immunologic: no anemia, abnormal bleeding/bruising, fever, chills, night sweats, swollen glands, or unexplained weight loss  Endocrine: no heat or cold intolerance and no polyphagia, polydipsia, or polyuria        OBJECTIVE:     VS:  Wt Readings from Last 3 Encounters:   06/04/21 192 lb (87.1 kg)   05/30/21 192 lb 11.2 oz (87.4 kg)   05/13/21 190 lb (86.2 kg)     Temp Readings from Last 3 Encounters:   06/04/21 96.9 °F (36.1 °C)   05/30/21 98.9 °F (37.2 °C) (Oral)   05/13/21 96.6 °F (35.9 °C)     BP Readings from Last 3 Encounters:   06/04/21 132/62   05/30/21 (!) 181/82   05/13/21 (!) 110/50        General appearance: Alert, Awake, Oriented times 3, no distress  Skin: Warm and dry  Head: Normocephalic. No masses, lesions or tenderness noted  Eyes: Conjunctivae appear normal. PERLE  Ears: External ears normal  Nose/Sinuses: Nares normal. Septum midline. Mucosa normal. No drainage  Oropharynx: Oropharynx clear with no exudate seen  Neck: Neck supple. No jugular venous distension, lymphadenopathy or thyromegaly Trachea midline  Chest:  Normal. Movements are Normal and Equal.  Lungs: Lungs clear to auscultation bilaterally. No ronchi, crackles or wheezes  Heart: S1 S2  Regular rate and rhythm. No rub, murmur or gallop  Abdomen: Abdomen soft, non-tender. BS normal. No masses, organomegaly. Back: Grossly Normal and Equal. DTR are Normal. SLR is Normal.  Extremities: Arthritic changes are noted. Movements are limited. Pedal pulses are normal.  Musculoskeletal: Muscular strength appears intact. No joint effusion, tenderness, swelling or warmth  Neuro:  No focal motor or sensory deficits        ASSESSMENT     Patient Active Problem List    Diagnosis Date Noted    Acquired hypothyroidism     Essential hypertension     GERD (gastroesophageal reflux disease)     Colitis     NSTEMI (non-ST elevated myocardial infarction) ([facility] 05/29/2021    CKD (chronic kidney disease) stage 4, GFR 15-29 ml/min ([facility]) 08/11/2020    Atrial fibrillation with RVR ([facility] 12/11/2019        Diagnosis:     ICD-10-CM    1. Atrial fibrillation with RVR ([facility])  I48.91 DME Order for Home Oxygen as OP    CONTROLLED   2. CKD (chronic kidney disease) stage 4, GFR 15-29 ml/min ([facility])  N18.4 DME Order for Home Oxygen as OP    STABLE   3. Acquired hypothyroidism  E03.9     STABLE   4. Essential hypertension  I10     CONTRLLED   5. Bilateral leg edema  R60.0     NEW ONSET   6. Shortness of breath at rest  R06.02 DME Order for Home Oxygen as OP   7. Impaired mobility and activities of daily living  Z74.09 [address] [facility]    Z78.9        PLAN:           Patient Instructions   LOW SALT  AND LOW FAT DIET.   CONTINUE CURRENT MEDICATIONS TAKING REGULARLY. DECREASE IMDUR DOSAGE TO 30 MG. DAILY. [address] OXYGEN AT 2 LITRE/MIN AS NEEDED. REGULAR WALKING ADVISED. NEXT APPOINTMENT IN 1 MONTH. Return in about 1 month (around 7/4/2021) for FOLLOW UP VISIT. I have reviewed my findings and recommendations with Jayson Calloway.     Electronically signed by Vinicius Jackson MD on 6/4/21 at 12:00 PM EDT

## 2021-06-08 NOTE — TELEPHONE ENCOUNTER
For Pharmacy Admin Tracking Only     Recommendation Provided To: Provider: 1 via Note to Provider   Intervention Detail: Discontinued Rx: 1, reason: MARINE   Gap Closed?: Yes    Total # of Interventions Recommended: 1   Total # of Interventions Accepted: 1   Intervention Accepted By: Provider: 1   Time Spent (min): 45

## 2021-06-09 NOTE — CARE COORDINATION
[facility] Transitions Follow Up Call    2021    Patient: Isabella Recinos  Patient : 1928   MRN: [id number]  Reason for Admission: AFib with RVR  Discharge Date: 21 RARS: Readmission Risk Score: 15         Spoke with: Chriss[address] Transitions Subsequent and Final Call    Subsequent and Final Calls  Do you have any ongoing symptoms?: Yes  Onset of Patient-reported symptoms: Other  Patient-reported symptoms: Shortness of Breath  Have your medications changed?: No  Do you have any questions related to your medications?: No  Do you currently have any active services?: No  Do you have any needs or concerns that I can assist you with?: No  Identified Barriers: Lack of Education  Care Transitions Interventions  No Identified Needs  Other Interventions:         Spoke with patient today 21 for TCM/hospital discharge (Low Risk) follow up. Patient states he continues with ongoing shortness of breath. Denies any chest pain, chest discomfort or palpitations. States having swelling to both legs. Encouraged to keep legs elevated when resting and follow low sodium diet. Denies any nausea, vomiting, diarrhea, chills or fever. Confirmed with patient he completed follow up appt with Dr. Jolly Dickens (PCP) on 21 and is awaiting to get scheduled with Dr. Edgar Paul (cardiology). States he is new to Dr. Edgar Paul and just filed out questionnaire and waiting for office to schedule appt. Denies any other complaints or concerns at this time. CTN will continue to follow for Care Transition.       Follow Up  Future Appointments   Date Time Provider Department Center   2021 10:45 AM Dorie Mccarty MD [facility]

## 2021-06-09 NOTE — CARE COORDINATION
[facility] Transitions Follow Up Call    2021    Patient: Randall Hensley  Patient : 1928   MRN: [id number]  Reason for Admission: AFib with RVR  Discharge Date: 21 RARS: Readmission Risk Score: 13         Spoke with: Chrissy [address] Drive Transitions Subsequent and Final Call    Subsequent and Final Calls  Do you have any ongoing symptoms?: Yes  Onset of Patient-reported symptoms: Other  Patient-reported symptoms: Shortness of Breath  Have your medications changed?: No  Do you have any questions related to your medications?: No  Do you currently have any active services?: No  Do you have any needs or concerns that I can assist you with?: No  Identified Barriers: None  Care Transitions Interventions  No Identified Needs  Other Interventions:            Follow Up  Future Appointments   Date Time Provider Department Center   2021 10:45 AM Helen Pinedo MD [facility]

## 2021-06-16 NOTE — CARE COORDINATION
[facility] Transitions Follow Up Call    2021    Patient: Beatriz Jackson  Patient : 1928   MRN: [id number]  Reason for Admission: AFib with RVR  Discharge Date: 21 RARS: Readmission Risk Score: 15    Care Transitions Follow Up Call    Needs to be reviewed by the provider   Additional needs identified to be addressed with provider: Yes and No  Severe LE swelling over the past week             Method of communication with provider : chart routing      Care Transition Nurse (CTN) contacted the patient by telephone to follow up after admission on 21. Verified name and  with patient as identifiers. Addressed changes since last contact: none  Discussed follow-up appointments. If no appointment was previously scheduled, appointment scheduling offered: Yes. Is follow up appointment scheduled within 7 days of discharge? CTN confirmed with patient he competed HFU appt with Dr. Luis Crane (PCP) on 21. Patient states she has a call out to Dr. Divya Moreno (Cardio) and awaiting a return call regarding scheduling f/u appt. Advance Care Planning:   Does patient have an Advance Directive:  reviewed and current. CTN reviewed discharge instructions, medical action plan and red flags with patient and discussed any barriers to care and/or understanding of plan of care after discharge. Discussed appropriate site of care based on symptoms and resources available to patient including: PCP, Specialist, Urgent care clinics and When to call 911. The patient agrees to contact the PCP office for questions related to their healthcare. Patients top risk factors for readmission: medical condition-CKD, DM and Sepsis    . Plan for follow-up call in 5-7 days based on severity of symptoms and risk factors. Plan for next call: symptom management-Has LE swelling improved?       Care Transitions Subsequent and Final Call    Subsequent and Final Calls  Do you have any ongoing symptoms?: Yes  Onset of Patient-reported symptoms: In the past 7 days  Patient-reported symptoms: Other  Have your medications changed?: No  Do you have any questions related to your medications?: No  Do you currently have any active services?: No  Do you have any needs or concerns that I can assist you with?: Yes  Patient-reported Needs or Concerns: Advised to call PCP regarding severe LE swelling  Identified Barriers: Lack of Education  Care Transitions Interventions  No Identified Needs  Other Interventions:         Spoke with patient today 6/16/21 for TCM/hospital discharge (Low RisK) sub call for AFib with RVR. Patient complains of severe LE swelling that has worsened over the past week. Denies any shortness of breath, chest pain, chest discomfort, palpitations or abdominal swelling pain/swelling. Noted patient not being on any diuretic therapy. Patient states he has a scale but declines to weight self while CTN wait on phone saying \"it's up stairs and I can't\". Noted last weight on record was 192 lbs on 6/4/21 at PCP f/u appt. CTN advised to keep LE elevated while resting and follow low sodium diet which he verbalizes he currently does. CTN advised patient to call PCP ASAP for further advice regarding LE swelling and advised to go to ED if symptoms worsens or if having unrelieved shortness of breath/chest pain which patient verbalizes understanding. Confirmed with patient he is awaiting a return call from Dr. Nidia Bush ( cardio) regarding scheduling follow up appt. Denies any other complaints or concerns at this time. CTN will continue to follow for Care Transition. CTN will route note to PCP.      Follow Up  Future Appointments   Date Time Provider Francis Kyle   7/9/2021 10:45 AM Mayur Douglass MD [facility]       CTN provided contact information for future needs    [facility]

## 2021-06-18 NOTE — CARE COORDINATION
[facility] Transitions Follow Up Call    2021    Patient: Gricel Barnett  Patient : 1928   MRN: [id number]  Reason for Admission: AFib with RVR  Discharge Date: 21 RARS: Readmission Risk Score: 15       Attempted to contact patient today 21 for TCM/hospital discharge sub call/symptom check follow up for LE swelling. Left message on home/mobile number requesting a return call back to CTN and provided contact information.      Cristi Souza, APRN

## 2021-06-23 NOTE — CARE COORDINATION
Second attempt attempt made to outreach patient.  Left HIPAA compliant message and provided call back number

## 2021-07-09 PROBLEM — Z74.09 IMPAIRED MOBILITY AND ACTIVITIES OF DAILY LIVING: Status: ACTIVE | Noted: 2021-01-01

## 2021-07-09 PROBLEM — Z78.9 IMPAIRED MOBILITY AND ACTIVITIES OF DAILY LIVING: Status: ACTIVE | Noted: 2021-01-01

## 2021-07-09 PROBLEM — E78.00 PRIMARY HYPERCHOLESTEROLEMIA: Status: ACTIVE | Noted: 2021-01-01

## 2021-07-09 NOTE — PROGRESS NOTES
OFFICE PROGRESS NOTE      SUBJECTIVE:        Patient ID:   Adonay Correa is a 80 y.o. male who presents for   Chief Complaint   Patient presents with    Hypertension    Home Visit     Patient has order for hha but no one has reached out    [facility]     due for AWV     Arthritis     Has arthritis in both hands and its causing him pain            HPI:     FEELS GOOD. NO SYMPTOMS RELATED TO LOW BP.  WATCHING DIET BUT NOT GOOD. NO  EXERCISE. HAS PROBLEM GETTING AROUND. TOO MUCH OF JOINT PAINS. TAKING MEDICATIONS REGULARLY. Prior to Admission medications    Medication Sig Start Date End Date Taking? Authorizing Provider   levothyroxine (SYNTHROID) 137 MCG tablet Take 1 tablet by mouth daily 7/9/21  Yes Rigo Mcintyre MD   metoprolol succinate (TOPROL XL) 50 MG extended release tablet Take 1 tablet by mouth daily 7/9/21  Yes Rigo Mcintyre MD   clopidogrel (PLAVIX) 75 MG tablet Take 1 tablet by mouth daily 7/9/21  Yes Rigo Mcintyre MD   atorvastatin (LIPITOR) 40 MG tablet Take 1 tablet by mouth nightly 7/9/21  Yes Rigo Mcintyre MD   dilTIAZem (CARDIZEM CD) 120 MG extended release capsule Take 1 capsule by mouth daily 6/17/21  Yes Rigo Mcintyre MD   methylPREDNISolone (MEDROL DOSEPACK) 4 MG tablet TAKE BY MOUTH AS DIRECTED ON INSIDE OF PACKAGE 6/2/21  Yes Historical Provider, MD   isosorbide mononitrate (IMDUR) 30 MG extended release tablet Take 1 tablet by mouth daily 6/4/21  Yes Rigo Mcintyre MD   gabapentin (NEURONTIN) 100 MG capsule Take 1 capsule by mouth 3 times daily for 100 days.  5/13/21 8/21/21 Yes Rigo Mcintyre MD   Ferrous Sulfate (IRON) 325 (65 Fe) MG TABS Take by mouth   Yes Historical Provider, MD   Cholecalciferol (VITAMIN D3) 125 MCG (5000 UT) TABS Take by mouth   Yes Historical Provider, MD   vitamin B-12 (CYANOCOBALAMIN) 1000 MCG tablet Take 1,000 mcg by mouth daily   Yes Historical Provider, MD   nitroGLYCERIN (NITROSTAT) 0.4 MG SL tablet nitroglycerin 0.4 mg sublingual tablet   Place 1 tablet by sublingual route. 19  Yes Bonita Valdes MD   acetaminophen (TYLENOL) 325 MG tablet Take 650 mg by mouth every morning (before breakfast)   Yes Historical Provider, MD     Social History     Socioeconomic History    Marital status:      Spouse name: Not on file    Number of children: Not on file    Years of education: Not on file    Highest education level: Professional school degree (e.g., MD, DDS, DVM, LUCILLE)   Occupational History    Not on file   Tobacco Use    Smoking status: Former Smoker     Quit date: 1950     Years since quittin.5    Smokeless tobacco: Never Used   Vaping Use    Vaping Use: Never used   Substance and Sexual Activity    Alcohol use: No     Comment: decafe    Drug use: No    Sexual activity: Never   Other Topics Concern    Not on file   Social History Narrative    Not on file     Social Determinants of Health     Financial Resource Strain: Low Risk     Difficulty of Paying Living Expenses: Not hard at all   Food Insecurity: No Food Insecurity    Worried About Running Out of Food in the Last Year: Never true    Alonso of Food in the Last Year: Never true   Transportation Needs: No Transportation Needs    Lack of Transportation (Medical): No    Lack of Transportation (Non-Medical):  No   Physical Activity:     Days of Exercise per Week:     Minutes of Exercise per Session:    Stress:     Feeling of Stress :    Social Connections:     Frequency of Communication with Friends and Family:     Frequency of Social Gatherings with Friends and Family:     Attends Church Services:     Active Member of Clubs or Organizations:     Attends Club or Organization Meetings:     Marital Status:    Intimate Partner Violence:     Fear of Current or Ex-Partner:     Emotionally Abused:     Physically Abused:     Sexually Abused:        I have reviewed Cj's allergies, medications, problem list, medical, social and family history and have updated as needed in the electronic medical record  Review Of Systems:    Skin: no abnormal pigmentation, rash, scaling, itching, masses, hair or nail changes  Eyes: no blurring, diplopia, or eye pain  Ears/Nose/Throat: no hearing loss, tinnitus, vertigo, nosebleed, nasal congestion, rhinorrhea, sore throat  Respiratory: no cough, pleuritic chest pain, dyspnea, or wheezing  Cardiovascular: no chest pain, angina, dyspnea on exertion, orthopnea, PND, palpitations, or claudication  Gastrointestinal: no nausea, vomiting, heartburn, diarrhea, constipation, bloating,  abdominal pain, or blood per rectum. Appetite is good  Genitourinary: no urinary urgency, frequency, dysuria, nocturia, hesitancy, or incontinence  Musculoskeletal: joint pains off and on. Morning stiffness. Ambulating well  Neurologic: no paralysis, paresis, paresthesia, seizures, tremors, or headaches  Hematologic/Lymphatic/Immunologic: no anemia, abnormal bleeding/bruising, fever, chills, night sweats, swollen glands, or unexplained weight loss  Endocrine: no heat or cold intolerance and no polyphagia, polydipsia, or polyuria        OBJECTIVE:     VS:  Wt Readings from Last 3 Encounters:   07/09/21 193 lb (87.5 kg)   06/04/21 192 lb (87.1 kg)   05/30/21 192 lb 11.2 oz (87.4 kg)     Temp Readings from Last 3 Encounters:   07/09/21 96.8 °F (36 °C)   06/04/21 96.9 °F (36.1 °C)   05/30/21 98.9 °F (37.2 °C) (Oral)     BP Readings from Last 3 Encounters:   07/09/21 (!) 122/52   06/04/21 132/62   05/30/21 (!) 181/82        General appearance: Alert, Awake, Oriented times 3, no distress  Skin: Warm and dry  Head: Normocephalic. No masses, lesions or tenderness noted  Eyes: Conjunctivae appear normal. PERLE  Ears: External ears normal  Nose/Sinuses: Nares normal. Septum midline. Mucosa normal. No drainage  Oropharynx: Oropharynx clear with no exudate seen  Neck: Neck supple.  No jugular venous distension, lymphadenopathy or thyromegaly Trachea midline  Chest:  Normal. Movements are Normal and Equal.  Lungs: Lungs clear to auscultation bilaterally. No ronchi, crackles or wheezes  Heart: S1 S2  Regular rate and rhythm. No rub, murmur or gallop  Abdomen: Abdomen soft, non-tender. BS normal. No masses, organomegaly. Back: Grossly Normal and Equal. DTR are Normal. SLR is Normal.  Extremities: Arthritic changes are noted. Movements are limited. Pedal pulses are normal.  Musculoskeletal: Muscular strength appears intact. No joint effusion, tenderness, swelling or warmth  Neuro:  No focal motor or sensory deficits        ASSESSMENT     Patient Active Problem List    Diagnosis Date Noted    Acquired hypothyroidism     Essential hypertension     GERD (gastroesophageal reflux disease)     Colitis     Primary hypercholesterolemia 07/09/2021    Impaired mobility and activities of daily living 07/09/2021    NSTEMI (non-ST elevated myocardial infarction) ([facility] 05/29/2021    CKD (chronic kidney disease) stage 4, GFR 15-29 ml/min ([facility]) 08/11/2020    Atrial fibrillation with RVR ([facility] 12/11/2019        Diagnosis:     ICD-10-CM    1. Type 2 diabetes mellitus with stage 3a chronic kidney disease, without long-term current use of insulin ([facility])  E11.22     N18.31     DIET CONTROLLED   2. Atrial fibrillation with RVR ([facility])  I48.91 CBC Auto Differential    STABLE   3. Essential hypertension  I10 CBC Auto Differential    CONTROLLED   4. Acquired hypothyroidism  E03.9 TSH without Reflex     T4    ? CONTROL   5. CKD (chronic kidney disease) stage 4, GFR 15-29 ml/min ([facility])  N18.4     STABLE   6. Impaired mobility and activities of daily living  Z74.09     Z78.9    7. Primary hypercholesterolemia  E78.00 Comprehensive Metabolic Panel     Lipid Panel       PLAN:           Patient Instructions   LOW SALT,LOW CARB. AND LOW FAT DIET. CONTINUE CURRENT MEDICATIONS TAKING REGULARLY. REGULAR WALKING ADVISED. ADVISED WEIGHT REDUCTION.   FASTING FOR LAB WORK ONE MORNING. NEXT APPOINTMENT IN 3 MONTHS ANNUAL PHYSICAL EXAMINATION       Return in about 3 months (around 10/9/2021) for [address] [employer]. .         I have reviewed my findings and recommendations with Coral Roldan.     Electronically signed by Amos Be MD on 7/9/21 at 11:26 AM EDT

## 2021-07-09 NOTE — PATIENT INSTRUCTIONS
LOW SALT,LOW CARB. AND LOW FAT DIET. CONTINUE CURRENT MEDICATIONS TAKING REGULARLY. REGULAR WALKING ADVISED. ADVISED WEIGHT REDUCTION. FASTING FOR LAB WORK ONE MORNING.   NEXT APPOINTMENT IN 3 MONTHS ANNUAL PHYSICAL EXAMINATION

## 2021-07-23 PROBLEM — I63.9 ISCHEMIC STROKE (HCC): Status: ACTIVE | Noted: 2021-01-01

## 2021-07-23 NOTE — LETTER
[place] Department Medicaid  CERTIFICATION OF NECESSITY  FOR NON-EMERGENCY TRANSPORTATION   BY GROUND AMBULANCE      Individual Information   1. Name: Karleen Favre 2. [place] Medicaid Billing Number:    3. Address: [address] [place] [address]      Transportation Provider Information   4. Provider Name: PAS   5. [place] Medicaid Provider Number: [id number] National Provider Identifier (NPI): [id number]     Certification  7. Criteria:  During transport, this individual requires:  [x] Medical treatment or continuous     supervision by an EMT. [x] The administration or regulation of oxygen by another person. [] Supervised protective restraint. 8. Period Beginning Date: 21   9. Length  [x] Not more than 1 day(s)  [] One Year     Additional Information Relevant to Certification   10. Comments or Explanations, If Necessary or Appropriate     6l HFNC, COVID (-) 21. [facility] 15, ALERT TO SELF ONLY, TELESITTER, HX ISCHEMIC STROKE, NSTEMI, AFIB RVR, CKD, HTN, DM     Certifying Practitioner Information   11. Name of Practitioner: Elena Piper   12. [place] Medicaid Provider Number, If Applicable:  [facility] Provider Identifier (NPI): [id number]     Signature Information   14. Date of Signature: 21 15. Name of Person Signing: Regulo Harrison   16. Signature and Professional Designation: Regulo Harrison RN CM/DISCHARGE PLANNER     [facility] [address]  Rev. 2015  [facility] Encounter Date/Time: 2021 [address] Account: [de-identified]    MRN: [id number]    Patient: Karleen Favre    Contact Serial #: [id number]      ENCOUNTER          Patient Class: I Private Enc?   No Unit RM BD: Rogerio Rkp. 18. Service: Intermediate   Encounter DX: Ischemic stroke ([facility] [I*   ADM Provider: Lynnette Warren MD   Procedure:     ATT Provider: Lynnette Warren MD   REF Provider:        Admission DX: Ischemic stroke [facility]) and DX codes: I63.9      PATIENT                 Name: Karleen Favre : 1928 (89 yrs)   Address: [address] [place] [address] Sex: Male   City: [address] [place] [address]         Marital Status:    Employer: RETIRED         Rastafarian: Islam   Primary Care Provider: Ilana Crockett MD         Primary Phone: [phone]   EMERGENCY CONTACT   Contact Name Legal Guardian? Relationship to Patient Home Phone Work Phone   1. Shanna Laurent  2. AnastasiiaJosemanuel No  No Child  Child [phone]              GUARANTOR            Guarantor: Shaheen Graham     : 1928   Address: [address] [place] [address] Sex: [place]     [place] [address]     Relation to Patient: Self       Home Phone: [phone]   Guarantor ID: [id number]       Work Phone:     Guarantor Employer: RETIRED         Status: RETIRED      COVERAGE        PRIMARY INSURANCE   Payor: MEDICARE Plan: MEDICARE PART A AND B   Payor Address: [facility] [address],  [facility], [facility]       Group Number:   Insurance Type: INDEMNITY   Subscriber Name: Rachana Marvin : 1928   Subscriber ID: [id number] Pat. Rel. to Sub: Self   SECONDARY INSURANCE   Payor:   Plan:     Payor Address:  ,           Group Number:   Insurance Type:     Subscriber Name:   Subscriber :     Subscriber ID:   Pat.  Rel. to Sub:

## 2021-07-23 NOTE — ED PROVIDER NOTES
Kaitlin Del Toro is a 80 y.o. male    Chief Complaint   Patient presents with    Fall     today, was found on floor for unspecified time by daughter, pt does not remember fall         HPI   Mr. José Antonio Escalante is a 24-year-old man who presents to the emergency room having had syncope and unwitnessed fall. Patient woke up this morning at around 7 AM and does not remember anything after that until later this afternoon when he found himself on the floor. He has no idea how long he was down or what happened in between. His daughter called at around that time and fell he was unable to get up off the floor he was able to answer her on speaker phone. He has no complaints of changes in vision, headaches, changes in sensation. He has no pain anywhere other than his normal arthritic pain. Review of Systems   Constitutional: Negative for appetite change and fatigue. HENT: Negative for congestion, rhinorrhea and trouble swallowing. Eyes: Negative for redness and itching. Respiratory: Negative for chest tightness, shortness of breath and wheezing. Cardiovascular: Negative for chest pain, palpitations and leg swelling. Gastrointestinal: Negative for abdominal distention, abdominal pain, constipation, diarrhea, nausea and vomiting. Genitourinary: Negative for decreased urine volume, difficulty urinating and frequency. Musculoskeletal: Positive for arthralgias (baseline). Negative for back pain and myalgias. Neurological: Positive for syncope. Negative for dizziness, speech difficulty, weakness, light-headedness, numbness and headaches. Psychiatric/Behavioral: Negative for agitation, behavioral problems, confusion and decreased concentration. The patient is not nervous/anxious. All other systems reviewed and are negative. Physical Exam  Vitals reviewed. Constitutional:       General: He is not in acute distress. Appearance: Normal appearance. He is not ill-appearing.    HENT:      Head: Normocephalic and atraumatic. Comments: No bruising, lacerations or other injuries to the head. Nose: Nose normal. No congestion or rhinorrhea. Mouth/Throat:      Mouth: Mucous membranes are moist.      Pharynx: Oropharynx is clear. No oropharyngeal exudate or posterior oropharyngeal erythema. Eyes:      Extraocular Movements: Extraocular movements intact. Conjunctiva/sclera: Conjunctivae normal.      Pupils: Pupils are equal, round, and reactive to light. Cardiovascular:      Rate and Rhythm: Normal rate. Rhythm irregular. Heart sounds: No murmur heard. Pulmonary:      Effort: Pulmonary effort is normal. No respiratory distress. Breath sounds: Normal breath sounds. Abdominal:      General: Abdomen is flat. There is no distension. Tenderness: There is no abdominal tenderness. There is no guarding. Musculoskeletal:         General: No swelling or tenderness. Normal range of motion. Cervical back: Normal range of motion. No rigidity or tenderness. Skin:     General: Skin is warm and dry. Coloration: Skin is not jaundiced or pale. Findings: No bruising or erythema. Neurological:      General: No focal deficit present. Mental Status: He is alert and oriented to person, place, and time. Cranial Nerves: No cranial nerve deficit. Sensory: No sensory deficit. Motor: No weakness. Psychiatric:         Mood and Affect: Mood normal.         Behavior: Behavior normal.         Thought Content: Thought content normal.          Procedures     MDM   Patient is a 59-year-old male with syncope and unwitnessed fall earlier this morning not having woken up until later this afternoon. No focal neurological deficits on exam and patient has no complaints other than arthritic pain. His blood work is fairly unremarkable. On CT head scan there is a small area concerning for infarction in the left cerebellum.   Patient is already on Plavix but is possibly refused other anticoagulation in the past. Dr. Cory Dupont would like to admit him to Step down. Patient's wife arrives a couple hours into the visit. She states that several days ago he had an episode she was concerned for a heart attack but the episode was transient, and the patient refused to come to the emergency room at that time. --------------------------------------------- PAST HISTORY ---------------------------------------------  Past Medical History:  has a past medical history of Acute blood loss anemia, Acute [facility] ([facility] Atrial fibrillation with rapid ventricular response (HCC), Basal cell carcinoma, Chronic kidney disease, Colitis, Diabetes ([facility] GERD (gastroesophageal reflux disease), Hypertension, Liver abscess, Overactive bladder, and Thyroid disease. Past Surgical History:  has a past surgical history that includes joint replacement (Bilateral); Colonoscopy; Appendectomy; skin biopsy; back surgery; Circumcision, non- (2016); Cholecystectomy; Liver surgery; Cardiac catheterization (2017); Coronary angioplasty with stent (Right, 2017); Appendectomy (1939); Atherectomy (); Total knee arthroplasty (Bilateral, ); Circumcision, non- (); Cardiac catheterization (); and Diagnostic Cardiac Cath Lab Procedure (2017; 17). Social History:  reports that he quit smoking about 71 years ago. He has never used smokeless tobacco. He reports that he does not drink alcohol and does not use drugs. Family History: family history is not on file. The patients home medications have been reviewed. Allergies: Patient has no known allergies.     -------------------------------------------------- RESULTS -------------------------------------------------    LABS:  Results for orders placed or performed during the hospital encounter of 21   CBC Auto Differential   Result Value Ref Range    WBC 15.4 (H) 4.5 - 11.5 E9/L    RBC 4.36 3.80 - 5.80 E12/L    Hemoglobin 10.9 (L) 12.5 - 16.5 g/dL    Hematocrit 35.1 (L) 37.0 - 54.0 %    MCV 80.5 80.0 - 99.9 fL    MCH 25.0 (L) 26.0 - 35.0 pg    MCHC 31.1 (L) 32.0 - 34.5 %    RDW 18.6 (H) 11.5 - 15.0 fL    Platelets 296 418 - 084 E9/L    MPV 9.5 7.0 - 12.0 fL    Neutrophils % 90.4 (H) 43.0 - 80.0 %    Lymphocytes % 1.7 (L) 20.0 - 42.0 %    Monocytes % 7.8 2.0 - 12.0 %    Eosinophils % 0.8 0.0 - 6.0 %    Basophils % 0.5 0.0 - 2.0 %    Neutrophils Absolute 13.86 (H) 1.80 - 7.30 E9/L    Lymphocytes Absolute 0.31 (L) 1.50 - 4.00 E9/L    Monocytes Absolute 1.23 (H) 0.10 - 0.95 E9/L    Eosinophils Absolute 0.00 (L) 0.05 - 0.50 E9/L    Basophils Absolute 0.00 0.00 - 0.20 E9/L    Anisocytosis 1+     Polychromasia 1+     Hypochromia 1+     Poikilocytes 1+     Acanthocytes 1+     [place] Cells 2+     Ovalocytes 2+    Comprehensive Metabolic Panel w/ Reflex to MG   Result Value Ref Range    Sodium 142 132 - 146 mmol/L    Potassium reflex Magnesium 4.3 3.5 - 5.0 mmol/L    Chloride 106 98 - 107 mmol/L    CO2 24 22 - 29 mmol/L    Anion Gap 12 7 - 16 mmol/L    Glucose 103 (H) 74 - 99 mg/dL    BUN 20 6 - 23 mg/dL    CREATININE 2.5 (H) 0.7 - 1.2 mg/dL    GFR Non-African American 24 >=60 mL/min/1.73    GFR African American 29     Calcium 9.2 8.6 - 10.2 mg/dL    Total Protein 6.6 6.4 - 8.3 g/dL    Albumin 3.3 (L) 3.5 - 5.2 g/dL    Total Bilirubin 0.5 0.0 - 1.2 mg/dL    Alkaline Phosphatase 113 40 - 129 U/L    ALT 6 0 - 40 U/L    AST 11 0 - 39 U/L   Troponin   Result Value Ref Range    Troponin, High Sensitivity 83 (H) 0 - 11 ng/L   Lactic Acid, Plasma   Result Value Ref Range    Lactic Acid 1.2 0.5 - 2.2 mmol/L   CK   Result Value Ref Range    Total  20 - 200 U/L   POCT Glucose   Result Value Ref Range    Glucose 102 mg/dL    QC OK?  yes    POCT Glucose   Result Value Ref Range    Meter Glucose 102 (H) 74 - 99 mg/dL   EKG 12 Lead   Result Value Ref Range    Ventricular Rate 96 BPM    Atrial Rate 96 BPM    P-R Interval 116 ms    QRS Duration 86 ms    Q-T Interval 350 ms    QTc Calculation (Bazett) 442 ms    P Axis 4 degrees    R Axis -20 degrees    T Axis 19 degrees       RADIOLOGY:  CT Head WO Contrast   Final Result   CT head without contrast:      1. Small area of hypodensity in the left middle cerebellar peduncle and left   cerebellar hemisphere may represent artifact or an acute/early subacute   infarction. Further evaluation with MRI is recommended. 2. No intracranial hemorrhage, mass effect or midline shift. 3.  Disproportionately prominent volume loss in the medial temporal lobes, as   may be seen with Alzheimer's disease and other dementias. Clinical   correlation is needed. CT cervical spine without contrast:      1. No fracture or joint dislocation is seen. 2. Degenerative changes, as described. CT CERVICAL SPINE WO CONTRAST   Final Result   CT head without contrast:      1. Small area of hypodensity in the left middle cerebellar peduncle and left   cerebellar hemisphere may represent artifact or an acute/early subacute   infarction. Further evaluation with MRI is recommended. 2. No intracranial hemorrhage, mass effect or midline shift. 3.  Disproportionately prominent volume loss in the medial temporal lobes, as   may be seen with Alzheimer's disease and other dementias. Clinical   correlation is needed. CT cervical spine without contrast:      1. No fracture or joint dislocation is seen. 2. Degenerative changes, as described. XR CHEST 1 VIEW   Final Result   No acute abnormality identified.               ------------------------- NURSING NOTES AND VITALS REVIEWED ---------------------------  Date / Time Roomed:  7/23/2021  3:17 PM  ED Bed Assignment:  [place] [id number]    The nursing notes within the ED encounter and vital signs as below have been reviewed.      Patient Vitals for the past 24 hrs:   BP Temp Temp src Pulse Resp SpO2 Weight   07/23/21 1826 (!) 170/76   88 24 93 %    07/23/21 1534 (!)

## 2021-07-24 NOTE — CONSULTS
Palliative Care Department  [phone]  Palliative Care Initial Consult  Provider Jamari Grey MD      PATIENT: Roly Carmen  : 1928  MRN: [id number]  ADMISSION DATE: 2021  3:17 PM  Referring Provider: Nani Parisi MD    Palliative Medicine was consulted on hospital day 1 for assistance with Goals of care, Symptom management     HPI:     Michael De La O is a 80 y.o. y/o male with a history of anemia, basal cell carcinoma, CKD, DM, hypertension, liver abscess who presented to [facility]) on 2021 with syncope    ASSESSMENT/PLAN:     Pertinent Hospital Diagnoses      HTN   Hypothyroidism   CT scan: small area of hypodensity     Palliative Care Encounter / Counseling Regarding Goals of Care  Please see detailed goals of care discussion as below   At this time, Roly Carmen, Does have capacity for medical decision-making. Capacity is time limited and situation/question specific   During encounter Morgan Alva was surrogate medical decision-maker   Outcome of goals of care meeting: spoke to patient and Lloyd Diaz, pt does not want resuscitation or intubation. They want to change the code status to DNRCCA/DNI   Code status Limited DNRCCA/DNI   Advanced Directives: no POA or living will in epic   Surrogate/Legal NOK: Dominique Daniel, daughter, [phone]    Spiritual assessment: no spiritual distress identified  Bereavement and grief: to be determined  Referrals to: none today    Thank you for the opportunity to participate in the care of Roly Carmen. Jamari Grey MD  Palliative Medicine     SUBJECTIVE:     Details of Conversation:  Met patient at bedside, and spoke with daughter Lloyd Diaz. We had a discussion about his health, and his goals. After reviewing his health conditions, he is not eligable for hospice. We spoke about him transitioning to a Nursing facility, He wants to think about it. He would like to speak with a  and decide on the best option for him.  I mentioned that social workers are a bit busier on the weekends. Pt denies any pain, nausea, vomiting, constipation or depression. At this time,there are no symptom management needs      Physical Function:  PPS: 60%    Prognosis: Guarded    OBJECTIVE:     /62   Pulse 78   Temp 98.7 °F (37.1 °C) (Oral)   Resp 18   Ht 5' 8\" (1.727 m)   Wt 188 lb 7 oz (85.5 kg)   SpO2 91%   BMI 28.65 kg/m²     Physical Examination:  Gen: elderly, awake, alert   HEENT: normocephalic, atraumatic  Neck: trachea midline, no JVD  Lungs: respirations easy and not labored,   Heart: regular rate   Abdomen: soft, non-tender  Extremities: no clubbing  Skin: warm  Neuro: awake, alert, oriented x 3, follows commands    Objective data reviewed: labs, images, records, medication use, vitals and chart    Time/Communication  Greater than 50% of time spent, total 70 minutes in counseling and coordination of care at the bedside regarding goals of care and symptom management. Thank you for allowing Palliative Medicine to participate in the care of Dalila Be. Note: This report was completed using [employer] voiced recognition software. Every effort has been made to ensure accuracy; however, inadvertent computerized transcription errors may be present.

## 2021-07-24 NOTE — CONSULTS
History Of Present Illness: Mr. Serrato is a 26-year-old man who presents to the emergency room having had syncope and unwitnessed fall. Patient woke up this morning at around 7 AM and does not remember anything after that until later this afternoon when he found himself on the floor. He has no idea how long he was down or what happened in between. His daughter called at around that time and fell he was unable to get up off the floor he was able to answer her on speaker phone. He has no complaints of changes in vision, headaches, changes in sensation. He has no pain anywhere other than his normal arthritic pain. As above per ed staff. Patient interviewed and reports difficulty with balance prompting hospitalization. At baseline he uses walker.;lives alone with daughter visiting/helping daily per his report. The patient is a 80 y.o. male with significant past medical history of see below who presents with above. The patient has the following symptoms:    Change in level of consciousness: alert    New Weakness: yes    Numbness or Tingling: no    Difficulty Swallowing: no    Current Medications:   Scheduled Meds:   acetaminophen  650 mg Oral QAM AC    atorvastatin  40 mg Oral Nightly    Vitamin D  5,000 Units Oral Daily    clopidogrel  75 mg Oral Daily    dilTIAZem  120 mg Oral Daily    ferrous sulfate  325 mg Oral Daily    gabapentin  100 mg Oral TID    isosorbide mononitrate  30 mg Oral Daily    levothyroxine  137 mcg Oral Daily    metoprolol succinate  50 mg Oral Daily    vitamin B-12  1,000 mcg Oral Daily    sodium chloride flush  5-40 mL Intravenous 2 times per day    enoxaparin  30 mg Subcutaneous Daily     Continuous Infusions:   sodium chloride       PRN Meds:nitroGLYCERIN, sodium chloride flush, sodium chloride, ondansetron **OR** ondansetron, polyethylene glycol, perflutren lipid microspheres, labetalol, polyvinyl alcohol    Allergies:  Patient has no known allergies.     Social History:   TOBACCO:   reports that he quit smoking about 71 years ago. He has never used smokeless tobacco.  ETOH:   reports no history of alcohol use. Past Medical History:        Diagnosis Date    Acute blood loss anemia 2017    Acute MI ([facility] [address] 2017    Atrial fibrillation with rapid ventricular response (HCC)     Basal cell carcinoma 2018    Chronic kidney disease     Colitis     Diabetes ([facility] [address]     GERD (gastroesophageal reflux disease)     Hypertension     Liver abscess     Overactive bladder     Thyroid disease        Past Surgical History:        Procedure Laterality Date    APPENDECTOMY      APPENDECTOMY      ATHERECTOMY      BACK SURGERY      lumbar 1,7    CARDIAC CATHETERIZATION  2017    Dr. Sparks Sport     [address], NON-  2016    Dr Santizo [place], [place]  [address]    COLONOSCOPY      CORONARY ANGIOPLASTY WITH STENT PLACEMENT Right 2017    Dr Deangelo Velasco x 2 LAD x 1 diagonal x 1 RCA    DIAGNOSTIC CARDIAC CATH LAB PROCEDURE  2017; 17    Dr. Raman Michel Bilateral     LIVER SURGERY      SKIN BIOPSY      ear    TOTAL KNEE ARTHROPLASTY Bilateral          Outside reports reviewed: ER records, historical medical records, lab reports and radiology reports. Patient's medications, allergies, past medical, surgical, social and family histories were reviewed and updated as appropriate. Review of Systems  A comprehensive review of systems was negative except for:       Objective:     Neuro exam 136/62 p 78 t 98.7  General: normal orientation and alertness. Cranial nerve testing was normal.  Funduscopic eye exam revealed not testable. Motor exam: 5-/5. Deep tendon reflexes were absent bilaterally. Plantar responses were flexor bilaterally. Cerebellar exam noted dysmetria on finger to nose on the left.   Sensation was normal to joint position sense, light touch and a pin prick . Jered Billing Assessment:   Left cerebellar/pontine infarct with JOSEFA      Plan:   Agree with plavix and statin; ongoing bp control. Presently refusing MRI studies; need to consider code status issues.   If carotid us shows significant stenosis then vascular surgery evaluation  PT/?rehab  Thanks for consult  PT/rehab

## 2021-07-24 NOTE — PROGRESS NOTES
Obtained pt's living will and made copy. Hardcopy is in soft chart.  Electronically signed by Rachael Lesches, RN on 7/24/2021 at 3:10 PM

## 2021-07-24 NOTE — PROGRESS NOTES
Nuvia Dominguez,    Your patient is on a medication that requires a renal dose adjustment. Renal Function Assessment:    Date Body Weight IBW Adj. Body Weight SCr CrCl Dialysis status   7/23/2021 88.5 kg 68.4 kg 76.4 kg 2.5 20 ml/min N/A       Pharmacy has renally dose-adjusted the following medication(s):    Date Medication Original Dosing Regimen New Dosing Regimen   7/23/2021 Enoxaparin 40 mg daily 30 mg daily           These changes were made per protocol according to the [facility] Renal Function-Based Dose Adjustments Policy    *Please note this dose may need readjusted if your patient's renal function significantly improves. Please contact pharmacy with any questions regarding these changes.     aSbiha Bashir, [address] 7/23/2021 8:17 PM

## 2021-07-24 NOTE — PROGRESS NOTES
Physical Therapy Initial Evaluation/Plan of Care    Room #:  0115/5300-06  Patient Name: Pato Nguyễn  YOB: 1928  MRN: [id number]    Date of Service: 7/24/2021     Tentative placement recommendation: Inpatient Rehab  Equipment recommendation: None      Evaluating Physical Therapist: Maida Ch, PT, DPT     Specific Provider Orders/Date/Referring Provider :   07/23/21 2000   PT evaluation and treat Start: 07/23/21 2000, End: 07/23/21 2000, ONE TIME, Standing Count: 1 Occurrences, R    Mya Lam DO Acknowledge New    Admitting Diagnosis:   Ischemic stroke ([facility] [address] [I63.9]     Visit Diagnoses       Codes    Syncope and collapse    -  Primary R55    Cerebrovascular accident (CVA), unspecified mechanism ([facility] [address]     I63.9        Surgery: None    Patient Active Problem List   Diagnosis    GERD (gastroesophageal reflux disease)    Acquired hypothyroidism    Essential hypertension    Colitis    Atrial fibrillation with RVR ([facility] [address]    CKD (chronic kidney disease) stage 4, GFR 15-29 ml/min ([address]    NSTEMI (non-ST elevated myocardial infarction) ([address]    Primary hypercholesterolemia    Impaired mobility and activities of daily living    Ischemic stroke ([facility] [address]   None    ASSESSMENT of Current Deficits Patient exhibits decreased strength, balance, endurance and coordination impairing functional mobility, transfers, gait , gait distance and tolerance to activity        PHYSICAL THERAPY  PLAN OF CARE       Physical therapy plan of care is established based on physician order,  patient diagnosis and clinical assessment    Current Treatment Recommendations:    -Bed Mobility: Lower extremity exercises  and Trunk control activities   -Sitting Balance: Incorporate reaching activities to activate trunk muscles , Hands on support to maintain midline , Facilitate active trunk muscle engagement , Facilitate postural control in all planes  and Engage in core activities to allow for movement within base of support -Standing Balance: Perform strengthening exercises in standing to promote motor control with or without upper extremity support , Instruct patient on adequate base of support to maintain balance and Challenge balance utilizing reaching  activities beyond center of gravity    -Transfers: Provide instruction on proper hand and foot position for adequate transfer of weight onto lower extremities and use of gait device, Cues for hand placement, technique and safety, Facilitate weight shift forward on to lower extremities and provide necessary stabilization of bilateral lower extremities , Support transfer of weight on to lower extremities, Assist with extension of knees trunk and hip to accept weight transfer  and Provide stabilization to prevent fall   -Gait: Gait training, Standing activities to improve: base of support, weight shift, weight bearing , Exercises to improve trunk control, Exercises to improve hip and knee control, Performance of protected weight bearing activities and Activities to increase weight bearing   -Endurance: Utilize Supervised activities to increase level of endurance to allow for safe functional mobility including transfers and gait     PT long term treatment goals are located in below grid    Patient and or family understand(s) diagnosis, prognosis, and plan of care. Frequency of treatments: Patient will be seen  1-2x per day. Prior Level of Function: Patient ambulated with wheeled walker for short distances < 20'.   Rehab Potential: good  for baseline    Past medical history:   Past Medical History:   Diagnosis Date    Acute blood loss anemia 05/14/2017    Acute MI ([facility] [address] 04/17/2017    Atrial fibrillation with rapid ventricular response (HCC)     Basal cell carcinoma 2018    Chronic kidney disease     Colitis     Diabetes ([facility] [address]     GERD (gastroesophageal reflux disease)     Hypertension     Liver abscess 2014    Overactive bladder     Thyroid disease      Past Surgical History:   Procedure Laterality Date    APPENDECTOMY      APPENDECTOMY  193    ATHERECTOMY      BACK SURGERY      lumbar 1,7    CARDIAC CATHETERIZATION  2017    Dr. Ean Salvador     [address], NON-  2016    Dr Mi Bynum, NON-      COLONOSCOPY      CORONARY ANGIOPLASTY WITH STENT PLACEMENT Right 2017    Dr Jazz Mooney x 2 LAD x 1 diagonal x 1 RCA    DIAGNOSTIC CARDIAC CATH LAB PROCEDURE  2017; 17    Dr. Enedina Farrell Bilateral     LIVER SURGERY      SKIN BIOPSY      ear    TOTAL KNEE ARTHROPLASTY Bilateral        SUBJECTIVE:    Precautions: Syncope, 2L of O2, falls and O2 ,   Social history: Patient lives alone in a ranch home  with 2 steps  to enter with Rail  Tub shower grab bars    Equipment owned: [address], [address] [employer] chair and [employer],      [address]       [facility] Mobility Inpatient   How much difficulty turning over in bed?: A Little  How much difficulty sitting down on / standing up from a chair with arms?: A Little  How much difficulty moving from lying on back to sitting on side of bed?: A Little  How much help from another person moving to and from a bed to a chair?: A Lot  How much help from another person needed to walk in hospital room?: Total  How much help from another person for climbing 3-5 steps with a railing?: Total  AM-PAC Inpatient Mobility Raw Score : 13  AM-PAC Inpatient T-Scale Score : 36.74  Mobility Inpatient CMS 0-100% Score: 64.91  Mobility Inpatient CMS G-Code Modifier : CL    Nursing cleared patient for PT evaluation. The admitting diagnosis and active problem list as listed above have been reviewed prior to the initiation of this evaluation. OBJECTIVE;   Initial Evaluation  Date: 2021 Treatment Date:     Short Term/ Long Term   Goals   Was pt agreeable to Eval/treatment?  Yes   To be met in 4 days Pain level   5/10  \"all over\"     Bed Mobility    Rolling: Minimal assist of 1   Supine to sit: Minimal assist of 1   Sit to supine: Minimal assist of 1   Scooting: Minimal assist of 1   Rolling: Independent   Supine to sit:  Independent   Sit to supine: Independent   Scooting: Independent    Transfers Sit to stand: Minimal assist of 1   Sit to stand: Independent    Ambulation    3-5 side steps using  wheeled walker with Minimal assist of 1   cues for sequencing, walker approximation, safety and proper hand placement   > 20 feet using  wheeled walker with Independent    Stair negotiation: ascended and descended   Not assessed      2 steps with 1 HR   ROM Within functional limits    Increase range of motion 10% of affected joints    Strength BUE:  refer to OT eval  RLE:  3+/5  LLE:  4-/5  Increase strength in affected mm groups by 1/3 grade   Balance Sitting EOB:  fair   Dynamic Standing:  fair -  Sitting EOB:  fair+  Dynamic Standing: fair      Patient is Alert & Oriented x person, place, time and situation and follows directions   Sensation:  Patient  denies numbness/tingling   Edema:  yes right lower extremity  Endurance: poor    Vitals: 2L liters nasal cannula   Blood Pressure at rest  Blood Pressure during session    Heart Rate at rest  Heart Rate during session    SPO2 at rest %  SPO2 during session %     Patient education  Patient educated on role of Physical Therapy, risks of immobility, safety and plan of care, energy conservation, importance of positional changes for oxygen exchange,  importance of mobility while in hospital , purse lip breathing, safety  and O2 line management and safety      Patient response to education:   Pt verbalized understanding Pt demonstrated skill Pt requires further education in this area   Yes Partial Yes      Treatment:  Patient practiced and was instructed/facilitated in the following treatment: Patient Sat edge of bed 10 minutes with Supervision  to increase dynamic sitting balance and activity tolerance. Pt required increase time and VC for sequencing and hand placement throughout function and demonstrated decreased tolerance for function throughout session. Pt was able to stand 2x, with the first stand being ~ 5 seconds and the 2nd stand the pt was able to side step laterally toward [facility] with cuing for [facility] placement. Pt too unsteady to walk independent from having bed behind him d/t instability and weakness. Pt demonstrated RLE hemiparesis as well as dysmetria while sliding R foot up L shin. Pt also demonstrated a mild R facial droop. Therapeutic Exercises:  not performed     At end of session, patient in bed with alarm call light and phone within reach, all lines and tubes intact, nursing notified. Patient would benefit from continued skilled Physical Therapy to improve functional independence and quality of life. Patient's/ family goals   get stronger      Time in  948  Time out  1023    Total Treatment Time  15 minutes    Evaluation time includes thorough review of current medical information, gathering information on past medical history/social history and prior level of function, completion of standardized testing/informal observation of tasks, assessment of data, and development of Plan of care and goals.      CPT codes:  Low Complexity PT evaluation (35591)  Therapeutic activities (65970)   15 minutes  1 unit(s)    Jose Ramon Us PT

## 2021-07-24 NOTE — H&P
History and Physical      CHIEF COMPLAINT: H/O FALL AT HOME AND HAVING PROBLEM GETTING AROUND. HISTORY OF PRESENT ILLNESS:      Mr. Ezio Vines is a 40-year-old man admitted via  ED having had syncope and unwitnessed fall. Patient woke up this morning at around 7 AM and does not remember anything after that until later this afternoon when he found himself on the floor. He has no idea how long he was down or what happened in between. His daughter called at around that time and fell he was unable to get up off the floor he was able to answer her on speaker phone. He has no complaints of changes in vision, headaches, changes in sensation. He has no pain anywhere other than his normal arthritic pain. Patient refusing any further work up now.  Will discuss condition with daughter         Past Medical History:    Past Medical History:   Diagnosis Date    Acute blood loss anemia 2017    Acute MI ([address] 2017    Atrial fibrillation with rapid ventricular response (HCC)     Basal cell carcinoma 2018    Chronic kidney disease     Colitis     Diabetes ([facility] [address]     GERD (gastroesophageal reflux disease)     Hypertension     Liver abscess     Overactive bladder     Thyroid disease        Past Surgical History:    Past Surgical History:   Procedure Laterality Date    APPENDECTOMY      APPENDECTOMY  193    ATHERECTOMY      BACK SURGERY      lumbar 1,7    CARDIAC CATHETERIZATION  2017    Dr. Aparicio Session     [address] , NON-  2016    Dr Nelda Hope, NON-      COLONOSCOPY      CORONARY ANGIOPLASTY WITH STENT PLACEMENT Right 2017    Dr Dorian Kelly x 2 LAD x 1 diagonal x 1 RCA    DIAGNOSTIC CARDIAC CATH LAB PROCEDURE  2017; 17    Dr. Geovanna Garcia Bilateral     LIVER SURGERY      SKIN BIOPSY      ear    TOTAL KNEE ARTHROPLASTY Bilateral        Medications Prior to Admission:    Medications Prior to Admission: acetaminophen (TYLENOL) 325 MG tablet, Take 650 mg by mouth every morning (before breakfast)  levothyroxine (SYNTHROID) 137 MCG tablet, Take 1 tablet by mouth daily  metoprolol succinate (TOPROL XL) 50 MG extended release tablet, Take 1 tablet by mouth daily  clopidogrel (PLAVIX) 75 MG tablet, Take 1 tablet by mouth daily  atorvastatin (LIPITOR) 40 MG tablet, Take 1 tablet by mouth nightly  dilTIAZem (CARDIZEM CD) 120 MG extended release capsule, Take 1 capsule by mouth daily  methylPREDNISolone (MEDROL DOSEPACK) 4 MG tablet, TAKE BY MOUTH AS DIRECTED ON INSIDE OF PACKAGE  isosorbide mononitrate (IMDUR) 30 MG extended release tablet, Take 1 tablet by mouth daily  gabapentin (NEURONTIN) 100 MG capsule, Take 1 capsule by mouth 3 times daily for 100 days. Ferrous Sulfate (IRON) 325 (65 Fe) MG TABS, Take by mouth  Cholecalciferol (VITAMIN D3) 125 MCG (5000 UT) TABS, Take by mouth  vitamin B-12 (CYANOCOBALAMIN) 1000 MCG tablet, Take 1,000 mcg by mouth daily  nitroGLYCERIN (NITROSTAT) 0.4 MG SL tablet, nitroglycerin 0.4 mg sublingual tablet  Place 1 tablet by sublingual route. Allergies:    Patient has no known allergies. Social History:    reports that he quit smoking about 71 years ago. He has never used smokeless tobacco. He reports that he does not drink alcohol and does not use drugs. Family History:   family history is not on file. REVIEW OF SYSTEMS:  As above in the HPI, otherwise negative    PHYSICAL EXAM:    Vitals:  /62   Pulse 78   Temp 98.7 °F (37.1 °C) (Oral)   Resp 18   Ht 5' 8\" (1.727 m)   Wt 188 lb 7 oz (85.5 kg)   SpO2 91%   BMI 28.65 kg/m²     General:  Awake, alert, oriented X 3. Well developed, well nourished, well groomed. No apparent distress. HEENT:  Normocephalic, atraumatic. Pupils equal, round, reactive to light. No scleral icterus. No conjunctival injection. Normal lips, teeth, and gums.   No nasal discharge. Neck:  Supple  Heart:  RRR, no murmurs, gallops, rubs  Lungs:  CTA bilaterally, bilat symmetrical expansion, no wheeze, rales, or rhonchi  Abdomen: Bowel sounds present, soft, nontender, no masses, no organomegaly, no peritoneal signs  Extremities:  No clubbing, cyanosis, or edema  Skin:  Warm and dry, no open lesions or rash  Neuro:  Cranial nerves 2-12 intact, no focal deficits. No sensory or Motor deficit. DTR-are normal.    Breast: deferred  Rectal: deferred  Genitalia:  deferred    LABS:    CBC:   Lab Results   Component Value Date    WBC 12.1 07/24/2021    RBC 4.74 07/24/2021    HGB 11.5 07/24/2021    HCT 38.7 07/24/2021    MCV 81.6 07/24/2021    MCH 24.3 07/24/2021    MCHC 29.7 07/24/2021    RDW 18.6 07/24/2021     07/24/2021    MPV 10.2 07/24/2021     CMP:    Lab Results   Component Value Date     07/23/2021    K 4.3 07/23/2021     07/23/2021    CO2 24 07/23/2021    BUN 20 07/23/2021    CREATININE 2.5 07/23/2021    GFRAA 29 07/23/2021    LABGLOM 24 07/23/2021    GLUCOSE 102 07/23/2021    GLUCOSE 122 04/10/2012    PROT 6.6 07/23/2021    LABALBU 3.3 07/23/2021    LABALBU 4.5 04/10/2012    CALCIUM 9.2 07/23/2021    BILITOT 0.5 07/23/2021    ALKPHOS 113 07/23/2021    AST 11 07/23/2021    ALT 6 07/23/2021       ASSESSMENT:      Active Hospital Problems    Diagnosis Date Noted    Acquired hypothyroidism [E03.9]      Priority: High     Class: Chronic    Essential hypertension [I10]      Priority: High     Class: Chronic    Ischemic stroke ([facility] [address] [I63.9] 07/23/2021       PLAN: OBSERVE FOR ANY CHANGE IN CONDITION, PT/OT, CONTINUE CURRENT MEDICATIONS, SOCIAL SERVICE CONSULT FOR DISCHARGE PLANNING, DISCUSS WITH DAUGHTER FOR PALLIATIVE CARE DISCUSSION.       Rohit Cavazos MD  11:53 AM  7/24/2021

## 2021-07-25 NOTE — PLAN OF CARE
Problem: Skin Integrity:  Goal: Absence of new skin breakdown  Description: Absence of new skin breakdown  7/25/2021 1825 by Anette Keating RN  Outcome: Met This Shift

## 2021-07-25 NOTE — PROGRESS NOTES
Physical Therapy      Patient unavailable for therapy today per nursing hold. stating patient is \"confused\" and \"unsteady\". Therapy will check back at a later time/date.     Clint Edge PTA  QGD#[id number]

## 2021-07-25 NOTE — PROGRESS NOTES
Notified Dr. Whitney Mas regarding vital signs- 99.2,AFIB with heart rate ranging between 137-161, and manual BP of 90/64. Cardizem currently running at 10 mg/hr and then titrated down to 7.5 mg/hr because of pt's low blood pressure. Dr. Whitney Mas said to keep systolic above 578 and diastolic above 60 and to recheck vital in 30 minutes to adjust Cardizem drip as needed. Will continue to monitor patients vitals closely .

## 2021-07-25 NOTE — PROGRESS NOTES
HOSPITAL   PROGRESS NOTE. SUBJECTIVE:  Buddy Price, 80 y.o., male C/O NONE EXPRESSED. FAST ASLEEP AT THIS TIME. PATIENT REFUSING ALL BLOOD WORK AND HENCE NONE DONE. CONDITION DISCUSSED WITH NURSING   STAFF. CONSULT NOTES REVIEWED. ROS:GENERAL- APPETITE- GOOD. BOWEL MOVEMENTS- NORMAL. NO URINE    PROBLEM. EENT: NORMAL            CVS: NORMAL. NO CHEST PAIN OR PALPITATION. RESPIRATORY:NO SOB. GI/: NO ABDOMINAL PAINS            MUSCULOSKELETAL: NO COMPLAIN            CNS: NO  COMPLAIN            OBJECTIVE:    GENERAL:Temperature:  Current - Temp: 100.7 °F (38.2 °C); Max - Temp  Av.4 °F (38 °C)  Min: 99.5 °F (37.5 °C)  Max: 102.3 °F (39.1 °C)  Respiratory Rate : Resp  Av.4  Min: 16  Max: 22  Pulse Range: Pulse  Av.4  Min: 89  Max: 103  Blood Presuure Range:  Systolic (63BXN), TNP:009 , Min:128 , HNJ:527   ; Diastolic (82NSD), TSC:22, Min:54, Max:88    Pulse ox Range: SpO2  Av.3 %  Min: 88 %  Max: 93 %  24hr I & O:      Intake/Output Summary (Last 24 hours) at 2021 1124  Last data filed at 2021 0451  Gross per 24 hour   Intake 130 ml   Output 75 ml   Net 55 ml       [facility] NECK:  supple, symmetrical, trachea midline,Carotids- normal,JVP- flat  HEMATOLOGIC/LYMPHATICS:  no cervical lymphadenopathy  LUNGS:clear  CARDIOVASCULAR:  Normal apical impulse, regular rate and rhythm, normal S1 and S2, no S3 or S4, and no murmur noted  ABDOMEN:  normal bowel sounds, non-distended, non-tender, no masses palpated  EXTREMITIES: ARTHRITIC CHANGES. MOVEMENTS-LIMITED. PEDAL PULSES-FAIR.   SKIN:  normal skin color, texture, turgor    Data    CBC:   Lab Results   Component Value Date    WBC 12.1 2021    RBC 4.74 2021    HGB 11.5 2021    HCT 38.7 2021    MCV 81.6 2021    MCH 24.3 2021    MCHC 29.7 07/24/2021    RDW 18.6 07/24/2021     07/24/2021    MPV 10.2 07/24/2021     CMP:    Lab Results   Component Value Date     07/23/2021    K 4.3 07/23/2021     07/23/2021    CO2 24 07/23/2021    BUN 20 07/23/2021    CREATININE 2.5 07/23/2021    GFRAA 29 07/23/2021    LABGLOM 24 07/23/2021    GLUCOSE 102 07/23/2021    GLUCOSE 122 04/10/2012    PROT 6.6 07/23/2021    LABALBU 3.3 07/23/2021    LABALBU 4.5 04/10/2012    CALCIUM 9.2 07/23/2021    BILITOT 0.5 07/23/2021    ALKPHOS 113 07/23/2021    AST 11 07/23/2021    ALT 6 07/23/2021         ASSESSMENT:    Active Hospital Problems    Diagnosis Date Noted    Acquired hypothyroidism [E03.9]      Priority: High     Class: Chronic    Essential hypertension [I10]      Priority: High     Class: Chronic    Ischemic stroke ([facility] [address] [I63.9] 07/23/2021       PLAN: CONTINUE CURRENT MEDICATIONS AND Rx. PALIATIVE CARE NOTE REVIEWED. AWAIT  INTERACTION WITH PATIENT AND DAUGHTER.        Electronically signed by Jordi Rueda MD on 7/25/21 at 11:24 AM EDT

## 2021-07-25 NOTE — PROGRESS NOTES
Physician Progress Note      PATIENT:               Watson Eason  CSN #:                  [id number]  :                       1928  ADMIT DATE:       2021 3:17 PM  [address] DATE:  RESPONDING  PROVIDER #:        Daryl Mane MD        QUERY TEXT:    Stage of Chronic Kidney Disease: Please provide further specificity, if known. Clinical indicators include: ckd, chronic kidney disease, bun, creatinine  Options provided:  -- Chronic kidney disease stage 1  -- Chronic kidney disease stage 2  -- Chronic kidney disease stage 3  -- Chronic kidney disease stage 3a  -- Chronic kidney disease stage 3b  -- Chronic kidney disease stage 4  -- Chronic kidney disease stage 5  -- Chronic kidney disease stage 5, requiring dialysis  -- End stage renal disease  -- Other - I will add my own diagnosis  -- Disagree - Not applicable / Not valid  -- Disagree - Clinically Unable to determine / Unknown        PROVIDER RESPONSE TEXT:    The patient has chronic kidney disease stage 4.       Electronically signed by:  Daryl Mane MD 2021 10:49 AM

## 2021-07-25 NOTE — PROGRESS NOTES
Assisted patient with breakfast was not following commands to swallow patient pocketing food in cheeks.  Stopped feeding patient will continue to monitor patient will attempt oral medications later

## 2021-07-25 NOTE — PLAN OF CARE
Problem: Skin Integrity:  Goal: Will show no infection signs and symptoms  Description: Will show no infection signs and symptoms  7/25/2021 0656 by Lupis Watson RN  Outcome: Met This Shift     Problem: Skin Integrity:  Goal: Absence of new skin breakdown  Description: Absence of new skin breakdown  7/25/2021 0656 by Lupis Watson RN  Outcome: Met This Shift     Problem: Pain:  Goal: Pain level will decrease  Description: Pain level will decrease  7/25/2021 0656 by Lupis Watson RN  Outcome: Met This Shift     Problem: Pain:  Goal: Control of acute pain  Description: Control of acute pain  7/25/2021 0656 by Lupis Watson RN  Outcome: Met This Shift     Problem: Pain:  Goal: Control of chronic pain  Description: Control of chronic pain  7/25/2021 0656 by Lupis Watson RN  Outcome: Met This Shift

## 2021-07-26 NOTE — PROGRESS NOTES
SPEECH/LANGUAGE PATHOLOGY  CLINICAL ASSESSMENT OF SWALLOWING FUNCTION   and PLAN OF CARE    PATIENT NAME:  Amadeo Vergara  (male)     MRN:  [id number]    :  1928  (80 y.o.)  STATUS:  Inpatient: Room 0514/0514-02    TODAY'S DATE:  2021  REFERRING PROVIDER:    Speech Language Pathology (SLP) eval and treat Start: 21, End: 21, ONE TIME, Standing Count: 1 Occurrences, R    Angel Hays DO  REASON FOR REFERRAL: fall poor intake    EVALUATING THERAPIST: Gurpreet Villalobos, SLP                 RESULTS:    DYSPHAGIA DIAGNOSIS:   Clinical indicators of limited intake on exam, not able to determine type or severity of dysphagia       DIET RECOMMENDATIONS:  Pureed consistency solids (dysphagia 1) with  thin liquids and Administer medication crushed, as able, with pudding/applesauce unless patient demonstrating improved cognitive function     FEEDING RECOMMENDATIONS:     Assistance level:  Full assistance is needed during all oral intake      Compensatory strategies recommended: Small bites/sips, Alternate solids and liquids and Check for oral pocketing      Discussed recommendations with nursing and/or faxed report to referring provider: Yes    SPEECH THERAPY  PLAN OF CARE   The dysphagia POC is established based on physician order, dysphagia diagnosis and results of clinical assessment     Skilled SLP intervention for dysphagia management on acute care 3-5 x per week until goals met, pt plateaus in function and/or discharged from hospital    Conditions Requiring Skilled Therapeutic Intervention for dysphagia:    Patient is performing below his functional baseline d/t his current acute condition, Multiple diagnoses, multiple medications, and increased dependency upon caregivers.     Specific dysphagia interventions to include:     Training in positioning for improved integrity of swallow    Specific instructions for next treatment:  development and training of compensatory swallow strategies to improve airway protection and swallow function  Patient Treatment Goals:    Short Term Goals:  Pt will implement identified compensatory swallowing strategies on 90% of opportunities or greater to improve airway protection and swallow function. Pt will participate in meal time assessment for 1-2 sessions to provide diet modification and compensatory strategy implementation due to staff report of dysphagia symptoms during meals    Long Term Goals:   Pt will improve oropharyngeal swallow function to ensure airway protection during PO intake to maintain adequate nutrition/hydration and decrease signs/symptoms of aspiration to less than 1 x/day. Patient/family Goal:    Did not state. Will further assess during treatment.     Plan of care discussed with Patient   The Patient did not demonstrate complete understanding of the diagnosis, prognosis and plan of care     Rehabilitation Potential/Prognosis: good                    ADMITTING DIAGNOSIS: Ischemic stroke ([facility] [address] [I63.9]    VISIT DIAGNOSIS:   Visit Diagnoses       Codes    Syncope and collapse    -  Primary R55    Cerebrovascular accident (CVA), unspecified mechanism ([facility] [address]     I63.9           PATIENT REPORT/COMPLAINT: does not accurately relay information during session     RN cleared patient for participation in assessment     yes     PRIOR LEVEL OF SWALLOW FUNCTION:    PAST HISTORY OF DYSPHAGIA?: none reported    Diet during hospital admission: Regular consistency solids with thin liquids    PROCEDURE:  Consistencies Administered During the Evaluation   Liquids: thin liquid   Solids:  Refused all other items during exam.  Staff reports cough with pancakes, and spitting out eggs       Method of Intake:   cup, straw  Fed by clinician      Position:   Seated, upright    CLINICAL ASSESSMENT:  Oral Stage:       Delayed A-P transit due to: cognitive function  and Oral residuals were noted :  throughout the oral cavity      Pharyngeal Stage:    No signs of aspiration were noted during this evaluation however, silent aspiration cannot be ruled out at bedside. If silent aspiration is suspected, a Videofluoroscopic Study of Swallowing (MBS) is recommended and requires a physician order. HOWEVER intake was extremely limited on exam    Cognition:   Confusion noted    Oral Peripheral Examination   Generalized oral weakness    Current Respiratory Status    6 liters nasal cannula     Parameters of Speech Production  Respiration:  Adequate for speech production  Quality:   Within functional limits  Intensity: Within functional limits    Volitional Swallow: did not consistently follow directions      Volitional Cough:   Did not consistently follow directions      Pain: No pain reported. EDUCATION:   The Speech Language Pathologist (SLP) completed education regarding results of evaluation and that intervention is warranted at this time. Learner: Patient  Education: Reviewed results and recommendations of this evaluation  Evaluation of Education:  No evidence of learning    This plan may be re-evaluated and revised as warranted. Evaluation Time includes thorough review of current medical information, gathering information on past medical history/social history and prior level of function, completion of standardized testing/informal observation of tasks, assessment of data and education on plan of care and goals. [x]The admitting diagnosis and active problem list, have been reviewed prior to initiation of this evaluation.         ACTIVE PROBLEM LIST:   Patient Active Problem List   Diagnosis    GERD (gastroesophageal reflux disease)    Acquired hypothyroidism    Essential hypertension    Colitis    [facility] fibrillation [facility] RVR ([facility])    CKD (chronic kidney disease) stage 4, GFR 15-29 ml/min ([facility])    NSTEMI (non-ST elevated myocardial infarction) ([facility] [address]    Primary hypercholesterolemia    Impaired mobility and activities of daily living    Ischemic stroke ([facility] [address]         CPT code:  14980  bedside swallow carmella Keane Asa MSCCC/SLP  Speech Language Pathologist  [id number]

## 2021-07-26 NOTE — PROGRESS NOTES
RN notified Dr. Mitchel Milton about acceptance to [facility] and they can accept today. Possible d/c 7/27/21 per Dr. Estrella Hanson.

## 2021-07-26 NOTE — PLAN OF CARE
Problem: Skin Integrity:  Goal: Will show no infection signs and symptoms  Description: Will show no infection signs and symptoms  Outcome: Met This Shift     Problem: Skin Integrity:  Goal: Absence of new skin breakdown  Description: Absence of new skin breakdown  7/26/2021 0306 by Dee Dee Kaiser RN  Outcome: Met This Shift     Problem: Pain:  Goal: Pain level will decrease  Description: Pain level will decrease  Outcome: Met This Shift     Problem: Pain:  Goal: Control of acute pain  Description: Control of acute pain  Outcome: Met This Shift     Problem: Pain:  Goal: Control of chronic pain  Description: Control of chronic pain  Outcome: Met This Shift     Problem: Cardiac:  Goal: Ability to maintain an adequate cardiac output will improve  Description: Ability to maintain an adequate cardiac output will improve  Outcome: Met This Shift     Problem: Cardiac:  Goal: Hemodynamic stability will improve  Description: Hemodynamic stability will improve  Outcome: Met This Shift

## 2021-07-26 NOTE — PROGRESS NOTES
Physical Therapy Treatment Note/Plan of Care    Room #:  7867/8582-38  Patient Name: Tigre Leblanc  YOB: 1928  MRN: [id number]    Date of Service: 7/26/2021     Tentative placement recommendation: Inpatient Rehab  Equipment recommendation: None      Evaluating Physical Therapist: Dania Pierce PT, DPT     Specific Provider Orders/Date/Referring Provider :   07/23/21 2000   PT evaluation and treat Start: 07/23/21 2000, End: 07/23/21 2000, ONE TIME, Standing Count: 1 Occurrences, R    Efrain Olivas, DO Acknowledge New    Admitting Diagnosis:   Ischemic stroke ([facility] [address] [I63.9]     Visit Diagnoses       Codes    Syncope and collapse    -  Primary R55    Cerebrovascular accident (CVA), unspecified mechanism ([facility] [address]     I63.9        Surgery: None    Patient Active Problem List   Diagnosis    GERD (gastroesophageal reflux disease)    Acquired hypothyroidism    Essential hypertension    Colitis    Atrial fibrillation with RVR ([address]    CKD (chronic kidney disease) stage 4, GFR 15-29 ml/min ([address]    NSTEMI (non-ST elevated myocardial infarction) ([address]    Primary hypercholesterolemia    Impaired mobility and activities of daily living    Ischemic stroke ([address]   None    ASSESSMENT of Current Deficits Patient exhibits decreased strength, balance, endurance and coordination impairing functional mobility, transfers, gait , gait distance and tolerance to activity. Patient needing moderate assist for trunk flexion and bilateral lower extremities for bed mobility. Pt needing minimal assist for sitting balance due to posterior lean. Pt's speech garbled intermittently as well as being confused. Pt displays tremors with supine exercises especially with right lower extremity.        PHYSICAL THERAPY  PLAN OF CARE       Physical therapy plan of care is established based on physician order,  patient diagnosis and clinical assessment    Current Treatment Recommendations:    -Bed Mobility: Lower extremity exercises 2017    Atrial fibrillation with rapid ventricular response (HCC)     Basal cell carcinoma 2018    Chronic kidney disease     Colitis     Diabetes ([facility] [address]     GERD (gastroesophageal reflux disease)     Hypertension     Liver abscess     Overactive bladder     Thyroid disease      Past Surgical History:   Procedure Laterality Date    APPENDECTOMY      APPENDECTOMY  193    ATHERECTOMY      BACK SURGERY      lumbar 1,7    CARDIAC CATHETERIZATION  2017    Dr. Dacia De Santiago     [address], NON-  2016    Dr Shyann Vega, NON-      COLONOSCOPY      CORONARY ANGIOPLASTY WITH STENT PLACEMENT Right 2017    Dr Payen Angry x 2 LAD x 1 diagonal x 1 RCA    DIAGNOSTIC CARDIAC CATH LAB PROCEDURE  2017; 17    Dr. Zac Patel Bilateral     LIVER SURGERY      SKIN BIOPSY      ear    TOTAL KNEE ARTHROPLASTY Bilateral        SUBJECTIVE:    Precautions: Syncope, 2L of O2, falls and O2 ,   Social history: Patient lives alone in a ranch home  with 2 steps  to enter with Rail  Tub shower grab bars    Equipment owned: Rollator, [employer] Veebox chair and [employer],      [address]       AM-PAC Mobility Inpatient   How much difficulty turning over in bed?: A Little  How much difficulty sitting down on / standing up from a chair with arms?: A Lot  How much difficulty moving from lying on back to sitting on side of bed?: A Lot  How much help from another person moving to and from a bed to a chair?: A Lot  How much help from another person needed to walk in hospital room?: Total  How much help from another person for climbing 3-5 steps with a railing?: Total  AM-PAC Inpatient Mobility Raw Score : 11  AM-PAC Inpatient T-Scale Score : 33.86  Mobility Inpatient CMS 0-100% Score: 72.57  Mobility Inpatient CMS G-Code Modifier : CL    Nursing cleared patient for PT treatment. Juanita Vu OBJECTIVE;   Initial Evaluation  Date: 7/24/2021 Treatment Date:  7/26/2021       Short Term/ Long Term   Goals   Was pt agreeable to Eval/treatment? Yes  yes To be met in 4 days   Pain level   5/10  \"all over\" No number given  Pain with any movement    Bed Mobility    Rolling: Minimal assist of 1   Supine to sit: Minimal assist of 1   Sit to supine: Minimal assist of 1   Scooting: Minimal assist of 1  Rolling: Minimal assist of 1   Supine to sit: Moderate assist of 1   Sit to supine: Moderate assist of 1   Scooting: Moderate assist of 1    Rolling: Independent   Supine to sit:  Independent   Sit to supine: Independent   Scooting: Independent    Transfers Sit to stand: Minimal assist of 1  Sit to stand: Not assessed       Sit to stand: Independent    Ambulation    3-5 side steps using  wheeled walker with Minimal assist of 1   cues for sequencing, walker approximation, safety and proper hand placement not assessed     > 20 feet using  wheeled walker with Independent    Stair negotiation: ascended and descended   Not assessed      2 steps with 1 HR   ROM Within functional limits    Increase range of motion 10% of affected joints    Strength BUE:  refer to OT eval  RLE:  3+/5  LLE:  4-/5  Increase strength in affected mm groups by 1/3 grade   Balance Sitting EOB:  fair   Dynamic Standing:  fair - Sitting EOB: fair - posterior lean  Dynamic Standing: not assessed    Sitting EOB:  fair+  Dynamic Standing: fair      Patient is Alert & Oriented x person and place and follows one step directions   Sensation:  Patient  denies numbness/tingling   Edema:  yes right lower extremity  Endurance: poor    Vitals: 7 liters nasal cannula   Blood Pressure at rest  Blood Pressure during session    Heart Rate at rest  Heart Rate during session    SPO2 at rest 90% SPO2 during session %     Patient education  Patient educated on role of Physical Therapy, risks of immobility, safety and plan of care, energy conservation, importance of positional changes for oxygen exchange,  importance of mobility while in hospital , purse lip breathing, safety  and O2 line management and safety      Patient response to education:   Pt verbalized understanding Pt demonstrated skill Pt requires further education in this area   Yes Partial Yes      Treatment:  Patient practiced and was instructed/facilitated in the following treatment: Patient assisted to edge of bed. Patient  Sat edge of bed 5 minutes with Minimal assist of 1 to increase dynamic sitting balance and activity tolerance. Pt worked on sitting balance due to posterior lean. Pt requested to lay back down after 5 minutes due to fatigue. Pt performed supine exercises and rolled him to put the bed pan in place. Nursing notified. Therapeutic Exercises:  ankle pumps, heel slide, hip abduction/adduction and straight leg raise, SAQ, x 10 - 15 reps. PROM/AAROM. At end of session, patient in bed with alarm call light and phone within reach, all lines and tubes intact, nursing notified. Patient would benefit from continued skilled Physical Therapy to improve functional independence and quality of life. Patient's/ family goals   get stronger      Time in  8:15  Time out  8:38    Total Treatment Time  23 minutes        CPT codes:    Therapeutic activities (28645)   13 minutes  1 unit(s)  Therapeutic exercises (36805)   10 minutes  1 unit(s)   Magdi Cunningham  [facility]  LIC # [id number]

## 2021-07-26 NOTE — CARE COORDINATION
SS Note: HENS completed for this pt- going to [facility].    Electronically signed by BREANN Lopez on 7/26/2021 at 1:54 PM

## 2021-07-26 NOTE — PROGRESS NOTES
Patient's temp is 102.1 and applied ice bags under armpits , groin and washcloth to forehead. Notified Dr. Estrella Hanson regarding this with only tylenol ordered before breakfast and stated to apply an ice bag to forehead and administer Levaquin 500 mg now and then daily thereafter. Nurse applied ice bag to forehead. Will continue to monitor pt.

## 2021-07-26 NOTE — PROGRESS NOTES
Nuvia Harris,    Your patient is on a medication that requires a renal dose adjustment. Renal Function Assessment:    Date Body Weight IBW Adj. Body Weight SCr CrCl Dialysis status   7/25/2021 89.3 kg 68.4 kg 76.8 kg 2.5 20 ml/min N/A       Pharmacy has renally dose-adjusted the following medication(s):    Date Medication Original Dosing Regimen New Dosing Regimen   7/25/2021 Levaquin 500 mg Daily Every 48 hours           These changes were made per protocol according to the [facility] Renal Function-Based Dose Adjustments Policy    *Please note this dose may need readjusted if your patient's renal function significantly improves. Please contact pharmacy with any questions regarding these changes.     Constantin Mullen, [address] 7/25/2021 8:28 PM

## 2021-07-26 NOTE — PROGRESS NOTES
SPEECH/LANGUAGE PATHOLOGY  SPEECH/LANGUAGE/COGNITIVE EVALUATION   and PLAN OF CARE      PATIENT NAME:  Pato Nguyễn  (male)     MRN:  [id number]    :  1928  (80 y.o.)  STATUS:  Inpatient: Room 0514/0514-02    TODAY'S DATE:  2021  REFERRING PROVIDER:  Speech Language Pathology (SLP) eval and treat Start: 21, End: 21, ONE TIME, Standing Count: 1 Occurrences, R    Mya Lam DO  EVALUATING THERAPIST: ARANZA Chi    ADMITTING DIAGNOSIS: Ischemic stroke [facility]) [I63.9]    VISIT DIAGNOSIS:   Visit Diagnoses       Codes    Syncope and collapse    -  Primary R55    Cerebrovascular accident (CVA), unspecified mechanism ([facility] [address]     I63.9           SPEECH THERAPY  PLAN OF CARE   The speech therapy  POC is established based on physician order, speech pathology diagnosis and results of clinical assessment     SPEECH PATHOLOGY DIAGNOSIS:    Moderate cognitive linguistic deficit    Speech Pathology intervention is recommended 3-6 times per week for LOS or when goals are met with emphasis on the following:      Conditions Requiring Skilled Therapeutic Intervention for speech, language and/or cognition    Cognitive linguistic impairment  Decreased safety awareness  Decreased short term memory  Decreased problem solving skills   Decreased thought organization    Specific Speech Therapy Interventions to Include:   Training in positioning for improved integrity of swallow    Specific instructions for next treatment: To initiate POC    SHORT/LONG TERM GOALS  Pt will improve orientation to spatial and temporal surroundings with use of external memory aides.   Pt will improve immediate, short term, recent memory during structured and unstructured tasks with 80% accuracy   Pt will improve problem solving/thought organization during structured and unstructured tasks with 80% accuracy     Patient goals: Patient/family involved in developing goals and treatment plan:   Treatment goals discussed with Patient    The Patient did not demonstrate complete understanding of the diagnosis, prognosis and plan of care   The patient/family Did not state,     This plan may be re-evaluated and revised as warranted. Rehabilitation Potential/Prognosis: fair                CLINICAL ASSESSMENT:  MOTOR SPEECH       Oral Peripheral Examination   Adequate lingual/labial strength     Parameters of Speech Production  Respiration:  Adequate for speech production  Articulation:  Within functional limits  Resonance:  Within functional limits  Quality:   Within functional limits  Pitch: Within functional limits  Intensity: Within functional limits  Fluency:  Intact  Prosody Intact    RECEPTIVE LANGUAGE    Comprehension of Yes/No Questions:    Within functional limits and Latent    Process  Simple Verbal Commands:   Latent and Cueing  Process Intermediate Verbal Commands:   Unable  Process Complex Verbal Commands:     Unable    Comprehension of Conversation:      Latent, Inconsistent and Cueing      EXPRESSIVE LANGUAGE     Serials: Functional    Imitation:  Words   Functional   Sentences Functional    Naming:  (Modality used:  Verbal)  Confrontation Naming  Functional  Functional Description  Functional  Response Naming: Impaired    Conversation:      Confusion was noted during conversation    COGNITION     Attention/Orientation  Attention: Easily Distracted  Orientation:  Oriented to Person, Reason for hospitalization roughly knows the date and that he is in the hospital     Memory   Immediate Recall: Repeated TBA    Delayed Recall:   Recalled TBA    Long Term Recall:   Recalled Address, Birthdate and Age    Organization/Problem Solving/Reasoning   Verbal Sequencing:   Impaired        Verbal Problem solving:   Impaired          CLINICAL OBSERVATIONS NOTED DURING THE EVALUATION  Latent responses, Inconsistent responses and Cueing was required                  EDUCATION:   The Speech Language Pathologist (SLP) completed education regarding results of evaluation and that intervention is warranted at this time. Learner: Patient  Education: Reviewed results and recommendations of this evaluation  Evaluation of Education:  Verbalizes understanding    Evaluation Time includes thorough review of current medical information, gathering information on past medical history/social history and prior level of function, completion of standardized testing/informal observation of tasks, assessment of data and education on plan of care and goals. CPT code:    75331  eval speech sound lang comprehension      The admitting diagnosis and active problem list, as listed below have been reviewed prior to initiation of this evaluation.         ACTIVE PROBLEM LIST:   Patient Active Problem List   Diagnosis    GERD (gastroesophageal reflux disease)    Acquired hypothyroidism    Essential hypertension    Colitis    [facility]rial fibrillation with RVR ([facility] [address]    CKD (chronic kidney disease) stage 4, GFR 15-29 ml/min ([facility])    NSTEMI (non-ST elevated myocardial infarction) ([facility] [address]    Primary hypercholesterolemia    Impaired mobility and activities of daily living    Ischemic stroke ([facility] [address]       Janice Giron MSCCC/SLP  Speech Language Pathologist  [id number]

## 2021-07-26 NOTE — CARE COORDINATION
7/26/21 1156 CM note: COVID (-) 7/25/21. Attempted to meet with patient at the bedside ; however his speech is garbled and what I could understand he was not answering appropriately. Called patient's daughter, Lita Cunningham, to discuss transition of care at discharge. Patient lives alone in a tri-level home; however patient has mostly been staying on the [place] room level\" which has an attached bathroom. There are 3 steps with rail to get to the kitchen, and the bedrooms are located on the top floor with chair lift to that floor; however Lita Cunningham states she has been looking after patient but he is at the point now that he is too much for her to handle because he has physically declined too much. Pts DME includes ww X 2 and shower chair. Pts PCP is [facility] and his pharmacy is [facility] in [place]. Patient is a  but [facility] NOT [facility]; therefore family denies the need to contact the [place] of pts admission. Pt has no hx HHC; hx SNF at [facility] in 2013. Discharge plan is SNF at: 1) [facility]- referral given to Jose and she is reviewing 2) [facility]- referral given to Qian Herrera and he is reviewing. CM/SW will continue to follow for discharge planning. Electronically signed by Krystal Marcus RN on 7/26/2021 at 12:04 PM     Update: 7/26/21 1356 Per Shakira Taylor, they can not accept this patient. Per [facility], [facility] they can accept this patient and can accept patient this evening if patient is stable to discharge. Updated charge nurse, Janay Hubbard.  Electronically signed by Krystal Marcus RN on 7/26/2021 at 1:58 PM

## 2021-07-26 NOTE — PROGRESS NOTES
HOSPITAL   PROGRESS NOTE. SUBJECTIVE:  Dalila Be, 80 y.o., male C/O  NONE EXPRESSED. PATIENT APPEARS CONFUSED AT THIS TIME. CONDITION DISCUSSED WITH  PATIENT  AND NURSING   STAFF. CONSULT NOTES REVIEWED. ROS:GENERAL- APPETITE- GOOD. BOWEL MOVEMENTS- NORMAL. NO URINE    PROBLEM. EENT: NORMAL            CVS: NORMAL. NO CHEST PAIN OR PALPITATION. RESPIRATORY:NO SOB. GI/: NO ABDOMINAL PAINS            MUSCULOSKELETAL: NO COMPLAIN            CNS: NO  COMPLAIN            OBJECTIVE:    GENERAL:Temperature:  Current - Temp: 100.2 °F (37.9 °C); Max - Temp  Av.3 °F (37.9 °C)  Min: 97.3 °F (36.3 °C)  Max: 102.9 °F (39.4 °C)  Respiratory Rate : Resp  Av.1  Min: 16  Max: 26  Pulse Range: Pulse  Av.5  Min: 88  Max: 167  Blood Presuure Range:  Systolic (48WJS), YXQ:918 , Min:90 , CTS:478   ; Diastolic (23YRY), SVH:89, Min:59, Max:84    Pulse ox Range: SpO2  Av.3 %  Min: 86 %  Max: 96 %  24hr I & O:      Intake/Output Summary (Last 24 hours) at 2021 1124  Last data filed at 2021 1817  Gross per 24 hour   Intake 240 ml   Output 100 ml   Net 140 ml       [place]. NECK:  supple, symmetrical, trachea midline,Carotids- normal,JVP- flat  HEMATOLOGIC/LYMPHATICS:  no cervical lymphadenopathy  LUNGS:clear  CARDIOVASCULAR:  Normal apical impulse, regular rate and rhythm, normal S1 and S2, no S3 or S4, and no murmur noted  ABDOMEN:  normal bowel sounds, non-distended, non-tender, no masses palpated  EXTREMITIES: ARTHRITIC CHANGES. MOVEMENTS-LIMITED. PEDAL PULSES-FAIR.   SKIN:  normal skin color, texture, turgor    Data    CBC:   Lab Results   Component Value Date    WBC 12.1 2021    RBC 4.74 2021    HGB 11.5 2021    HCT 38.7 2021    MCV 81.6 2021    MCH 24.3 2021    MCHC 29.7 2021    RDW 18.6 07/24/2021     07/24/2021    MPV 10.2 07/24/2021     CMP:    Lab Results   Component Value Date     07/23/2021    K 4.3 07/23/2021     07/23/2021    CO2 24 07/23/2021    BUN 20 07/23/2021    CREATININE 2.5 07/23/2021    GFRAA 29 07/23/2021    LABGLOM 24 07/23/2021    GLUCOSE 102 07/23/2021    GLUCOSE 122 04/10/2012    PROT 6.6 07/23/2021    LABALBU 3.3 07/23/2021    LABALBU 4.5 04/10/2012    CALCIUM 9.2 07/23/2021    BILITOT 0.5 07/23/2021    ALKPHOS 113 07/23/2021    AST 11 07/23/2021    ALT 6 07/23/2021         ASSESSMENT:    Active Hospital Problems    Diagnosis Date Noted    Acquired hypothyroidism [E03.9]      Priority: High     Class: Chronic    Essential hypertension [I10]      Priority: High     Class: Chronic    Ischemic stroke ([facility] [address] [I63.9] 07/23/2021       PLAN: CONTINUE CURRENT MEDICATIONS AND Rx.AWAIT  DISCHARGE PLANNING.       Electronically signed by Mateusz Hennessy MD on 7/26/21 at 11:24 AM EDT Statement Selected

## 2021-07-26 NOTE — PROGRESS NOTES
[address] [facility]  [facility]         Date:2021                                                   Patient Name: Stephan Baker     MRN: [id number]     : 1928     Room: [address] [place] [address]       Evaluating OT: Williams Cannon OTR/L; [id number]       Referring Provider and Orders/Date:   OT eval and treat Start: 21, End: 21, ONE TIME, Standing Count: 1 Occurrences, R    Angel Hays DO     Diagnosis:   1. Syncope and collapse    2. Cerebrovascular accident (CVA), unspecified mechanism [facility])            Pertinent Medical History:       Past Medical History:   Diagnosis Date    Acute blood loss anemia 2017    Acute MI ([facility] [address] 2017    Atrial fibrillation with rapid ventricular response (HCC)     Basal cell carcinoma 2018    Chronic kidney disease     Colitis     Diabetes ([facility] [address]     GERD (gastroesophageal reflux disease)     Hypertension     Liver abscess     Overactive bladder     Thyroid disease           Past Surgical History:   Procedure Laterality Date    APPENDECTOMY      APPENDECTOMY  193    ATHERECTOMY      BACK SURGERY      lumbar 1,7    CARDIAC CATHETERIZATION  2017    Dr. Coral Mane     [address], NON-  2016    Dr Milligan Snyder, [place]  [address]    COLONOSCOPY      CORONARY ANGIOPLASTY WITH STENT PLACEMENT Right 2017    Dr Matthew Rizzo x 2 LAD x 1 diagonal x 1 RCA    DIAGNOSTIC CARDIAC CATH LAB PROCEDURE  2017; 17    Dr. Manuel Unger Bilateral     LIVER SURGERY      SKIN BIOPSY      ear    TOTAL KNEE ARTHROPLASTY Bilateral        Precautions:  Fall Risk, 6L, aphasic and possible poor historian; telesitter.      Recommended placement: IRF vs subacute    Assessment of current deficits     [x] Functional mobility [x]ADLs  [x] Strength               [x]Cognition     [x] Functional transfers   [x] IADLs         [x] Safety Awareness   [x]Endurance     [x] Fine Coordination              [x] Balance      [] Vision/perception   []Sensation      [x]Gross Motor Coordination  [x] ROM  [] Delirium                   [x] Motor Control     OT PLAN OF CARE   OT POC based on physician orders, patient diagnosis and results of clinical assessment    Frequency/Duration 1-3 days/wk for 2 weeks PRN   Specific OT Treatment Interventions to include:   * Instruction/training on adapted ADL techniques and AE recommendations to increase functional independence within precautions       * Training on energy conservation strategies, correct breathing pattern and techniques to improve independence/tolerance for self-care routine  * Functional transfer/mobility training/DME recommendations for increased independence, safety, and fall prevention  * Patient/Family education to increase follow through with safety techniques and functional independence  * Recommendation of environmental modifications for increased safety with functional transfers/mobility and ADLs  * Cognitive retraining/development of therapeutic activities to improve problem solving, judgement, memory, and attention for increased safety/participation in ADL/IADL tasks  * Therapeutic exercise to improve motor endurance, ROM, and functional strength for ADLs/functional transfers  * Therapeutic activities to facilitate/challenge dynamic balance, stand tolerance for increased safety and independence with ADLs  * Therapeutic activities to facilitate gross/fine motor skills for increased independence with ADLs     Recommended Adaptive Equipment/DME: TBD      Home Living: Pt lives alone in a ranch home with 2 steps to enter with rail    Bathroom setup: tub shower with grab bars; shower chair    DME owned: rollator     Prior Level of Function: indep with ADLs , assist with IADLs; ambulated indep Bed Mobility  Supine to sit: Moderate Assist   Sit to supine: Moderate Assist  For both trunk and L LE   Supine to sit: Stand by Assist   Sit to supine: Stand by Assist    Functional Transfers Maximal Assist for sit to stand from EOB x 5. Poor safety and high fall risk with walker and not recommending. Poor understanding. Minimal Assist    Functional Mobility NT due to pt overall debility, decreased activity tolerance, balance deficits, safety and fall risk. Poor understanding of body mechanics and ability to follow commands at time of eval.   Moderate Assist    Balance Sitting:     Static:  fair    Dynamic:fair-  Standing: poor+  Sitting:     Static:  Fair+    Dynamic:fair+  Standing: fair-   Activity Tolerance Vitals with activity:WFL  Sitting EOB for 15min period with agitation noted. Attempted various stands with <30sec tolerance with each attempt. Increase standing tolerance for >3min for carry over into toileting, functional tranfers and indep in ADLs   Visual/  Perceptual Glasses: not Present; Unable to assess due to pt unable to follow steps. Functionally, pt impulsive and unable to attend visually to therapist throughout. Reports change in vision since admission: No     NA   Rashid UE Strengthening  2-/5 generally  3+/5MMT generally for carry over into self care, functional transfers and functional mobility with AD.       Hand Dominance  [x] Right  [] Left    AROM (PROM) Strength Additional Info:    RUE  Very limited with <40 function overall in all planes and poor motor control 2-/5 Fair  and poor+ FMC/dexterity noted during ADL tasks  Dropping all items with decreased proprioception     LUE Very limited with <40 function overall in all planes and poor motor control 2-/5 Fair-  and poor+ FMC/dexterity noted during ADL tasks  Dropping all items with decreased proprioception     Hearing: WFL/Kake-fluctuated with eval  Sensation:  No c/o numbness or tingling  Tone: hypotonic; spastic movement generally  Edema: none noted    Comments: Upon arrival patient supine in bed talking to himself in mumbled speech. Pt required max A for most UB ADLs and dep A LB ADLs tasks. Limited with max A x 2  for standing during toileting and functional transfers NT due to pt overall debility, decreased activity tolerance, balance deficits, safety and fall risk. The biggest barriers reflect that of functional transfers, functional mobility, UB/LB ADLs, cognition, activity tolerance, balance, safety and strengthening. At end of session, patient supine with call light and phone within reach, all lines and tubes intact. Overall patient demonstrated max decreased independence and safety during completion of ADL/functional transfer/mobility tasks. Nursing updated on pt position and status following OT eval. Pt would benefit from continued skilled OT to increase safety and independence with completion of ADL/IADL tasks for functional independence and quality of life. Treatment: OT treatment provided this date includes:   Instruction, education and training on safe facilitation and adapted techniques for completion of ADLs. These include safe functional transfer techniques and on energy conservation/work simplification for completion of ADLs. Education provided on hand/feet placement with walker/bedrails and body mechanics for fall prevention. Cues for safety for in the home at [place], including modifications and DME, but pt very inconsistent on understanding. Extended time to complete all tasks, including skilled monitoring of patient's response during treatment session and vital signs. Prior to and at the end of session, environmental modifications / line management completed for patients safety and efficiency of treatment session. See above for further details. Rehab Potential: Fair for established goals     Patient / Family Goal: None reported.        Patient and/or family were instructed on functional diagnosis, prognosis/goals and OT plan of care. Demonstrated fair- understanding. Eval Complexity: Low  · History: Brief review of medical records and additional review of physical, cognitive, or psychosocial history related to current functional performance  · Exam: 3+ performance deficits  · Assistance/Modification: Max assistance or modifications required to perform tasks. May have comorbidities that affect occupational performance. Time In: 1028  Time Out: 1057  Total Treatment Time: 9    Min Units   OT Eval Low 97165  x  1   OT Eval Medium 06259      OT Eval High 81990      OT Re-Eval [id number]       Therapeutic Ex 32410       Therapeutic Activities 48545       ADL/Self Care 93881  9 1    Orthotic Management 48077       Manual [id number]     Neuro Re-Ed 88217       Non-Billable Time          Evaluation Time additionally includes thorough review of current medical information, gathering information on past medical history/social history and prior level of function, interpretation of standardized testing/informal observation of tasks, assessment of data and development of plan of care and goals.             Victoria Salas OTR/L; [id number]

## 2021-07-26 NOTE — PROGRESS NOTES
Patient converted back into NSR and heart rate is now 96 bpm. Notified Dr. Yamel Mcdowell and titrated Cardizem down to 5 mg/hr. Will continue to monitor pt.

## 2021-07-27 NOTE — PROGRESS NOTES
Converted to A-Fib RVR 140s-160s sustaining at 1215. No notable distress. Daughter at bedside. Dr. Yvette Heck notified. Awaiting response.  VS as follows:       07/27/21 1227   Vital Signs   Temp 100.6 °F (38.1 °C)   Temp Source Axillary   Pulse 160   Heart Rate Source Monitor   Resp 22   BP (!) 107/49   BP Location Right upper arm   Patient Position Semi fowlers

## 2021-07-27 NOTE — PROGRESS NOTES
HR still 160s and bouncing to 200s.  Titrated cardizem to 10 mg/hr       07/27/21 0330   Vital Signs   Temp 101.5 °F (38.6 °C)   Temp Source Axillary   Pulse 171   Heart Rate Source Monitor   Resp 20   /70

## 2021-07-27 NOTE — CONSULTS
CORONARY ANGIOPLASTY WITH STENT PLACEMENT Right 04/20/2017    Dr Justin Gary x 2 LAD x 1 diagonal x 1 RCA    DIAGNOSTIC CARDIAC CATH LAB PROCEDURE  04/20/2017; 4/18/17    Dr. Chas Chacon Bilateral     LIVER SURGERY      SKIN BIOPSY      ear    TOTAL KNEE ARTHROPLASTY Bilateral 1999       Medications Prior to admit:  Prior to Admission medications    Medication Sig Start Date End Date Taking? Authorizing Provider   acetaminophen (TYLENOL) 325 MG tablet Take 2 tablets by mouth every 4 hours as needed for Pain or Fever 7/27/21  Yes Anitha Hagen MD   ondansetron (ZOFRAN-ODT) 4 MG disintegrating tablet Take 1 tablet by mouth every 8 hours as needed for Nausea or Vomiting 7/27/21  Yes Anitha Hagen MD   levoFLOXacin (LEVAQUIN) 500 MG tablet Take 1 tablet by mouth daily for 10 days 7/27/21 8/6/21 Yes Anitha Hagen MD   polyethylene glycol (GLYCOLAX) 17 g packet Take 17 g by mouth daily as needed for Constipation 7/27/21 8/26/21 Yes Anitha Hagen MD   polyvinyl alcohol (LIQUIFILM TEARS) 1.4 % ophthalmic solution Apply 2 drops to eye as needed (Dry right eye) 7/27/21 8/26/21 Yes Anitha Hagen MD   levothyroxine (SYNTHROID) 137 MCG tablet Take 1 tablet by mouth daily 7/9/21   Anitha Hagen MD   metoprolol succinate (TOPROL XL) 50 MG extended release tablet Take 1 tablet by mouth daily 7/9/21   Anitha Hagen MD   clopidogrel (PLAVIX) 75 MG tablet Take 1 tablet by mouth daily 7/9/21   Anitha Hagen MD   atorvastatin (LIPITOR) 40 MG tablet Take 1 tablet by mouth nightly 7/9/21   Anitha Hagen MD   dilTIAZem (CARDIZEM CD) 120 MG extended release capsule Take 1 capsule by mouth daily 6/17/21   Anitha Hagen MD   isosorbide mononitrate (IMDUR) 30 MG extended release tablet Take 1 tablet by mouth daily 6/4/21   Anitha Hagen MD   gabapentin (NEURONTIN) 100 MG capsule Take 1 capsule by mouth 3 times daily for 100 days.  5/13/21 8/21/21  Anitha Hagen MD   Ferrous Sulfate (IRON) 325 (65 Fe) MG TABS Take by mouth    Historical Provider, MD   Cholecalciferol (VITAMIN D3) 125 MCG (5000 UT) TABS Take by mouth    Historical Provider, MD   vitamin B-12 (CYANOCOBALAMIN) 1000 MCG tablet Take 1,000 mcg by mouth daily    Historical Provider, MD   nitroGLYCERIN (NITROSTAT) 0.4 MG SL tablet nitroglycerin 0.4 mg sublingual tablet   Place 1 tablet by sublingual route.  12/14/19   Markus Vaughn MD       Current Medications:    Current Facility-Administered Medications: acetaminophen (TYLENOL) tablet 650 mg, 650 mg, Oral, PRN  [COMPLETED] dilTIAZem injection 10 mg, 10 mg, Intravenous, Once **FOLLOWED BY** dilTIAZem 125 mg in dextrose 5 % 125 mL infusion, 5-15 mg/hr, Intravenous, Continuous  levoFLOXacin (LEVAQUIN) tablet 500 mg, 500 mg, Oral, Every Other Day  gabapentin (NEURONTIN) capsule 300 mg, 300 mg, Oral, TID  acetaminophen (TYLENOL) tablet 650 mg, 650 mg, Oral, QAM AC  atorvastatin (LIPITOR) tablet 40 mg, 40 mg, Oral, Nightly  Vitamin D (CHOLECALCIFEROL) tablet 5,000 Units, 5,000 Units, Oral, Daily  clopidogrel (PLAVIX) tablet 75 mg, 75 mg, Oral, Daily  [Held by provider] dilTIAZem (CARDIZEM CD) extended release capsule 120 mg, 120 mg, Oral, Daily  ferrous sulfate (IRON 325) tablet 325 mg, 325 mg, Oral, Daily  isosorbide mononitrate (IMDUR) extended release tablet 30 mg, 30 mg, Oral, Daily  levothyroxine (SYNTHROID) tablet 137 mcg, 137 mcg, Oral, Daily  metoprolol succinate (TOPROL XL) extended release tablet 50 mg, 50 mg, Oral, Daily  nitroGLYCERIN (NITROSTAT) SL tablet 0.4 mg, 0.4 mg, Sublingual, Q5 Min PRN  vitamin B-12 (CYANOCOBALAMIN) tablet 1,000 mcg, 1,000 mcg, Oral, Daily  sodium chloride flush 0.9 % injection 5-40 mL, 5-40 mL, Intravenous, 2 times per day  sodium chloride flush 0.9 % injection 5-40 mL, 5-40 mL, Intravenous, PRN  0.9 % sodium chloride infusion, 25 mL, Intravenous, PRN  ondansetron (ZOFRAN-ODT) disintegrating tablet 4 mg, 4 mg, Oral, Q8H PRN **OR** ondansetron (ZOFRAN) injection 4 mg, 4 mg, Intravenous, Q6H PRN  polyethylene glycol (GLYCOLAX) packet 17 g, 17 g, Oral, Daily PRN  perflutren lipid microspheres (DEFINITY) injection 1.65 mg, 1.5 mL, Intravenous, ONCE PRN  labetalol (NORMODYNE;TRANDATE) injection 10 mg, 10 mg, Intravenous, Q10 Min PRN  polyvinyl alcohol (LIQUIFILM TEARS) 1.4 % ophthalmic solution 2 drop, 2 drop, Ophthalmic, PRN  enoxaparin (LOVENOX) injection 30 mg, 30 mg, Subcutaneous, Daily    Allergies:  Patient has no known allergies. Social History:    Social History     Socioeconomic History    Marital status:      Spouse name: Not on file    Number of children: Not on file    Years of education: Not on file    Highest education level: Professional school degree (e.g., MD, DDS, DVM, LUCILLE)   Occupational History    Not on file   Tobacco Use    Smoking status: Former Smoker     Quit date: 1950     Years since quittin.5    Smokeless tobacco: Never Used   Vaping Use    Vaping Use: Never used   Substance and Sexual Activity    Alcohol use: No     Comment: decafe    Drug use: No    Sexual activity: Never   Other Topics Concern    Not on file   Social History Narrative    Not on file     Social Determinants of Health     Financial Resource Strain: Low Risk     Difficulty of Paying Living Expenses: Not hard at all   Food Insecurity: No Food Insecurity    Worried About Running Out of Food in the Last Year: Never true    Alonso of Food in the Last Year: Never true   Transportation Needs: No Transportation Needs    Lack of Transportation (Medical): No    Lack of Transportation (Non-Medical):  No   Physical Activity:     Days of Exercise per Week:     Minutes of Exercise per Session:    Stress:     Feeling of Stress :    Social Connections:     Frequency of Communication with Friends and Family:     Frequency of Social Gatherings with Friends and Family:     Attends [employer] Services:     Active Member of Clubs or Organizations:     Attends Club or Organization Meetings:     Marital Status:    Intimate Partner Violence:     Fear of Current or Ex-Partner:     Emotionally Abused:     Physically Abused:     Sexually Abused:        Family History:   History reviewed. No pertinent family history. REVIEW OF SYSTEMS:   Was not obtained due to dementia with incoherent speech. PHYSICAL EXAM:   BP (!) 107/49   Pulse 160   Temp 100.6 °F (38.1 °C) (Axillary)   Resp 22   Ht 5' 8\" (1.727 m)   Wt 196 lb 14.4 oz (89.3 kg)   SpO2 92%   BMI 29.94 kg/m²   CONST: Overweight. No apparent distress. Sleepy appearance, awake, verbal stimuli. Follows some commands. Speech mostly incoherent. HEENT:   Head- Normocephalic, atraumatic   Eyes- Conjunctivae pink, anicteric  Throat- Oral mucosa pink and moist  Neck-  No stridor, trachea midline, no jugular venous distention. No adenopathy   CHEST: Chest symmetrical and non-tender to palpation. No accessory muscle use or intercostal retractions  RESPIRATORY:  Lung sounds - clear throughout fields;   CARDIOVASCULAR:     No carotid bruits  Heart Inspection- shows no noted pulsations  Heart Ausculation- Regular   rate and rhythm, no murmur. No s3, s4 or rub   PV: No lower extremity edema. No varicosities. Pedal pulses palpable, no clubbing or cyanosis   ABDOMEN: Soft, non-tender to light palpation. Bowel sounds present. No palpable masses no organomegaly; no abdominal bruit  MS: Moves all extremities. No muscular atrophy. Has generalized tremor. : Deferred  SKIN: Warm and moist,  no stasis dermatitis or ulcers on legs  NEURO / PSYCH: Has a sleepy appearance. Speech mostly incoherent. Follows some commands. DATA:    ECG reviewed with Dr. Fatoumata Shaw strips: Sinus rhythm at the moment of exam  Diagnostic:    Echo  05/29/2021  PROVIDER:     Fidencio Beltran MD     ECHOCARDIOGRAPHER:  Farshad Arora.     INDICATIONS:  Non-ST elevation MI.     2-D MEASUREMENTS:  Left ventricular outflow tract 2.0 cm. Right  ventricle diastole 3.1. Left ventricle diastole 4.4, systole 3.0. Septal and posterior wall thickness of the left ventricle is 1.4 cm. Left atrial dimension 4.4 cm. Right atrial area 20 cm2. Aortic root  diameter 3.9 cm. Aortic valve cusp separation 1.9 cm.     IMPRESSION:  1. The right ventricle is mildly dilated as is the left and right  atria. The left ventricle and aortic root are still within normal  limits. 2.  The left ventricle shows concentric left ventricular hypertrophy at  1.4 cm. Systolic function is well preserved. Ejection fraction 60%. No focal wall motion abnormality is seen. There is diastolic filling  dysfunction stage I.  3.  The mitral valve shows mild mitral annular calcification. Maximal  gradient 7.8 mmHg. Mitral valve area by pressure half time 5 cm2. There is no significant mitral stenosis. There is 1+ mitral  regurgitation. 4.  The aortic valve is sclerotic but the leaflets open well. Maximal  gradient 4.7 mmHg. No significant aortic stenosis nor insufficiency is  seen. Aortic valve area 2.4 cm2.  5.  There is no pulmonic stenosis nor insufficiency. There is trace  tricuspid regurgitation. Pulmonary pressure is estimated at 18 mmHg. 6.  There is no significant pericardial effusion nor any definite  intracavitary mass or thrombus or shunt identified.             CT Head WO Contrast    Result Date: 7/23/2021  EXAMINATION: CT OF THE HEAD WITHOUT CONTRAST; CT OF THE CERVICAL SPINE WITHOUT CONTRAST 7/23/2021 5:42 pm TECHNIQUE: CT of the head was performed without the administration of intravenous contrast. Dose modulation, iterative reconstruction, and/or weight based adjustment of the mA/kV was utilized to reduce the radiation dose to as low as reasonably achievable.; CT of the cervical spine was performed without the administration of intravenous contrast. Multiplanar reformatted images are provided for review.  Dose modulation, iterative reconstruction, and/or weight based adjustment of the mA/kV was utilized to reduce the radiation dose to as low as reasonably achievable. COMPARISON: None. HISTORY: ORDERING SYSTEM PROVIDED HISTORY: Unwitnessed fall TECHNOLOGIST PROVIDED HISTORY: Reason for exam:->Unwitnessed fall Has a \"code stroke\" or \"stroke alert\" been called? ->No Decision Support Exception - unselect if not a suspected or confirmed emergency medical condition->Emergency Medical Condition (MA) FINDINGS: CT OF THE BRAIN: BRAIN/VENTRICLES: Hypodensity seen in the region the left superior cerebellar hemisphere and left middle cerebellar peduncle may represent an acute or early subacute infarction. No hemorrhage or mass effect or midline shift is seen. There is disproportionately prominent volume loss in the medial temporal lobes, as may be seen with Alzheimer's disease and other dementias. Clinical correlation is needed. The remainder of the brain is notable for moderate volume loss and mild chronic microvascular ischemic changes. No hydrocephalus or extra-axial fluid is seen. ORBITS: The visualized portion of the orbits demonstrate no acute abnormality. SINUSES: The visualized paranasal sinuses and mastoid air cells demonstrate no acute abnormality. SOFT TISSUES/SKULL: No acute abnormality of the visualized skull or soft tissues. CT CERVICAL SPINE: BONES/ALIGNMENT: There is no acute fracture or traumatic malalignment. DEGENERATIVE CHANGES: Complete loss of disc height at C6-7. Tiny disc osteophyte complex at C6-7 results in mild central canal stenosis. Multilevel neural foraminal stenoses, worst (severe) at the right C3-4, C4-5 and C6-7 levels. SOFT TISSUES: There is no prevertebral soft tissue swelling. CT head without contrast: 1. Small area of hypodensity in the left middle cerebellar peduncle and left cerebellar hemisphere may represent artifact or an acute/early subacute infarction. Further evaluation with MRI is recommended.  2. No intracranial hemorrhage, mass effect or midline shift. 3.  Disproportionately prominent volume loss in the medial temporal lobes, as may be seen with Alzheimer's disease and other dementias. Clinical correlation is needed. CT cervical spine without contrast: 1. No fracture or joint dislocation is seen. 2. Degenerative changes, as described. CT CERVICAL SPINE WO CONTRAST    Result Date: 7/23/2021  EXAMINATION: CT OF THE HEAD WITHOUT CONTRAST; CT OF THE CERVICAL SPINE WITHOUT CONTRAST 7/23/2021 5:42 pm TECHNIQUE: CT of the head was performed without the administration of intravenous contrast. Dose modulation, iterative reconstruction, and/or weight based adjustment of the mA/kV was utilized to reduce the radiation dose to as low as reasonably achievable.; CT of the cervical spine was performed without the administration of intravenous contrast. Multiplanar reformatted images are provided for review. Dose modulation, iterative reconstruction, and/or weight based adjustment of the mA/kV was utilized to reduce the radiation dose to as low as reasonably achievable. COMPARISON: None. HISTORY: ORDERING SYSTEM PROVIDED HISTORY: Unwitnessed fall TECHNOLOGIST PROVIDED HISTORY: Reason for exam:->Unwitnessed fall Has a \"code stroke\" or \"stroke alert\" been called? ->No Decision Support Exception - unselect if not a suspected or confirmed emergency medical condition->Emergency Medical Condition (MA) FINDINGS: CT OF THE BRAIN: BRAIN/VENTRICLES: Hypodensity seen in the region the left superior cerebellar hemisphere and left middle cerebellar peduncle may represent an acute or early subacute infarction. No hemorrhage or mass effect or midline shift is seen. There is disproportionately prominent volume loss in the medial temporal lobes, as may be seen with Alzheimer's disease and other dementias. Clinical correlation is needed. The remainder of the brain is notable for moderate volume loss and mild chronic microvascular ischemic changes. No hydrocephalus or extra-axial fluid is seen. ORBITS: The visualized portion of the orbits demonstrate no acute abnormality. SINUSES: The visualized paranasal sinuses and mastoid air cells demonstrate no acute abnormality. SOFT TISSUES/SKULL: No acute abnormality of the visualized skull or soft tissues. CT CERVICAL SPINE: BONES/ALIGNMENT: There is no acute fracture or traumatic malalignment. DEGENERATIVE CHANGES: Complete loss of disc height at C6-7. Tiny disc osteophyte complex at C6-7 results in mild central canal stenosis. Multilevel neural foraminal stenoses, worst (severe) at the right C3-4, C4-5 and C6-7 levels. SOFT TISSUES: There is no prevertebral soft tissue swelling. CT head without contrast: 1. Small area of hypodensity in the left middle cerebellar peduncle and left cerebellar hemisphere may represent artifact or an acute/early subacute infarction. Further evaluation with MRI is recommended. 2. No intracranial hemorrhage, mass effect or midline shift. 3.  Disproportionately prominent volume loss in the medial temporal lobes, as may be seen with Alzheimer's disease and other dementias. Clinical correlation is needed. CT cervical spine without contrast: 1. No fracture or joint dislocation is seen. 2. Degenerative changes, as described. XR CHEST 1 VIEW    Result Date: 7/23/2021  EXAMINATION: ONE XRAY VIEW OF THE CHEST 7/23/2021 5:21 pm COMPARISON: 05/29/2021 HISTORY: ORDERING SYSTEM PROVIDED HISTORY: Syncope TECHNOLOGIST PROVIDED HISTORY: Reason for exam:->Syncope FINDINGS: There is no cardiomegaly or pulmonary vascular congestion. There is no acute infiltrate, pleural effusion or pneumothorax. No acute abnormality identified. Labs:   CARDIAC ENZYMES:  No results for input(s): CKTOTAL, CKMB, CKMBINDEX, TROPHS in the last 72 hours.   H/O TROPONIN levels    Lab Results   Component Value Date    TROPHS 83 07/23/2021    TROPHS 198 05/29/2021    TROPHS 171 05/29/2021    TROPHS 157 05/29/2021    TROPHS 155 05/29/2021    TROPONINI 0.01 05/02/2021    TROPONINI 0.64 12/12/2019    TROPONINI 0.62 12/12/2019    TROPONINI <0.01 12/11/2019    TROPONINI <0.01 06/26/2017    TROPONINI 0.01 06/08/2017    TROPONINI <0.01 06/07/2017    TROPONINI 0.02 06/07/2017    TROPONINI 0.02 05/12/2017    TROPONINI 1.09 04/25/2017     No results for input(s): PROBNP in the last 72 hours. Lab Results   Component Value Date    PROBNP 4,473 05/29/2021    PROBNP 1,326 05/02/2021    PROBNP 2,848 12/11/2019    PROBNP 1,421 06/26/2017    PROBNP 4,065 04/25/2017    PROBNP 1,883 04/16/2017     BMP: No results for input(s): NA, K, CO2, BUN, CREATININE, LABGLOM, CALCIUM in the last 72 hours. Invalid input(s): GLU  Lab Results   Component Value Date    CREATININE 2.5 07/23/2021    CREATININE 2.4 07/13/2021    CREATININE 2.5 05/29/2021    CREATININE 2.5 05/02/2021    CREATININE 1.8 12/14/2019    CREATININE 1.8 12/13/2019    CREATININE 1.9 12/13/2019    CREATININE 1.9 12/12/2019    CREATININE 2.0 12/11/2019    CREATININE 2.7 07/12/2018    CREATININE 2.7 05/07/2018    CREATININE 2.4 02/02/2018    CREATININE 1.7 08/02/2017    CREATININE 1.7 07/19/2017    CREATININE 2.2 06/26/2017    CREATININE 2.2 06/23/2017    CREATININE 1.7 06/09/2017    CREATININE 2.2 06/08/2017    CREATININE 2.5 06/07/2017    CREATININE 1.9 05/14/2017     CBC: No results for input(s): WBC, HGB, HCT, PLT in the last 72 hours. Mag: No results for input(s): MG in the last 72 hours. Phos: No results for input(s): PHOS in the last 72 hours. TSH: No results for input(s): TSH in the last 72 hours. HgA1c:   Lab Results   Component Value Date    LABA1C 5.7 (H) 07/24/2021     No results found for: EAG  BNP: No results for input(s): BNP in the last 72 hours. PT/INR: No results for input(s): PROTIME, INR in the last 72 hours. APTT:No results for input(s): APTT in the last 72 hours.     FASTING LIPID PANEL:  Lab Results   Component Value Date    CHOL 134 07/13/2021 HDL 36 07/13/2021    LDLCALC 76 07/13/2021    TRIG 108 07/13/2021     LIVER PROFILE:No results for input(s): AST, ALT, LABALBU in the last 72 hours. COVID-19 Labs:  Lab Results   Component Value Date    COVID19 Not Detected 07/25/2021     Recent Labs     07/25/21  0950   COVID19 Not Detected             ASSESSMENT:  Paroxysmal A. fib, has had a paroxysm of A. fib RVR, now spontaneously reverted to sinus rhythm, was offered oral anticoagulation by Dr. Denise Ramos on 5/29/2021 but patient apparently refused it, on diltiazem and    Toprol-XL     CAD, s/p  PCI to the LAD, diagonal 1 and RCA in 2013 complicated with acute in-stent thrombosis 1 week later post PCI, EF60% ( Echo  05/29/2021)     Chronic troponin elevation due to CKD stage IV aggravated by episodes of A. fib RVR with       probably worsening ofof type II ischemia  CKD stage IV  HTN,   HLD,   DM,   Hypothyroidism,   CVA in context of paroxysmal A. fib, refusal of oral anticoagulation, dementia  Anemia  Dementia,   Leukocytosis  Limited code status    PLAN:  Continue Telemetry  Added amiodarone  Continue current therapy  Electrolytes check and correction    Renal function check        Further cardiac recommendations will be forthcoming pending patient clinical course. Assessment and plan discussed with Dr. Yoselyn Villatoro MD    Assessment and Plan to follow as per Dr. Yoselyn Villatoro MD    Electronically signed by JUAN LUIS Howard on 7/27/2021 at 3:17 PM       Trini Cross MD    I have personally participated in a face-to-face and personally obtained history and performed physical exam on the date of service. I reviewed chart, vitals, labs and radiologic studies. I also participated in medical decision making with JUAN LUIS Howard on the date of service All of the assessments and recommendations are from me and I agree with all of the pertinent clinical information, assessment and treatment plan.  I have reviewed and edited I personally reviewed the admission EKG with the following interpretation: Sinus rhythm with occasional PVCs    ECHO: May 29, 2021,IMPRESSION:  1. The right ventricle is mildly dilated as is the left and right  atria. The left ventricle and aortic root are still within normal  limits. 2.  The left ventricle shows concentric left ventricular hypertrophy at  1.4 cm. Systolic function is well preserved. Ejection fraction 60%. No focal wall motion abnormality is seen. There is diastolic filling  dysfunction stage I.  3.  The mitral valve shows mild mitral annular calcification. Maximal  gradient 7.8 mmHg. Mitral valve area by pressure half time 5 cm2. There is no significant mitral stenosis. There is 1+ mitral  regurgitation. 4.  The aortic valve is sclerotic but the leaflets open well. Maximal  gradient 4.7 mmHg. No significant aortic stenosis nor insufficiency is  seen. Aortic valve area 2.4 cm2.  5.  There is no pulmonic stenosis nor insufficiency. There is trace  tricuspid regurgitation. Pulmonary pressure is estimated at 18 mmHg. 6.  There is no significant pericardial effusion nor any definite  intracavitary mass or thrombus or shunt identified.     Stress Test:  Angiography: Reviewed, as above.   Cardiology Labs:   BMP:    Lab Results   Component Value Date     07/23/2021    K 4.3 07/23/2021     07/23/2021    CO2 24 07/23/2021    BUN 20 07/23/2021     CMP:    Lab Results   Component Value Date     07/23/2021    K 4.3 07/23/2021     07/23/2021    CO2 24 07/23/2021    BUN 20 07/23/2021    PROT 6.6 07/23/2021     CBC:    Lab Results   Component Value Date    WBC 12.1 07/24/2021    RBC 4.74 07/24/2021    HGB 11.5 07/24/2021    HCT 38.7 07/24/2021    MCV 81.6 07/24/2021    RDW 18.6 07/24/2021     07/24/2021     PT/INR:  No results found for: PTINR  PT/INR Warfarin:  No components found for: PTPATWAR, PTINRWAR  PTT:    Lab Results   Component Value Date    APTT 114.6 05/30/2021     PTT Heparin:  No components found for: APTTHEP  Magnesium:    Lab Results   Component Value Date    MG 1.9 12/11/2019     TSH:    Lab Results   Component Value Date    TSH <0.010 05/29/2021     TROPONIN:  No components found for: TROP  BNP:  No results found for: BNP  FASTING LIPID PANEL:    Lab Results   Component Value Date    CHOL 134 07/13/2021    HDL 36 07/13/2021    TRIG 108 07/13/2021     CT Head WO Contrast   Final Result   CT head without contrast:      1. Small area of hypodensity in the left middle cerebellar peduncle and left   cerebellar hemisphere may represent artifact or an acute/early subacute   infarction. Further evaluation with MRI is recommended. 2. No intracranial hemorrhage, mass effect or midline shift. 3.  Disproportionately prominent volume loss in the medial temporal lobes, as   may be seen with Alzheimer's disease and other dementias. Clinical   correlation is needed. CT cervical spine without contrast:      1. No fracture or joint dislocation is seen. 2. Degenerative changes, as described. CT CERVICAL SPINE WO CONTRAST   Final Result   CT head without contrast:      1. Small area of hypodensity in the left middle cerebellar peduncle and left   cerebellar hemisphere may represent artifact or an acute/early subacute   infarction. Further evaluation with MRI is recommended. 2. No intracranial hemorrhage, mass effect or midline shift. 3.  Disproportionately prominent volume loss in the medial temporal lobes, as   may be seen with Alzheimer's disease and other dementias. Clinical   correlation is needed. CT cervical spine without contrast:      1. No fracture or joint dislocation is seen. 2. Degenerative changes, as described. XR CHEST 1 VIEW   Final Result   No acute abnormality identified.          VL DUP CAROTID BILATERAL    (Results Pending)       I have personally reviewed the laboratory, cardiac diagnostic and radiographic testing as outlined above:    IMPRESSION:  1. Atrial fibrillation: Paroxysmal, now in sinus rhythm, blood pressure is borderline low, will start on amiodarone, will continue metoprolol for now, declined anticoagulation in the past, will continue aspirin  2. CAD: Cardiac catheterization on (4/20/2017) Significant LAD disease with successful deployment of 3.5 mm x 28 mm and 3.5 mm x 12 mm Vision bare metal stent, Successful percutaneous coronary intervention with deployment of 2.25 mm x 18 mm bare metal stent in first diagonal branch, Successful deployment of a 2.5 mm x 15 mm Vision bare metal stent in mid RCA. The patient had in-stent thrombosis of the RCA stent, had to have another PCI, doing okay we will continue current treatment         3. History of hypertension: Now hypotensive at times, will adjust medications           4. Type 2 diabetes mellitus  5. Chronic kidney disease stage IV  6. Hypothyroidism    RECOMMENDATIONS:   1. Amiodarone 200 mg 1 by mouth twice daily  2. Discontinue Cardizem  3. Continue metoprolol  4. TSH  5. Basic metabolic panel and CBC in a.m.  6. Further cardiac recommendations will be forthcoming pending his clinical course and diagnostic test findings    I have reviewed my findings and recommendations with patient    Thank you for the consult  Electronically signed by Annika Hendrix MD on 7/27/2021 at 5:45 PM  NOTE: This report was transcribed using voice recognition software.  Every effort was made to ensure accuracy; however, inadvertent computerized transcription errors may be present

## 2021-07-27 NOTE — PROGRESS NOTES
Spoke with Dr. Jeffie Hodgkin (Dr. Quinten Conte out of [facility]) Re: cardiology consult if family would like it per Dr. Rajat Steel. Monitor and Ok to restart IV Cardizem @ 5 mg/hr and titrate accordingly with sustained RVR. Currently A-fib SVR 92-97.

## 2021-07-27 NOTE — CARE COORDINATION
7/27/21 1201 CM note: COVID(-) 7/25/21. Discharge order noted. Discharge plan remains [facility]. Arranged transport with [facility] for  at 2:30. Notified Julissa Mclain, [employer], of  time and discharge summary on chart. Pts daughter, Roque Child, at the bedside and updated her on the above. Ambulance form on soft chart. Electronically signed by Biju Matute RN on 7/27/2021 at 12:30 PM     Update: 7/27/21 1256 Patient back in afib rvr; cardiology consulted. Transport placed on WILL CALL with [facility]. Updated Enzo Diego. Pts daughter, Roque Child, at the bedside and updated.  Electronically signed by Biju Matute RN on 7/27/2021 at 12:58 PM

## 2021-07-27 NOTE — FLOWSHEET NOTE
07/27/21 0345   Provider Notification   Reason for Communication Evaluate  (HR still in the 160s-200)   Provider Name Dr. Faviola Harris   Provider Notification Physician   Method of Communication Call   Response See orders   Notification Time 0338     No change in HR after 15 min on cardizem gtt. Dr. Faviola Harris notified. See new orders. Cardizem bolus give.

## 2021-07-27 NOTE — DISCHARGE INSTR - COC
Continuity of Care Form    Patient Name: Shaheen Graham   :  1928  MRN:  [id number]    Admit date:  2021  Discharge date:  21    Code Status Order: Limited   Advance Directives:      Admitting Physician:  Ilana Crockett MD  PCP: Ilana Crockett MD    Discharging Nurse: [facility] Unit/Room#: 6498/2488-39  Discharging Unit Phone Number: ***    Emergency Contact:   Extended Emergency Contact Information  Primary Emergency Contact: Carmelita rukhsana, [address] Phone: [phone]  Mobile Phone: [phone]  Relation: Child  Secondary Emergency Contact: Sarah Tao [address] Phone: [phone]  Relation: Child   needed?  No    Past Surgical History:  Past Surgical History:   Procedure Laterality Date    APPENDECTOMY      APPENDECTOMY      ATHERECTOMY      BACK SURGERY      lumbar 1,7    CARDIAC CATHETERIZATION  2017    Dr. Zulema Lira     [facility], NON-  2016    Dr Courtney Malone, [place]  [address]    COLONOSCOPY      CORONARY ANGIOPLASTY WITH STENT PLACEMENT Right 2017    Dr Nieto Cargo x 2 LAD x 1 diagonal x 1 RCA    DIAGNOSTIC CARDIAC CATH LAB PROCEDURE  2017; 17    Dr. Mary Camacho Bilateral     LIVER SURGERY      SKIN BIOPSY      ear    TOTAL KNEE ARTHROPLASTY Bilateral        Immunization History:   Immunization History   Administered Date(s) Administered    COVID-19, Moderna, PF, 100mcg/0.5mL 2021, 2021    Influenza A ([id number]) Vaccine PF IM 2009    Influenza Vaccine, unspecified formulation 10/01/2013    Influenza Virus Vaccine 10/01/2008    Influenza, High Dose (Fluzone 65 yrs and older) 2018    Influenza, Quadv, adjuvanted, 65 yrs +, IM, PF (Fluad) 2020    Influenza, Triv, inactivated, subunit, adjuvanted, IM (Fluad 65 yrs and older) 11/11/2019    Pneumococcal Conjugate 13-valent ([id number]) 02/26/2019    Pneumococcal Polysaccharide (Khqulocqk19) 12/01/2011    Unknown Immunization 09/25/2020       Active Problems:  Patient Active Problem List   Diagnosis Code    GERD (gastroesophageal reflux disease) K21.9    Acquired hypothyroidism E03.9    Essential hypertension I10    Colitis K52.9    Atrial fibrillation with RVR [facility] I48.91    CKD (chronic kidney disease) stage 4, GFR 15-29 ml/min ([facility]) N18.4    NSTEMI (non-ST elevated myocardial infarction) ([facility] I21.4    Primary hypercholesterolemia E78.00    Impaired mobility and activities of daily living Z74.09, Z78.9    Ischemic stroke ([facility] I63.9       Isolation/Infection:   Isolation            No Isolation          Patient Infection Status       Infection Onset Added Last Indicated Last Indicated By Review Planned Expiration Resolved Resolved By    None active    Resolved    COVID-19 Rule Out 07/25/21 07/25/21 07/25/21 COVID-19, Rapid (Ordered)   07/25/21 Rule-Out Test Resulted    COVID-19 Rule Out 05/29/21 05/29/21 05/29/21 COVID-19, Rapid (Ordered)   05/29/21 Rule-Out Test Resulted            Nurse Assessment:  Last Vital Signs: BP (!) 100/56   Pulse 97   Temp 98 °F (36.7 °C) (Axillary)   Resp 20   Ht 5' 8\" (1.727 m)   Wt 196 lb 14.4 oz (89.3 kg)   SpO2 95%   BMI 29.94 kg/m²     Last documented pain score (0-10 scale): Pain Level: 2  Last Weight:   Wt Readings from Last 1 Encounters:   07/25/21 196 lb 14.4 oz (89.3 kg)     Mental Status: A+O to self    IV Access:  N/A    Nursing Mobility/ADLs:  Walking   No  Transfer  Dependent  Bathing  Dependent  Dressing  Dependent  Toileting  Dependent  Feeding  Dependent  Med Admin  Dependent  Med Delivery   crushed    Wound Care Documentation and Therapy:        Elimination:  Continence:   · Bowel: No  · Bladder: No  Urinary Catheter: None   Colostomy/Ileostomy/Ileal Conduit: No       Date of Last BM: 7/29/2021    Intake/Output continuing treatment of the diagnosis listed and that he requires [place] for less 30 days. Update Admission H&P: No change in H&P    PHYSICIAN SIGNATURE: JOSE GUNTER MD.

## 2021-07-27 NOTE — DISCHARGE SUMMARY
Discharge Summary    Micaela Bee  :  1928  MRN:  [id number]    Admit date:  2021  Discharge date:  2021    Admitting Physician:  Leonora Ivan MD    Admission Diagnoses: Ischemic stroke [facility]) [I63.9]    Discharge Diagnoses: Active Hospital Problems    Diagnosis Date Noted    Acquired hypothyroidism [E03.9]      Priority: High     Class: Chronic    Essential hypertension [I10]      Priority: High     Class: Chronic    Ischemic stroke (HCC) [I63.9] 2021       Consults:  PALLIATIVE CARE     HPI/Hospital Course:   CONTINUE CURRENT MEDICATIONS, PT/OT, PATIENT REFUSED ALL BLOOD WORK AND DIAGNOSTIC STUDIES, PALLIATIVE CARE CONSULTED, ARRANGEMENTS MADE AND TRANSFERRED TO NURSING FACILITY FOR FURTHER CARE, CASE DISCUSSED AT LENGTH WITH DAUGHTER TODAY AND ANSWERED ALL QUESTIONS.     Discharge Medications:        Medication List      START taking these medications    levoFLOXacin 500 MG tablet  Commonly known as: LEVAQUIN  Take 1 tablet by mouth daily for 10 days     ondansetron 4 MG disintegrating tablet  Commonly known as: ZOFRAN-ODT  Take 1 tablet by mouth every 8 hours as needed for Nausea or Vomiting     polyethylene glycol 17 g packet  Commonly known as: GLYCOLAX  Take 17 g by mouth daily as needed for Constipation     polyvinyl alcohol 1.4 % ophthalmic solution  Commonly known as: LIQUIFILM TEARS  Apply 2 drops to eye as needed (Dry right eye)        CHANGE how you take these medications    acetaminophen 325 MG tablet  Commonly known as: TYLENOL  Take 2 tablets by mouth every 4 hours as needed for Pain or Fever  What changed:   · when to take this  · reasons to take this        CONTINUE taking these medications    atorvastatin 40 MG tablet  Commonly known as: LIPITOR  Take 1 tablet by mouth nightly     clopidogrel 75 MG tablet  Commonly known as: PLAVIX  Take 1 tablet by mouth daily     dilTIAZem 120 MG extended release capsule  Commonly known as: CARDIZEM CD  Take 1 capsule by mouth daily     gabapentin 100 MG capsule  Commonly known as: NEURONTIN  Take 1 capsule by mouth 3 times daily for 100 days. Iron 325 (65 Fe) MG Tabs     isosorbide mononitrate 30 MG extended release tablet  Commonly known as: IMDUR  Take 1 tablet by mouth daily     levothyroxine 137 MCG tablet  Commonly known as: SYNTHROID  Take 1 tablet by mouth daily     metoprolol succinate 50 MG extended release tablet  Commonly known as: TOPROL XL  Take 1 tablet by mouth daily     nitroGLYCERIN 0.4 MG SL tablet  Commonly known as: NITROSTAT  nitroglycerin 0.4 mg sublingual tablet   Place 1 tablet by sublingual route. vitamin B-12 1000 MCG tablet  Commonly known as: CYANOCOBALAMIN     Vitamin D3 125 MCG (5000 UT) Tabs        STOP taking these medications    methylPREDNISolone 4 MG tablet  Commonly known as: MEDROL DOSEPACK           Where to Get Your Medications      These medications were sent to [facility] [address]AQU[address]N ALONSO [address] [place] [address], [place] [address] [facility] [phone] Malka Martines [phone]  [address] [place]) [place] [address]    Phone: [phone]   · acetaminophen 325 MG tablet  · levoFLOXacin 500 MG tablet  · ondansetron 4 MG disintegrating tablet  · polyethylene glycol 17 g packet  · polyvinyl alcohol 1.4 % ophthalmic solution         Disposition:   long term care facility    CONDITION AT [address].     Patient Instructions:   Current Discharge Medication List      START taking these medications    Details   ondansetron (ZOFRAN-ODT) 4 MG disintegrating tablet Take 1 tablet by mouth every 8 hours as needed for Nausea or Vomiting  Qty: 30 tablet, Refills: 0      levoFLOXacin (LEVAQUIN) 500 MG tablet Take 1 tablet by mouth daily for 10 days  Qty: 10 tablet, Refills: 0      polyethylene glycol (GLYCOLAX) 17 g packet Take 17 g by mouth daily as needed for Constipation  Qty: 527 g, Refills: 1      polyvinyl alcohol (LIQUIFILM TEARS) 1.4 % ophthalmic solution Apply 2 drops to eye as needed

## 2021-07-27 NOTE — PLAN OF CARE
Problem: Skin Integrity:  Goal: Will show no infection signs and symptoms  Description: Will show no infection signs and symptoms  7/27/2021 0452 by Estela Ames RN  Outcome: Met This Shift  7/27/2021 0452 by Estela Ames RN  Outcome: Met This Shift     Problem: Skin Integrity:  Goal: Absence of new skin breakdown  Description: Absence of new skin breakdown  7/27/2021 0452 by Estela Ames RN  Outcome: Met This Shift     Problem: Pain:  Goal: Pain level will decrease  Description: Pain level will decrease  7/27/2021 0452 by Estela Ames RN  Outcome: Met This Shift     Problem: Pain:  Goal: Control of acute pain  Description: Control of acute pain  7/27/2021 0452 by Estela Ames RN  Outcome: Met This Shift     Problem: Pain:  Goal: Control of chronic pain  Description: Control of chronic pain  7/27/2021 0452 by Estela Ames RN  Outcome: Met This Shift     Problem: Cardiac:  Goal: Ability to maintain an adequate cardiac output will improve  Description: Ability to maintain an adequate cardiac output will improve  7/27/2021 0452 by Estela Ames RN  Outcome: Not Met This Shift     Problem: Cardiac:  Goal: Hemodynamic stability will improve  Description: Hemodynamic stability will improve  7/27/2021 0452 by Estela Ames RN  Outcome: Not Met This Shift     Problem: Fluid Volume:  Goal: Ability to achieve and maintain adequate urine output will improve  Description: Ability to achieve and maintain adequate urine output will improve  7/27/2021 0452 by Estela Ames RN  Outcome: Ongoing     Problem: Respiratory:  Goal: Respiratory status will improve  Description: Respiratory status will improve  7/27/2021 0452 by Estela Ames RN  Outcome: Ongoing

## 2021-07-27 NOTE — PROGRESS NOTES
Physical Therapy    0454/3402-47    Patient unavailable for physical therapy treatment due to RN Collette Hsu) stated patient's HR was in the 200's last night and is on medicine to decrease his HR; to check on him this pm.  Roly Cunningham  [facility] [place]  LIC # [id number]

## 2021-07-27 NOTE — FLOWSHEET NOTE
07/27/21 0327   Provider Notification   Reason for Communication Evaluate  (HR 180s-200)   Provider Name Dr. Xochilt Linn   Provider Notification Physician   Method of Communication Call   Response Other (Comment)  (restart cardizem already ordered)   Notification Time 0309

## 2021-07-27 NOTE — PROGRESS NOTES
Above noted--patient continues to refuse blood work and further imaging studies. Code status limited. Attempted to reach daughter, Lakia Christine [phone] but robot (google assistant) answered. Consider palliative care involvement and placement.

## 2021-07-27 NOTE — PROGRESS NOTES
RN called Palliative Care to give update of current patient condition. RN left contact information and request for return call on VMM and await return call.

## 2021-07-27 NOTE — PROGRESS NOTES
Notified by Jam Langston at 5797 that pt HR in the 170s and trending upward. This RN and another RN went to evaluate the pt. Vital signs, see flowsheets. Pt denies any chest pain or discomfort. Dr. Estrellita Cristina called at the bedside, see new orders. Cardizem gtt started at 5 mg/hr with a 10 mg bolus IV. After 30 min pt HR not changed. Cardizem titrated accordingly, see MAR. Will continue to monitor pt closely.

## 2021-07-27 NOTE — FLOWSHEET NOTE
cardizem gtt titrated accordingly       07/27/21 0415   Vital Signs   Temp 99.9 °F (37.7 °C)   Temp Source Axillary   Pulse 163   Heart Rate Source Monitor   Resp 22   /62   MAP (mmHg) 71   Level of Consciousness Alert (0)   MEWS Score 5

## 2021-07-28 NOTE — CARE COORDINATION
7/28/21 1438 CM note: Discharge plan remains [facility]. Discharge held yesterday d/t recurrent afib rvr- cardiology consulted and patient started on po amiodarone. Transport placed on WILL CALL with [facility]. Ambulance form on soft chart. HENS completed.  Electronically signed by Liam Yancey RN on 7/28/2021 at 2:41 PM

## 2021-07-28 NOTE — PROGRESS NOTES
bruit.  ABDOMEN:  Soft, nontender, no masses, no hepatomegaly, no splenomegaly, BS+  MUSCULOSKELETAL:  No clubbing no cyanosis. there is no redness, warmth, or swelling of the joints  NEUROLOGIC: Disoriented  SKIN:  no bruising or bleeding, normal skin color, texture, turgor and no redness, warmth, or swelling      Cardiographics  I personally reviewed the telemetry monitor strips with the following interpretation: Sinus rhythm    Echocardiogram: 5/29/2021,2-D MEASUREMENTS:  Left ventricular outflow tract 2.0 cm.  Right  ventricle diastole 3.1.  Left ventricle diastole 4.4, systole 3.0. Septal and posterior wall thickness of the left ventricle is 1.4 cm. Left atrial dimension 4.4 cm.  Right atrial area 20 cm2.  Aortic root  diameter 3.9 cm.  Aortic valve cusp separation 1.9 cm.     IMPRESSION:  1.  The right ventricle is mildly dilated as is the left and right  atria.  The left ventricle and aortic root are still within normal  limits. 2.  The left ventricle shows concentric left ventricular hypertrophy at  1.4 cm.  Systolic function is well preserved.  Ejection fraction 60%. No focal wall motion abnormality is seen. Jose Daphne is diastolic filling  dysfunction stage I.  3.  The mitral valve shows mild mitral annular calcification.  Maximal  gradient 7.8 mmHg.  Mitral valve area by pressure half time 5 cm2. There is no significant mitral stenosis.  There is 1+ mitral  regurgitation. 4.  The aortic valve is sclerotic but the leaflets open well.  Maximal  gradient 4.7 mmHg.  No significant aortic stenosis nor insufficiency is  seen.  Aortic valve area 2.4 cm2.  5.  There is no pulmonic stenosis nor insufficiency.  There is trace  tricuspid regurgitation.  Pulmonary pressure is estimated at 18 mmHg. 6. Jose Tomball is no significant pericardial effusion nor any definite  intracavitary mass or thrombus or shunt identified.       Imaging  CT Head WO Contrast   Final Result   CT head without contrast:      1.  Small area

## 2021-07-28 NOTE — PLAN OF CARE
Problem: Skin Integrity:  Goal: Will show no infection signs and symptoms  Description: Will show no infection signs and symptoms  Outcome: Met This Shift  Goal: Absence of new skin breakdown  Description: Absence of new skin breakdown  Outcome: Met This Shift     Problem: Pain:  Goal: Pain level will decrease  Description: Pain level will decrease  Outcome: Met This Shift  Goal: Control of acute pain  Description: Control of acute pain  Outcome: Met This Shift  Goal: Control of chronic pain  Description: Control of chronic pain  Outcome: Met This Shift     Problem: Cardiac:  Goal: Ability to maintain an adequate cardiac output will improve  Description: Ability to maintain an adequate cardiac output will improve  Outcome: Met This Shift  Goal: Hemodynamic stability will improve  Description: Hemodynamic stability will improve  Outcome: Met This Shift     Problem: Fluid Volume:  Goal: Ability to achieve and maintain adequate urine output will improve  Description: Ability to achieve and maintain adequate urine output will improve  Outcome: Met This Shift     Problem: Respiratory:  Goal: Respiratory status will improve  Description: Respiratory status will improve  Outcome: Met This Shift

## 2021-07-28 NOTE — PROGRESS NOTES
HOSPITAL   PROGRESS NOTE. SUBJECTIVE:  Early Jumper, 80 y.o., male C/O  DISCHARGE WAS HELD BECAUSE OF EPISODE OF FAST PULSE RATE. CARDIOLOGY WAS CONSULTED. CONDITION DISCUSSED WITH  PATIENT  AND NURSING   STAFF. CONSULT NOTES REVIEWED. ROS:GENERAL- APPETITE- GOOD. BOWEL MOVEMENTS- NORMAL. NO URINE    PROBLEM. EENT: NORMAL            CVS: NORMAL. NO CHEST PAIN OR PALPITATION. RESPIRATORY:NO SOB. GI/: NO ABDOMINAL PAINS            MUSCULOSKELETAL: NO COMPLAIN            CNS: NO  COMPLAIN            OBJECTIVE:    GENERAL:Temperature:  Current - Temp: 99.9 °F (37.7 °C); Max - Temp  Av.3 °F (37.4 °C)  Min: 98.3 °F (36.8 °C)  Max: 100.8 °F (38.2 °C)  Respiratory Rate : Resp  Av.6  Min: 18  Max: 24  Pulse Range: Pulse  Av.3  Min: 82  Max: 160  Blood Presuure Range:  Systolic (33VEY), WAD:787 , Min:107 , LOD:111   ; Diastolic (75RXG), LEC:71, Min:49, Max:65    Pulse ox Range: SpO2  Av.9 %  Min: 91 %  Max: 96 %  24hr I & O:      Intake/Output Summary (Last 24 hours) at 2021 1134  Last data filed at 2021 1345  Gross per 24 hour   Intake 0 ml   Output    Net 0 ml       CONSTITUTIONALl:FAIRLY  WELL DEVELOPED,ORIENTED,CO-OPERATIVE  HEENT:NORMAL. NECK:  supple, symmetrical, trachea midline,Carotids- normal,JVP- flat  HEMATOLOGIC/LYMPHATICS:  no cervical lymphadenopathy  LUNGS:clear  CARDIOVASCULAR:  Normal apical impulse, regular rate and rhythm, normal S1 and S2, no S3 or S4, and no murmur noted  ABDOMEN:  normal bowel sounds, non-distended, non-tender, no masses palpated  EXTREMITIES: ARTHRITIC CHANGES. MOVEMENTS-LIMITED. PEDAL PULSES-FAIR.   SKIN:  normal skin color, texture, turgor          ASSESSMENT:    Active Hospital Problems    Diagnosis Date Noted    Acquired hypothyroidism [E03.9]      Priority: High     Class: Chronic    Essential hypertension [I10]      Priority: High     Class: Chronic    Paroxysmal atrial fibrillation (HCC) [I48.0]     Coronary artery disease involving native coronary artery of native heart without angina pectoris [I25.10]     Hypotension [I95.9]     Benign hypertension with chronic kidney disease, stage IV (HCC) [I12.9, N18.4]     Ischemic stroke ([facility] [I63.9] 07/23/2021       PLAN: CONTINUE CURRENT MEDICATIONS AND Rx.AWAIT CARDIOLOGY EVALUATION AND RECOMMENDATION.       Electronically signed by Oliva Pan MD on 7/28/21 at 11:34 AM EDT

## 2021-07-28 NOTE — PROGRESS NOTES
RN updated physician that per patient's wife stated that the patient only takes gabapentin daily. 7/28/2021 afternoon dose was held. See new orders.

## 2021-07-28 NOTE — PROGRESS NOTES
Physical Therapy Treatment Note/Plan of Care    Room #:  0352/8153-95  Patient Name: Laney Zambrano  YOB: 1928  MRN: [id number]    Date of Service: 7/28/2021     Tentative placement recommendation: Inpatient Rehab  Equipment recommendation: None      Evaluating Physical Therapist: Lizbeth Pappas, PT, DPT     Specific Provider Orders/Date/Referring Provider :   07/23/21 2000   PT evaluation and treat Start: 07/23/21 2000, End: 07/23/21 2000, ONE TIME, Standing Count: 1 Occurrences, R    Darell Hotter, DO Acknowledge New    Admitting Diagnosis:   Ischemic stroke ([facility] [I63.9]     Visit Diagnoses       Codes    Syncope and collapse    -  Primary R55    Cerebrovascular accident (CVA), unspecified mechanism ([address]     I63.9        Surgery: None    Patient Active Problem List   Diagnosis    GERD (gastroesophageal reflux disease)    Acquired hypothyroidism    Essential hypertension    Colitis    Atrial fibrillation with RVR ([address]    CKD (chronic kidney disease) stage 4, GFR 15-29 ml/min ([address]    NSTEMI (non-ST elevated myocardial infarction) ([address]    Primary hypercholesterolemia    Impaired mobility and activities of daily living    Ischemic stroke ([address]    Paroxysmal atrial fibrillation ([address]    Coronary artery disease involving native coronary artery of native heart without angina pectoris    Hypotension    Benign hypertension with chronic kidney disease, stage IV ([address]   None    ASSESSMENT of Current Deficits Patient exhibits decreased strength, balance, endurance and coordination impairing functional mobility, transfers, gait , gait distance and tolerance to activity. Patient's speech garbled intermittently as well as being confused. Pt displays tremors with supine exercises in all 4 extremities. Pt not able to actively participate with any exercises due to his confusion.       PHYSICAL THERAPY  PLAN OF CARE       Physical therapy plan of care is established based on physician order, patient diagnosis and clinical assessment    Current Treatment Recommendations:    -Bed Mobility: Lower extremity exercises  and Trunk control activities   -Sitting Balance: Incorporate reaching activities to activate trunk muscles , Hands on support to maintain midline , Facilitate active trunk muscle engagement , Facilitate postural control in all planes  and Engage in core activities to allow for movement within base of support   -Standing Balance: Perform strengthening exercises in standing to promote motor control with or without upper extremity support , Instruct patient on adequate base of support to maintain balance and Challenge balance utilizing reaching  activities beyond center of gravity    -Transfers: Provide instruction on proper hand and foot position for adequate transfer of weight onto lower extremities and use of gait device, Cues for hand placement, technique and safety, Facilitate weight shift forward on to lower extremities and provide necessary stabilization of bilateral lower extremities , Support transfer of weight on to lower extremities, Assist with extension of knees trunk and hip to accept weight transfer  and Provide stabilization to prevent fall   -Gait: Gait training, Standing activities to improve: base of support, weight shift, weight bearing , Exercises to improve trunk control, Exercises to improve hip and knee control, Performance of protected weight bearing activities and Activities to increase weight bearing   -Endurance: Utilize Supervised activities to increase level of endurance to allow for safe functional mobility including transfers and gait     PT long term treatment goals are located in below grid    Patient and or family understand(s) diagnosis, prognosis, and plan of care. Frequency of treatments: Patient will be seen  1-2x per day. Prior Level of Function: Patient ambulated with wheeled walker for short distances < 20'.   Rehab Potential: good  for baseline    Past medical history:   Past Medical History:   Diagnosis Date    Acute blood loss anemia 2017    Acute MI ([facility] 2017    Atrial fibrillation with rapid ventricular response (HCC)     Basal cell carcinoma 2018    Chronic kidney disease     Colitis     Diabetes ([facility]     GERD (gastroesophageal reflux disease)     Hypertension     Liver abscess 2014    Overactive bladder     Thyroid disease      Past Surgical History:   Procedure Laterality Date    APPENDECTOMY      APPENDECTOMY  193    ATHERECTOMY      BACK SURGERY      lumbar 1,7    CARDIAC CATHETERIZATION  2017    Dr. Pedro Luis Alfaro  [address], NON-  2016    Dr Hipolito Sánchez, NON-      COLONOSCOPY      CORONARY ANGIOPLASTY WITH STENT PLACEMENT Right 2017    Dr Angel Krishna x 2 LAD x 1 diagonal x 1 RCA    DIAGNOSTIC CARDIAC CATH LAB PROCEDURE  2017; 17    Dr. Mahsa Jones Bilateral     LIVER SURGERY      SKIN BIOPSY      ear    TOTAL KNEE ARTHROPLASTY Bilateral        SUBJECTIVE:    Precautions: Syncope, 2L of O2, falls and O2 ,   Social history: Patient lives alone in a ranch home  with 2 steps  to enter with Rail  Tub shower grab bars    Equipment owned: Rollator, Shower chair and [employer],      [address]        AM-[facility] Mobility Inpatient   How much difficulty turning over in bed?: A Lot  How much difficulty sitting down on / standing up from a chair with arms?: Unable  How much difficulty moving from lying on back to sitting on side of bed?: A Lot  How much help from another person moving to and from a bed to a chair?: Total  How much help from another person needed to walk in hospital room?: Total  How much help from another person for climbing 3-5 steps with a railing?: Total  AM-PAC Inpatient Mobility Raw Score : 8  AM-PAC Inpatient T-Scale Score : 28.52  Mobility Inpatient CMS 0-100% Score: 86.62  Mobility Inpatient CMS G-Code Modifier : CM    Nursing cleared patient for PT treatment. .     OBJECTIVE;   Initial Evaluation  Date: 7/24/2021 Treatment Date:  7/28/2021       Short Term/ Long Term   Goals   Was pt agreeable to Eval/treatment? Yes  Not able to answer appropriately d/t confusion To be met in 4 days   Pain level   5/10  \"all over\" 0/10    Bed Mobility    Rolling: Minimal assist of 1   Supine to sit: Minimal assist of 1   Sit to supine: Minimal assist of 1   Scooting: Minimal assist of 1  Rolling: Not assessed    Supine to sit: Not assessed    Sit to supine: Not assessed    Scooting: Not assessed     Rolling: Independent   Supine to sit:  Independent   Sit to supine: Independent   Scooting: Independent    Transfers Sit to stand: Minimal assist of 1  Sit to stand: Not assessed       Sit to stand: Independent    Ambulation    3-5 side steps using  wheeled walker with Minimal assist of 1   cues for sequencing, walker approximation, safety and proper hand placement not assessed     > 20 feet using  wheeled walker with Independent    Stair negotiation: ascended and descended   Not assessed      2 steps with 1 HR   ROM Within functional limits    Increase range of motion 10% of affected joints    Strength BUE:  refer to OT eval  RLE:  3+/5  LLE:  4-/5  Increase strength in affected mm groups by 1/3 grade   Balance Sitting EOB:  fair   Dynamic Standing:  fair - Sitting EOB: not assessed   Dynamic Standing: not assessed    Sitting EOB:  fair+  Dynamic Standing: fair      Patient is Alert & Oriented x person and does not follow directions   Sensation:  Patient  denies numbness/tingling   Edema:  yes right lower extremity  Endurance: poor    Vitals: 7 liters nasal cannula   Blood Pressure at rest  Blood Pressure during session    Heart Rate at rest  Heart Rate during session    SPO2 at rest % SPO2 during session %     Patient education  Patient educated on role of Physical Therapy, risks of immobility, safety and plan of care, energy conservation, importance of positional changes for oxygen exchange,  importance of mobility while in hospital , purse lip breathing, safety  and O2 line management and safety      Patient response to education:   Pt verbalized understanding Pt demonstrated skill Pt requires further education in this area   Yes Partial Yes      Treatment:  Patient practiced and was instructed/facilitated in the following treatment: Patient  performed supine exercises. Therapeutic Exercises:  ankle pumps, heel slide, hip abduction/adduction and straight leg raise, SAQ, x 15 reps. PROM. At end of session, patient in bed with alarm call light and phone within reach, all lines and tubes intact, nursing notified. Patient would benefit from continued skilled Physical Therapy to improve functional independence and quality of life. Patient's/ family goals   get stronger      Time in  8:20  Time out  8:29    Total Treatment Time  9 minutes        CPT codes:    Therapeutic exercises (95411)   9 minutes  1 unit(s)   Maurice Cunningham  [facility]  LIC # [id number]

## 2021-07-29 NOTE — PROGRESS NOTES
Speech Language Pathology      NAME:  Festus Cantu  :  1928  DATE: 2021  ROOM:  [address] [place], [place] [address]-    Patient seen for speech and swallow therapy 30 minutes. OT present for part of session to improve alertness, facilitate safe self feeding and improve positioning. Patient tolerated puree and thin however he was not able to consistently feed himself. He demonstrated very poor awareness of task despite maximal verbal and tactile cues. He keeps his eyes closed most of the time. With cues to open he will raise his eyebrows however his eyes mostly remain shut. Verbal interactions fluctuate from briefly on topic to confusion.   He attempts to initiate a task when given a direction however he frequently unable to physically coordinate the movements to execute it accurately     Ischemic stroke [facility]) [I63.9]    48817  speech/language tx  08414  dysphagia tx    Arthurine Carrie MSCCC/SLP  Speech Language Pathologist  [id number]

## 2021-07-29 NOTE — PROGRESS NOTES
S: no new complaints offered  Objective:      Neuro exam 112/56 p 86 t 99.7 (Tmax 100.1)  General: alert and follows simple commands with intact language  Cranial nerve testing was normal.  Funduscopic eye exam revealed not testable. Motor exam: 5-/5. Deep tendon reflexes were absent bilaterally. Plantar responses were flexor bilaterally. Cerebellar exam noted dysmetria on finger to nose on the left. Sensation was normal to joint position sense, light touch and a pin prick . .        Assessment:   Left cerebellar/pontine infarct with JOSEFA  PAF        Plan:   As per cardiology(antiplatelet therapy) for PAF; ongoing bp control. Presently refusing MRI studies; need to consider code status issues. If carotid us shows significant stenosis then vascular surgery evaluation (has refused study)  PT/?rehab  Consider ID consult for low grade temp.   Recommend stroke clinic follow up at [facility] post discharge  Will see prn

## 2021-07-29 NOTE — PROGRESS NOTES
Physical Therapy Treatment Note/Plan of Care    Room #:  0559/5720-81  Patient Name: Adiel Lynch  YOB: 1928  MRN: [id number]    Date of Service: 7/29/2021     Tentative placement recommendation: Inpatient Rehab  Equipment recommendation: None      Evaluating Physical Therapist: Car Her, PT, DPT     Specific Provider Orders/Date/Referring Provider :   07/23/21 2000   PT evaluation and treat Start: 07/23/21 2000, End: 07/23/21 2000, ONE TIME, Standing Count: 1 Occurrences, R    Reyna Alcantara, DO Acknowledge New    Admitting Diagnosis:   Ischemic stroke ([facility] [address] [I63.9]     Visit Diagnoses       Codes    Syncope and collapse    -  Primary R55    Cerebrovascular accident (CVA), unspecified mechanism ([address]     I63.9        Surgery: None    Patient Active Problem List   Diagnosis    GERD (gastroesophageal reflux disease)    Acquired hypothyroidism    Essential hypertension    Colitis    Atrial fibrillation with RVR ([address]    CKD (chronic kidney disease) stage 4, GFR 15-29 ml/min ([address]    NSTEMI (non-ST elevated myocardial infarction) ([address]    Primary hypercholesterolemia    Impaired mobility and activities of daily living    Ischemic stroke ([address]    Paroxysmal atrial fibrillation ([address]    Coronary artery disease involving native coronary artery of native heart without angina pectoris    Hypotension    Benign hypertension with chronic kidney disease, stage IV ([address]   None    ASSESSMENT of Current Deficits Patient exhibits decreased strength, balance, endurance and coordination impairing functional mobility, transfers, gait , gait distance and tolerance to activity. Patient needing maximal assist for trunk flexion and bilateral lower extremities for bed mobility. Pt needing minimal/moderate assist for sitting balance due to posterior/left lateral lean. Pt displays tremors with all 4 extremities throughout tx session.       PHYSICAL THERAPY  PLAN OF CARE       Physical therapy plan of care is established based on physician order,  patient diagnosis and clinical assessment    Current Treatment Recommendations:    -Bed Mobility: Lower extremity exercises  and Trunk control activities   -Sitting Balance: Incorporate reaching activities to activate trunk muscles , Hands on support to maintain midline , Facilitate active trunk muscle engagement , Facilitate postural control in all planes  and Engage in core activities to allow for movement within base of support   -Standing Balance: Perform strengthening exercises in standing to promote motor control with or without upper extremity support , Instruct patient on adequate base of support to maintain balance and Challenge balance utilizing reaching  activities beyond center of gravity    -Transfers: Provide instruction on proper hand and foot position for adequate transfer of weight onto lower extremities and use of gait device, Cues for hand placement, technique and safety, Facilitate weight shift forward on to lower extremities and provide necessary stabilization of bilateral lower extremities , Support transfer of weight on to lower extremities, Assist with extension of knees trunk and hip to accept weight transfer  and Provide stabilization to prevent fall   -Gait: Gait training, Standing activities to improve: base of support, weight shift, weight bearing , Exercises to improve trunk control, Exercises to improve hip and knee control, Performance of protected weight bearing activities and Activities to increase weight bearing   -Endurance: Utilize Supervised activities to increase level of endurance to allow for safe functional mobility including transfers and gait     PT long term treatment goals are located in below grid    Patient and or family understand(s) diagnosis, prognosis, and plan of care. Frequency of treatments: Patient will be seen  1-2x per day.          Prior Level of Function: Patient ambulated with wheeled walker for short distances < '.  Rehab Potential: good  for baseline    Past medical history:   Past Medical History:   Diagnosis Date    Acute blood loss anemia 2017    Acute MI ([facility] [address] 2017    Atrial fibrillation with rapid ventricular response (HCC)     Basal cell carcinoma 2018    Chronic kidney disease     Colitis     Diabetes ([facility] [address]     GERD (gastroesophageal reflux disease)     Hypertension     Liver abscess     Overactive bladder     Thyroid disease      Past Surgical History:   Procedure Laterality Date    APPENDECTOMY      APPENDECTOMY  193    ATHERECTOMY      BACK SURGERY      lumbar 1,7    CARDIAC CATHETERIZATION  2017    Dr. Samuel Smith  [address], NON-  2016    Dr Orestes Clemente, NON-      COLONOSCOPY      CORONARY ANGIOPLASTY WITH STENT PLACEMENT Right 2017    Dr Huber Gains x 2 LAD x 1 diagonal x 1 RCA    DIAGNOSTIC CARDIAC CATH LAB PROCEDURE  2017; 17    Dr. Deyanira Rutledge Bilateral     LIVER SURGERY      SKIN BIOPSY      ear    TOTAL KNEE ARTHROPLASTY Bilateral        SUBJECTIVE:    Precautions: Syncope, 2L of O2, falls and O2 ,   Social history: Patient lives alone in a ranch home  with 2 steps  to enter with Rail  Tub shower grab bars    Equipment owned: Rollator, Shower chair and [employer],      [address]       AM-PAC Mobility Inpatient   How much difficulty turning over in bed?: A Lot  How much difficulty sitting down on / standing up from a chair with arms?: Unable  How much difficulty moving from lying on back to sitting on side of bed?: A Lot  How much help from another person moving to and from a bed to a chair?: Total  How much help from another person needed to walk in hospital room?: Total  How much help from another person for climbing 3-5 steps with a railing?: Total  AM-PAC Inpatient Mobility Raw Score : 8  AM-PAC Inpatient T-Scale Score : 28.52  Mobility Inpatient CMS 0-100% Score: 86.62  Mobility Inpatient CMS G-Code Modifier : CM    Nursing cleared patient for PT treatment. .     OBJECTIVE;   Initial Evaluation  Date: 7/24/2021 Treatment Date:  7/29/2021       Short Term/ Long Term   Goals   Was pt agreeable to Eval/treatment? Yes  yes To be met in 4 days   Pain level   5/10  \"all over\" No number given  Pain with any movement    Bed Mobility    Rolling: Minimal assist of 1   Supine to sit: Minimal assist of 1   Sit to supine: Minimal assist of 1   Scooting: Minimal assist of 1  Rolling: Maximal assist of 1   Supine to sit: Maximal assist of 1   Sit to supine: Maximal assist of 1   Scooting: Maximal assist of 1    Rolling: Independent   Supine to sit:  Independent   Sit to supine: Independent   Scooting: Independent    Transfers Sit to stand: Minimal assist of 1  Sit to stand: Not assessed       Sit to stand: Independent    Ambulation    3-5 side steps using  wheeled walker with Minimal assist of 1   cues for sequencing, walker approximation, safety and proper hand placement not assessed     > 20 feet using  wheeled walker with Independent    Stair negotiation: ascended and descended   Not assessed      2 steps with 1 HR   ROM Within functional limits    Increase range of motion 10% of affected joints    Strength BUE:  refer to OT eval  RLE:  3+/5  LLE:  4-/5  Increase strength in affected mm groups by 1/3 grade   Balance Sitting EOB:  fair   Dynamic Standing:  fair - Sitting EOB: fair - posterior/left lateral lean  Dynamic Standing: not assessed    Sitting EOB:  fair+  Dynamic Standing: fair      Patient is Alert & Oriented x person and place and follows one step directions   Sensation:  Patient  denies numbness/tingling   Edema:  yes right lower extremity  Endurance: poor    Vitals: 6 liters nasal cannula   Blood Pressure at rest  Blood Pressure during session    Heart Rate at rest  Heart Rate during session    SPO2 at rest 95% SPO2 during session %     Patient education  Patient educated on role of Physical Therapy, risks of immobility, safety and plan of care, energy conservation, importance of positional changes for oxygen exchange,  importance of mobility while in hospital , purse lip breathing, safety  and O2 line management and safety      Patient response to education:   Pt verbalized understanding Pt demonstrated skill Pt requires further education in this area   Yes Partial Yes      Treatment:  Patient practiced and was instructed/facilitated in the following treatment: Patient assisted to edge of bed. Patient  Sat edge of bed 7 minutes with Moderate assist of 1 to increase dynamic sitting balance and activity tolerance. Pt worked on sitting balance due to posterior/left lateral lean. Pt requested to lay back down after 7 minutes due to fatigue. Pt rolled to change bed linens. Therapeutic Exercises:  not performed    At end of session, patient in bed with alarm call light and phone within reach, all lines and tubes intact, nursing notified. Patient would benefit from continued skilled Physical Therapy to improve functional independence and quality of life. Patient's/ family goals   get stronger      Time in  10:00  Time out  10:12    Total Treatment Time  12 minutes        CPT codes:    Therapeutic activities (05701)   12 minutes  1 unit(s)   Megan Cunningham  \[facility]\""  LIC # [id number]

## 2021-07-29 NOTE — PROGRESS NOTES
([facility] [I48.0]     Coronary artery disease involving native coronary artery of native heart without angina pectoris [I25.10]     Hypotension [I95.9]     Benign hypertension with chronic kidney disease, stage IV (HCC) [I12.9, N18.4]     Ischemic stroke ([facility] [I63.9] 07/23/2021       PLAN: CONTINUE CURRENT MEDICATIONS AND Rx.AWAIT FURTHER CARDIOLOGY RECOMMENDATION.       Electronically signed by Rishi Romo MD on 7/29/21 at 11:32 AM EDT

## 2021-07-29 NOTE — CARE COORDINATION
7/29/21 1449 CM note: COVID (-) 7/25. Discharge plan remains [facility]. Discharge held 2 days ago d/t recurrent afib rvr- cardiology consulted and patient started on po amiodarone. TSH low yesterday- patient started on synthroid. When ready to discharge WILL NEED SIGNED MINO. Transport placed on WILL CALL with [facility]. Ambulance form on soft chart (discharge dates will need updated on this form). HENS completed. RN to call N/N to [address] [place] [address] at [phone].  Electronically signed by Lizzeth Mon RN on 7/29/2021 at 2:54 PM

## 2021-07-29 NOTE — PROGRESS NOTES
Patient is seen in follow-up for atrial fibrillation with RVR    Subjective:     Mr. Sweeney Space in bed, no apparent distress    ROS:  Unable to obtain since patient is disoriented      Scheduled Meds:   levothyroxine  100 mcg Oral Daily    gabapentin  300 mg Oral Daily    amiodarone  200 mg Oral BID    Followed by   Tahira Sin ON 8/2/2021] amiodarone  200 mg Oral Daily    levoFLOXacin  500 mg Oral Every Other Day    acetaminophen  650 mg Oral QAM AC    atorvastatin  40 mg Oral Nightly    Vitamin D  5,000 Units Oral Daily    clopidogrel  75 mg Oral Daily    ferrous sulfate  325 mg Oral Daily    isosorbide mononitrate  30 mg Oral Daily    metoprolol succinate  50 mg Oral Daily    vitamin B-12  1,000 mcg Oral Daily    sodium chloride flush  5-40 mL Intravenous 2 times per day    enoxaparin  30 mg Subcutaneous Daily     Continuous Infusions:   sodium chloride       PRN Meds:ipratropium-albuterol, acetaminophen, nitroGLYCERIN, sodium chloride flush, sodium chloride, ondansetron **OR** ondansetron, polyethylene glycol, perflutren lipid microspheres, labetalol, polyvinyl alcohol    I/O last 3 completed shifts: In: 330 [P.O.:320; I.V.:10]  Out: -   No intake/output data recorded.       Objective:      Physical Exam:   /61   Pulse 83   Temp 99.7 °F (37.6 °C) (Axillary)   Resp 18   Ht 5' 8\" (1.727 m)   Wt 196 lb 14.4 oz (89.3 kg)   SpO2 95%   BMI 29.94 kg/m²   CONSTITUTIONAL: Appears stated age  HEAD:  normocepalic, without obvious abnormality, atraumatic  NECK:  Supple, symmetrical, trachea midline, no adenopathy, thyroid symmetric, not enlarged and no tenderness, skin normal  LUNGS:  No increased work of breathing, No accessory muscle use or intercostal retractions, good air exchange, clear to auscultation bilaterally, no crackles or wheezing  CARDIOVASCULAR:  Normal apical impulse, regular rate and rhythm, normal S1 and S2, no S3 or S4, 2/6 systolic murmur at the apex, no edema, no JVD, no carotid bruit. ABDOMEN:  Soft, nontender, no masses, no hepatomegaly, no splenomegaly, BS+  MUSCULOSKELETAL:  No clubbing no cyanosis. there is no redness, warmth, or swelling of the joints  NEUROLOGIC: Alert, awake  SKIN:  no bruising or bleeding, normal skin color, texture, turgor and no redness, warmth, or swelling      Cardiographics  I personally reviewed the telemetry monitor strips with the following interpretation: Sinus rhythm    Echocardiogram: 5/29/2021,2-D MEASUREMENTS:  Left ventricular outflow tract 2.0 cm.  Right  ventricle diastole 3.1.  Left ventricle diastole 4.4, systole 3.0. Septal and posterior wall thickness of the left ventricle is 1.4 cm. Left atrial dimension 4.4 cm.  Right atrial area 20 cm2.  Aortic root  diameter 3.9 cm.  Aortic valve cusp separation 1.9 cm.     IMPRESSION:  1.  The right ventricle is mildly dilated as is the left and right  atria.  The left ventricle and aortic root are still within normal  limits. 2.  The left ventricle shows concentric left ventricular hypertrophy at  1.4 cm.  Systolic function is well preserved.  Ejection fraction 60%. No focal wall motion abnormality is seen. [place] is diastolic filling  dysfunction stage I.  3.  The mitral valve shows mild mitral annular calcification.  Maximal  gradient 7.8 mmHg.  Mitral valve area by pressure half time 5 cm2. There is no significant mitral stenosis.  There is 1+ mitral  regurgitation. 4.  The aortic valve is sclerotic but the leaflets open well.  Maximal  gradient 4.7 mmHg.  No significant aortic stenosis nor insufficiency is  seen.  Aortic valve area 2.4 cm2.  5.  There is no pulmonic stenosis nor insufficiency.  There is trace  tricuspid regurgitation.  Pulmonary pressure is estimated at 18 mmHg. 6. [place] is no significant pericardial effusion nor any definite  intracavitary mass or thrombus or shunt identified.       Imaging  CT Head WO Contrast   Final Result   CT head without contrast:      1. Small area of hypodensity in the left middle cerebellar peduncle and left   cerebellar hemisphere may represent artifact or an acute/early subacute   infarction. Further evaluation with MRI is recommended. 2. No intracranial hemorrhage, mass effect or midline shift. 3.  Disproportionately prominent volume loss in the medial temporal lobes, as   may be seen with Alzheimer's disease and other dementias. Clinical   correlation is needed. CT cervical spine without contrast:      1. No fracture or joint dislocation is seen. 2. Degenerative changes, as described. CT CERVICAL SPINE WO CONTRAST   Final Result   CT head without contrast:      1. Small area of hypodensity in the left middle cerebellar peduncle and left   cerebellar hemisphere may represent artifact or an acute/early subacute   infarction. Further evaluation with MRI is recommended. 2. No intracranial hemorrhage, mass effect or midline shift. 3.  Disproportionately prominent volume loss in the medial temporal lobes, as   may be seen with Alzheimer's disease and other dementias. Clinical   correlation is needed. CT cervical spine without contrast:      1. No fracture or joint dislocation is seen. 2. Degenerative changes, as described. XR CHEST 1 VIEW   Final Result   No acute abnormality identified. VL DUP CAROTID BILATERAL    (Results Pending)       Lab Review   Lab Results   Component Value Date     07/23/2021    K 4.3 07/23/2021     07/23/2021    CO2 24 07/23/2021    BUN 20 07/23/2021    CREATININE 2.5 07/23/2021    GLUCOSE 102 07/23/2021    GLUCOSE 122 04/10/2012    CALCIUM 9.2 07/23/2021     Lab Results   Component Value Date    WBC 12.1 07/24/2021    HGB 11.5 07/24/2021    HCT 38.7 07/24/2021    MCV 81.6 07/24/2021     07/24/2021     I have personally reviewed the laboratory, cardiac diagnostic and radiographic testing as outlined above:    Assessment:     1.   Atrial fibrillation: Paroxysmal, now in sinus rhythm, will continue current treatment, declined anticoagulation in the past.  2.  CAD: Cardiac catheterization on (4/20/2017) Significant LAD disease with successful deployment of 3.5 mm x 28 mm and 3.5 mm x 12 mm Vision bare metal stent, Successful percutaneous coronary intervention with deployment of 2.25 mm x 18 mm bare metal stent in first diagonal branch, Successful deployment of a 2.5 mm x 15 mm Vision bare metal stent in mid RCA. The patient had in-stent thrombosis of the RCA stent, had to have another PCI, doing okay we will continue current treatment         3. History of hypertension: Controlled      4. Type 2 diabetes mellitus  5. Chronic kidney disease stage IV  6. Hypothyroidism: On Synthroid supplements, now has low TSH    Recommendations:     1. Continue current treatment  Can be discharged from cardiac standpoint    No family at bedside  Discussed with nursing staff  Electronically signed by Sheryn Duverney, MD on 7/29/2021 at 6:52 PM  NOTE: This report was transcribed using voice recognition software.  Every effort was made to ensure accuracy; however, inadvertent computerized transcription errors may be present

## 2021-07-29 NOTE — PROGRESS NOTES
Patient discharged to [facility]. [facility] ambulance to  20:00. 7/29/202. Daughter was updated on transfer.

## 2021-07-29 NOTE — PROGRESS NOTES
Physical Therapy Treatment Note/Plan of Care    Room #:  1031/2034-63  Patient Name: Micaela Fournier  YOB: 1928  MRN: [id number]    Date of Service: 7/29/2021     Tentative placement recommendation: Inpatient Rehab  Equipment recommendation: None      Evaluating Physical Therapist: Maci Sanders, PT, DPT     Specific Provider Orders/Date/Referring Provider :   07/23/21 2000   PT evaluation and treat Start: 07/23/21 2000, End: 07/23/21 2000, ONE TIME, Standing Count: 1 Occurrences, R    Marcos Salazar DO Acknowledge New    Admitting Diagnosis:   Ischemic stroke [facility]) [I63.9]       Surgery: None    Patient Active Problem List   Diagnosis    GERD (gastroesophageal reflux disease)    Acquired hypothyroidism    Essential hypertension    Colitis    Syncope and collapse    [facility] [address]    CKD (chronic kidney disease) stage 4, GFR 15-29 ml/min ([address]    NSTEMI (non-ST elevated myocardial infarction) ([address]    Primary hypercholesterolemia    Impaired mobility and activities of daily living    Cerebrovascular accident (CVA) ([address]    Paroxysmal atrial fibrillation ([address]    Coronary artery disease involving native coronary artery of native heart without angina pectoris    Hypotension    Benign hypertension with chronic kidney disease, stage IV ([address]   None    ASSESSMENT of Current Deficits Patient exhibits decreased strength, balance, endurance and coordination impairing functional mobility, transfers, gait , gait distance and tolerance to activity. Attempted dynamic sitting strong posterior lean, however patient dependent of 1, needing max assist of 2 to get back to supine. Pt displays tremors with all 4 extremities throughout tx session. Patient needing to be more alert for function next session.       PHYSICAL THERAPY  PLAN OF CARE       Physical therapy plan of care is established based on physician order,  patient diagnosis and clinical assessment    Current Treatment Recommendations:    -Bed Mobility: Lower extremity exercises  and Trunk control activities   -Sitting Balance: Incorporate reaching activities to activate trunk muscles , Hands on support to maintain midline , Facilitate active trunk muscle engagement , Facilitate postural control in all planes  and Engage in core activities to allow for movement within base of support   -Standing Balance: Perform strengthening exercises in standing to promote motor control with or without upper extremity support , Instruct patient on adequate base of support to maintain balance and Challenge balance utilizing reaching  activities beyond center of gravity    -Transfers: Provide instruction on proper hand and foot position for adequate transfer of weight onto lower extremities and use of gait device, Cues for hand placement, technique and safety, Facilitate weight shift forward on to lower extremities and provide necessary stabilization of bilateral lower extremities , Support transfer of weight on to lower extremities, Assist with extension of knees trunk and hip to accept weight transfer  and Provide stabilization to prevent fall   -Gait: Gait training, Standing activities to improve: base of support, weight shift, weight bearing , Exercises to improve trunk control, Exercises to improve hip and knee control, Performance of protected weight bearing activities and Activities to increase weight bearing   -Endurance: Utilize Supervised activities to increase level of endurance to allow for safe functional mobility including transfers and gait     PT long term treatment goals are located in below grid    Patient and or family understand(s) diagnosis, prognosis, and plan of care. Frequency of treatments: Patient will be seen  1-2x per day. Prior Level of Function: Patient ambulated with wheeled walker for short distances < 20'.   Rehab Potential: good  for baseline    Past medical history:   Past Medical History:   Diagnosis Date    Acute blood loss anemia 2017    [facility] [address] 2017    Atrial fibrillation with rapid ventricular response (HCC)     Basal cell carcinoma 2018    Chronic kidney disease     Colitis     Diabetes ([facility] [address]     GERD (gastroesophageal reflux disease)     Hypertension     Liver abscess     Overactive bladder     Thyroid disease      Past Surgical History:   Procedure Laterality Date    APPENDECTOMY      APPENDECTOMY  193    ATHERECTOMY      BACK SURGERY      lumbar 1,7    CARDIAC CATHETERIZATION  2017    Dr. Balbina Silva      CHOLECYSTECTOMY      CIRCUMCISION, NON-  2016    Dr Nolberto Briggs, NON-      COLONOSCOPY      CORONARY ANGIOPLASTY WITH STENT PLACEMENT Right 2017    Dr Poncho Joe x 2 LAD x 1 diagonal x 1 RCA    DIAGNOSTIC CARDIAC CATH LAB PROCEDURE  2017; 17    Dr. Janna Alvarez Bilateral     LIVER SURGERY      SKIN BIOPSY      ear    TOTAL KNEE ARTHROPLASTY Bilateral        SUBJECTIVE:    Precautions: Syncope, 2L of O2, falls and O2 ,   Social history: Patient lives alone in a ranch home  with 2 steps  to enter with Rail  Tub shower grab bars    Equipment owned: Rollator, Shower chair and [employer],      [employer] [employer]        AM-[facility] Mobility Inpatient   How much difficulty turning over in bed?: A Lot  How much difficulty sitting down on / standing up from a chair with arms?: Unable  How much difficulty moving from lying on back to sitting on side of bed?: A Lot  How much help from another person moving to and from a bed to a chair?: Total  How much help from another person needed to walk in hospital room?: Total  How much help from another person for climbing 3-5 steps with a railing?: Total  AM-PAC Inpatient Mobility Raw Score : 8  AM-PAC Inpatient T-Scale Score : 28.52  Mobility Inpatient CMS 0-100% Score: 86.62  Mobility Inpatient CMS G-Code on role of Physical Therapy, risks of immobility, safety and plan of care, energy conservation, importance of positional changes for oxygen exchange,  importance of mobility while in hospital , purse lip breathing, safety  and O2 line management and safety      Patient response to education:   Pt verbalized understanding Pt demonstrated skill Pt requires further education in this area   Yes Partial Yes      Treatment:  Patient practiced and was instructed/facilitated in the following treatment: attempted dynamic sitting, however needing to assist patient back to supine due to strong posterior lean. Supine exercise as below. Therapeutic Exercises:  ankle pumps and straight leg raise PROM x 10 reps    At end of session, patient in bed with alarm call light and phone within reach, all lines and tubes intact, nursing notified. Patient would benefit from continued skilled Physical Therapy to improve functional independence and quality of life.          Patient's/ family goals   get stronger      Time in  339  Time out  350    Total Treatment Time  11 minutes        CPT codes:    Therapeutic activities (81405)   11 minutes  1 unit(s)   Rehan Simmons PTA  [facility]#[id number]

## 2021-07-29 NOTE — PROGRESS NOTES
Date:2021  Patient Name: Can Garcia  MRN: [id number]  : 1928  Room: [address] [place] [address]             OCCUPATIONAL THERAPY TREATMENT NOTE    [address] [place] [address] CTR  [facility]             Evaluating OT: Kitty Harvey, OTR/L; [id number]        Referring Provider and Orders/Date:   OT eval and treat Start: 21, End: 21, ONE TIME, Standing Count: 1 Occurrences, R    Angel Hays DO     Diagnosis:   1. Syncope and collapse    2. Cerebrovascular accident (CVA), unspecified mechanism [facility])             Pertinent Medical History:       Past Medical History        Past Medical History:   Diagnosis Date    Acute blood loss anemia 2017    Acute MI ([facility] [address] 2017    Atrial fibrillation with rapid ventricular response (HCC)      Basal cell carcinoma 2018    Chronic kidney disease      Colitis      Diabetes ([facility] [address] [facility]      GERD (gastroesophageal reflux disease)      Hypertension      Liver abscess     Overactive bladder      Thyroid disease               Past Surgical History         Past Surgical History:   Procedure Laterality Date    APPENDECTOMY        APPENDECTOMY   193    ATHERECTOMY       BACK SURGERY         lumbar 1,7    CARDIAC CATHETERIZATION   2017     Dr. Nola Clemente   2017    Lloyd Leticia, NON-   2016     Dr Ezequiel Jones, NON-   2015    COLONOSCOPY        CORONARY ANGIOPLASTY WITH STENT PLACEMENT Right 2017     Dr Dafne Gutiérrez x 2 LAD x 1 diagonal x 1 RCA    DIAGNOSTIC CARDIAC CATH LAB PROCEDURE   2017; 17     Dr. Gordon Noriega Bilateral      LIVER SURGERY        SKIN BIOPSY         ear    TOTAL KNEE ARTHROPLASTY Bilateral            Precautions:  Fall Risk, 6L, aphasic and possible poor historian; telesitter.      Recommended placement: IRF vs subacute Assessment of current deficits     [x]? Functional mobility           [x]? ADLs           [x]? Strength                   [x]? Cognition     [x]? Functional transfers         [x]? IADLs         [x]? Safety Awareness   [x]? Endurance     [x]? Fine Coordination                        [x]? Balance      []? Vision/perception    []? Sensation       [x]? Gross Motor Coordination            [x]? ROM           []? Delirium                   [x]?  Motor Control      OT PLAN OF CARE   OT POC based on physician orders, patient diagnosis and results of clinical assessment     Frequency/Duration 1-3 days/wk for 2 weeks PRN   Specific OT Treatment Interventions to include:   * Instruction/training on adapted ADL techniques and AE recommendations to increase functional independence within precautions       * Training on energy conservation strategies, correct breathing pattern and techniques to improve independence/tolerance for self-care routine  * Functional transfer/mobility training/DME recommendations for increased independence, safety, and fall prevention  * Patient/Family education to increase follow through with safety techniques and functional independence  * Recommendation of environmental modifications for increased safety with functional transfers/mobility and ADLs  * Cognitive retraining/development of therapeutic activities to improve problem solving, judgement, memory, and attention for increased safety/participation in ADL/IADL tasks  * Therapeutic exercise to improve motor endurance, ROM, and functional strength for ADLs/functional transfers  * Therapeutic activities to facilitate/challenge dynamic balance, stand tolerance for increased safety and independence with ADLs  * Therapeutic activities to facilitate gross/fine motor skills for increased independence with ADLs      Recommended Adaptive Equipment/DME: TBD       Home Living: Pt lives alone in a ranch home with 2 steps to enter with rail    Bathroom setup: tub shower with grab bars; shower chair    DME owned: rollator      Prior Level of Function: indep with ADLs , assist with IADLs; ambulated indep with rollator   Driving: yes   Occupation: retired   Enjoys: unknown     Pain Level: none  Cognition: A&O: 1/4 with inconsistencies throughout the session. Oriented to person; Follows 1-2 step directions; difficult to assess due to aphasia, fluctuations in status and attention              Memory:  Min impaired              Sequencing: Mod impairment              Problem solving: max impairment with impulsivity               Judgement/safety: mod impairment with fall risk     AM-[facility] Daily Activity Inpatient   How much help for putting on and taking off regular lower body clothing?: Total  How much help for Bathing?: Total  How much help for Toileting?: Total  How much help for putting on and taking off regular upper body clothing?: A Lot  How much help for taking care of personal grooming?: A Lot  How much help for eating meals?: A Lot  [facility] Daily Activity Raw Score: 9  AM-PAC Inpatient ADL T-Scale Score : 25.33  ADL Inpatient CMS 0-100% Score: 79.59  ADL Inpatient CMS G-Code Modifier : CL                   Functional Assessment:      Initial Eval Status  Date: 7/26/2021   Treatment Status  Date: 7/29/21 STGs = LTGs  Time frame: 10-14 days   Feeding Maximal Assist with hand over hand to bring items to mouth, poor use of utensils and max spillage. MAX A with hand over hand to grasp spoon, bring spoon to mouth and take bite, manipulation of wrist/forearm to range spoon to go into mouth. To hold cup bring to mouth and take drink. MAX v/c's for pt to open eyes during self feeding to encourage increased participation in task poor follow through. Min A   Grooming Maximal Assist with pt able to wash leno hand with towel and droppage noted. Not able to complete face wash or hair comb.   MAX A demo'd at bed level with pt requiring assist to grasp  Minimal Assist    UB Dressing Maximal Assist with gown from sitting EOB with pt attempting to don, but only wrapping it around his hands with poor coordination. n/t Moderate Assist    LB Dressing Dependent to don leno socks from supine. n/t  Maximal Assist    Bathing Maximal Assist with sponge bathing from supine recommended. n/t Moderate Assist    Toileting Dependent with A x 2 for hygiene in standing. Max encouragement and extended time. Not safe with walker with one person in front to stand and second person for deepak care. Recommending to complete from supine. n/t Maximal Assist    Bed Mobility  Supine to sit: Moderate Assist   Sit to supine: Moderate Assist  For both trunk and L LE  n/t due to safety  Supine to sit: Stand by Assist   Sit to supine: Stand by Assist    Functional Transfers Maximal Assist for sit to stand from EOB x 5. Poor safety and high fall risk with walker and not recommending. Poor understanding. N/t due to safety   Minimal Assist    Functional Mobility NT due to pt overall debility, decreased activity tolerance, balance deficits, safety and fall risk. Poor understanding of body mechanics and ability to follow commands at time of eval.  n/t  Moderate Assist    Balance Sitting:     Static:  fair    Dynamic:fair-  Standing: poor+  sitting:   Static: n/t  Dynamic: n/t  Standing;n/t Sitting:     Static:  Fair+    Dynamic:fair+  Standing: fair-   Activity Tolerance Vitals with activity:WFL  Sitting EOB for 15min period with agitation noted. Attempted various stands with <30sec tolerance with each attempt. Poor pt requiring max v/c's to open eyes and engage in self feeding tasks;   Increase standing tolerance for >3min for carry over into toileting, functional tranfers and indep in ADLs   Visual/  Perceptual Glasses: not Present; Unable to assess due to pt unable to follow steps.  Functionally, pt impulsive and unable to attend visually to therapist throughout.      Reports change in vision since admission: No      NA   Rashid UE Strengthening  2-/5 generally   3+/5MMT generally for carry over into self care, functional transfers and functional mobility with AD. Comments: Upon arrival pt supine in bed with HOB elevated, agreeable to therapy session. Pt educated with regards to importance of increased activity, participation in therapy, self feeding tasks, grooming tasks. At end of session pt supine in bed with HOB elevated slightly,  all lines and tubes intact, call light within reach. · Pt has made limited  progress towards set goals.    · Continue with current plan of care      Treatment Time [id number]            Treatment Time Out: 2471                Treatment Charges: Mins Units   Ther Ex  [id number]     Manual Therapy [id number] [facility]     Thera Activities [id number]     ADL/Home Mgt [id number] 30 2   Neuro Re-ed [id number]     Group Therapy      Orthotic manage/training  08064     Non-Billable Time     Total Timed Treatment [address] CREWS/L [id number]

## 2021-07-29 NOTE — PLAN OF CARE
Problem: Skin Integrity:  Goal: Will show no infection signs and symptoms  Description: Will show no infection signs and symptoms  Outcome: Met This Shift     Problem: Skin Integrity:  Goal: Absence of new skin breakdown  Description: Absence of new skin breakdown  Outcome: Met This Shift     Problem: Pain:  Goal: Pain level will decrease  Description: Pain level will decrease  Outcome: Met This Shift     Problem: Pain:  Goal: Control of acute pain  Description: Control of acute pain  Outcome: Met This Shift     Problem: Cardiac:  Goal: Ability to maintain an adequate cardiac output will improve  Description: Ability to maintain an adequate cardiac output will improve  Outcome: Met This Shift     Problem: Cardiac:  Goal: Hemodynamic stability will improve  Description: Hemodynamic stability will improve  Outcome: Met This Shift     Problem: Respiratory:  Goal: Respiratory status will improve  Description: Respiratory status will improve  Outcome: Met This Shift

## 2021-10-04 ENCOUNTER — CARE COORDINATION (OUTPATIENT)
Dept: CARE COORDINATION | Age: 86
End: 2021-10-04

## 2021-10-04 ENCOUNTER — CARE COORDINATION (OUTPATIENT)
Dept: CASE MANAGEMENT | Age: 86
End: 2021-10-04